# Patient Record
Sex: FEMALE | Race: WHITE | NOT HISPANIC OR LATINO | Employment: UNEMPLOYED | URBAN - NONMETROPOLITAN AREA
[De-identification: names, ages, dates, MRNs, and addresses within clinical notes are randomized per-mention and may not be internally consistent; named-entity substitution may affect disease eponyms.]

---

## 2017-09-20 ENCOUNTER — OFFICE VISIT (OUTPATIENT)
Dept: SURGERY | Facility: CLINIC | Age: 47
End: 2017-09-20

## 2017-09-20 VITALS
DIASTOLIC BLOOD PRESSURE: 82 MMHG | WEIGHT: 209.6 LBS | TEMPERATURE: 98 F | HEIGHT: 64 IN | HEART RATE: 62 BPM | BODY MASS INDEX: 35.78 KG/M2 | OXYGEN SATURATION: 98 % | SYSTOLIC BLOOD PRESSURE: 116 MMHG

## 2017-09-20 DIAGNOSIS — K21.9 GASTROESOPHAGEAL REFLUX DISEASE, ESOPHAGITIS PRESENCE NOT SPECIFIED: ICD-10-CM

## 2017-09-20 DIAGNOSIS — Z85.038 ENCOUNTER FOR COLONOSCOPY DUE TO HISTORY OF COLON CANCER: Primary | ICD-10-CM

## 2017-09-20 DIAGNOSIS — Z12.11 ENCOUNTER FOR COLONOSCOPY DUE TO HISTORY OF COLON CANCER: Primary | ICD-10-CM

## 2017-09-20 PROCEDURE — 99213 OFFICE O/P EST LOW 20 MIN: CPT | Performed by: SURGERY

## 2017-09-20 RX ORDER — SODIUM CHLORIDE 9 MG/ML
50 INJECTION, SOLUTION INTRAVENOUS CONTINUOUS
Status: CANCELLED | OUTPATIENT
Start: 2017-09-20

## 2017-09-20 RX ORDER — PRAVASTATIN SODIUM 40 MG
40 TABLET ORAL DAILY
COMMUNITY
Start: 2017-09-11

## 2017-09-20 RX ORDER — TIZANIDINE 2 MG/1
2 TABLET ORAL EVERY 6 HOURS PRN
COMMUNITY
Start: 2017-09-19

## 2017-09-20 RX ORDER — PANTOPRAZOLE SODIUM 40 MG/1
TABLET, DELAYED RELEASE ORAL
COMMUNITY
Start: 2017-09-19 | End: 2017-12-20 | Stop reason: SDUPTHER

## 2017-09-20 RX ORDER — BISACODYL 5 MG/1
TABLET, DELAYED RELEASE ORAL
Qty: 4 TABLET | Refills: 0 | Status: SHIPPED | OUTPATIENT
Start: 2017-09-20 | End: 2017-10-02 | Stop reason: HOSPADM

## 2017-09-20 RX ORDER — CETIRIZINE HYDROCHLORIDE 10 MG/1
10 TABLET ORAL DAILY
COMMUNITY
Start: 2017-09-19

## 2017-09-20 RX ORDER — PRENATAL WITH FERROUS FUM AND FOLIC ACID 3080; 920; 120; 400; 22; 1.84; 3; 20; 10; 1; 12; 200; 27; 25; 2 [IU]/1; [IU]/1; MG/1; [IU]/1; MG/1; MG/1; MG/1; MG/1; MG/1; MG/1; UG/1; MG/1; MG/1; MG/1; MG/1
1 TABLET ORAL DAILY
COMMUNITY
Start: 2017-09-19

## 2017-09-20 RX ORDER — LEVOTHYROXINE SODIUM 0.07 MG/1
75 TABLET ORAL DAILY
COMMUNITY
Start: 2017-09-19

## 2017-09-20 RX ORDER — SODIUM CHLORIDE 0.9 % (FLUSH) 0.9 %
1-10 SYRINGE (ML) INJECTION AS NEEDED
Status: CANCELLED | OUTPATIENT
Start: 2017-09-20

## 2017-09-20 RX ORDER — ACETAMINOPHEN 650 MG/1
650 TABLET, FILM COATED, EXTENDED RELEASE ORAL EVERY 8 HOURS PRN
COMMUNITY
Start: 2017-09-19

## 2017-09-20 RX ORDER — POLYETHYLENE GLYCOL 3350 17 G/17G
238 POWDER, FOR SOLUTION ORAL ONCE
Qty: 238 G | Refills: 0 | Status: SHIPPED | OUTPATIENT
Start: 2017-09-20 | End: 2017-09-20

## 2017-09-20 RX ORDER — GABAPENTIN 800 MG/1
TABLET ORAL
COMMUNITY
Start: 2017-09-19 | End: 2018-12-07 | Stop reason: HOSPADM

## 2017-09-20 RX ORDER — FLUOXETINE HYDROCHLORIDE 20 MG/1
20 CAPSULE ORAL DAILY
COMMUNITY
Start: 2017-09-19

## 2017-09-20 RX ORDER — LISINOPRIL 20 MG/1
40 TABLET ORAL DAILY
COMMUNITY
Start: 2017-09-13

## 2017-09-20 RX ORDER — CARVEDILOL 6.25 MG/1
6.25 TABLET ORAL 2 TIMES DAILY WITH MEALS
COMMUNITY
Start: 2017-09-13

## 2017-10-02 ENCOUNTER — ANESTHESIA EVENT (OUTPATIENT)
Dept: GASTROENTEROLOGY | Facility: HOSPITAL | Age: 47
End: 2017-10-02

## 2017-10-02 ENCOUNTER — HOSPITAL ENCOUNTER (OUTPATIENT)
Facility: HOSPITAL | Age: 47
Setting detail: HOSPITAL OUTPATIENT SURGERY
Discharge: HOME | End: 2017-10-02
Attending: SURGERY | Admitting: SURGERY

## 2017-10-02 ENCOUNTER — ANESTHESIA (OUTPATIENT)
Dept: GASTROENTEROLOGY | Facility: HOSPITAL | Age: 47
End: 2017-10-02

## 2017-10-02 VITALS
HEART RATE: 70 BPM | HEIGHT: 64 IN | BODY MASS INDEX: 35 KG/M2 | RESPIRATION RATE: 18 BRPM | DIASTOLIC BLOOD PRESSURE: 88 MMHG | WEIGHT: 205 LBS | TEMPERATURE: 97.6 F | SYSTOLIC BLOOD PRESSURE: 155 MMHG | OXYGEN SATURATION: 98 %

## 2017-10-02 DIAGNOSIS — Z12.11 ENCOUNTER FOR COLONOSCOPY DUE TO HISTORY OF COLON CANCER: ICD-10-CM

## 2017-10-02 DIAGNOSIS — Z85.038 ENCOUNTER FOR COLONOSCOPY DUE TO HISTORY OF COLON CANCER: ICD-10-CM

## 2017-10-02 DIAGNOSIS — K21.9 GASTROESOPHAGEAL REFLUX DISEASE, ESOPHAGITIS PRESENCE NOT SPECIFIED: ICD-10-CM

## 2017-10-02 LAB — GLUCOSE BLDC GLUCOMTR-MCNC: 100 MG/DL (ref 70–130)

## 2017-10-02 PROCEDURE — 43239 EGD BIOPSY SINGLE/MULTIPLE: CPT | Performed by: SURGERY

## 2017-10-02 PROCEDURE — 25010000002 MIDAZOLAM PER 1 MG: Performed by: NURSE ANESTHETIST, CERTIFIED REGISTERED

## 2017-10-02 PROCEDURE — 25010000002 PROPOFOL 200 MG/20ML EMULSION: Performed by: NURSE ANESTHETIST, CERTIFIED REGISTERED

## 2017-10-02 PROCEDURE — G0105 COLORECTAL SCRN; HI RISK IND: HCPCS | Performed by: SURGERY

## 2017-10-02 PROCEDURE — 82962 GLUCOSE BLOOD TEST: CPT

## 2017-10-02 RX ORDER — ALBUTEROL SULFATE 90 UG/1
2 AEROSOL, METERED RESPIRATORY (INHALATION) EVERY 4 HOURS PRN
COMMUNITY
End: 2018-12-07 | Stop reason: HOSPADM

## 2017-10-02 RX ORDER — SODIUM CHLORIDE 9 MG/ML
50 INJECTION, SOLUTION INTRAVENOUS CONTINUOUS
Status: DISCONTINUED | OUTPATIENT
Start: 2017-10-02 | End: 2017-10-02 | Stop reason: HOSPADM

## 2017-10-02 RX ORDER — SODIUM CHLORIDE 0.9 % (FLUSH) 0.9 %
1-10 SYRINGE (ML) INJECTION AS NEEDED
Status: DISCONTINUED | OUTPATIENT
Start: 2017-10-02 | End: 2017-10-02 | Stop reason: HOSPADM

## 2017-10-02 RX ORDER — MIDAZOLAM HYDROCHLORIDE 1 MG/ML
INJECTION INTRAMUSCULAR; INTRAVENOUS AS NEEDED
Status: DISCONTINUED | OUTPATIENT
Start: 2017-10-02 | End: 2017-10-02 | Stop reason: SURG

## 2017-10-02 RX ORDER — PROPOFOL 10 MG/ML
INJECTION, EMULSION INTRAVENOUS AS NEEDED
Status: DISCONTINUED | OUTPATIENT
Start: 2017-10-02 | End: 2017-10-02 | Stop reason: SURG

## 2017-10-02 RX ADMIN — SODIUM CHLORIDE 50 ML/HR: 9 INJECTION, SOLUTION INTRAVENOUS at 10:35

## 2017-10-02 RX ADMIN — MIDAZOLAM HYDROCHLORIDE 2 MG: 1 INJECTION, SOLUTION INTRAMUSCULAR; INTRAVENOUS at 11:15

## 2017-10-02 RX ADMIN — PROPOFOL 50 MG: 10 INJECTION, EMULSION INTRAVENOUS at 11:29

## 2017-10-02 RX ADMIN — LIDOCAINE HYDROCHLORIDE 60 MG: 20 INJECTION, SOLUTION INTRAVENOUS at 11:18

## 2017-10-02 RX ADMIN — PROPOFOL 100 MG: 10 INJECTION, EMULSION INTRAVENOUS at 11:18

## 2017-10-02 RX ADMIN — Medication 20 MG: at 11:18

## 2017-10-02 RX ADMIN — PROPOFOL 50 MG: 10 INJECTION, EMULSION INTRAVENOUS at 11:24

## 2017-10-02 NOTE — PLAN OF CARE
Problem: GI Endoscopy (Adult)  Goal: Signs and Symptoms of Listed Potential Problems Will be Absent or Manageable (GI Endoscopy)  Outcome: Outcome(s) achieved Date Met:  10/02/17    10/02/17 1204   GI Endoscopy   Problems Assessed (GI Endoscopy) all   Problems Present (GI Endoscopy) none

## 2017-10-02 NOTE — ANESTHESIA PREPROCEDURE EVALUATION
Anesthesia Evaluation     Patient summary reviewed and Nursing notes reviewed   no history of anesthetic complications:  NPO Solid Status: > 8 hours  NPO Liquid Status: > 8 hours     Airway   Mallampati: II  Neck ROM: full  no difficulty expected  Dental - normal exam     Pulmonary - normal exam    breath sounds clear to auscultation  (+) COPD mild, sleep apnea,   Cardiovascular - normal exam  Exercise tolerance: good (4-7 METS)    ECG reviewed  Patient on routine beta blocker and Beta blocker given within 24 hours of surgery  Rhythm: regular  Rate: normal    (+) hypertension well controlled, CHF,       Neuro/Psych  (+) headaches, psychiatric history Depression,    GI/Hepatic/Renal/Endo    (+) obesity,  GERD poorly controlled, diabetes mellitus type 2 well controlled, hypothyroidism,     Musculoskeletal     Abdominal   (+) obese,    Substance History - negative use     OB/GYN negative ob/gyn ROS         Other   (+) arthritis   history of cancer remission    ROS/Med Hx Other: Hx colon CA                                  Anesthesia Plan    ASA 3     MAC     Anesthetic plan and risks discussed with patient.

## 2017-10-02 NOTE — H&P (VIEW-ONLY)
Subjective   Rehana Hamilton is a 47 y.o. female.   Chief Complaint   Patient presents with   • Follow-up     Follow up Colon            History of Present Illness   Ms. Hamilton returns to the office to discuss surveillance colonoscopy.  She has a personal history of colon cancer diagnosed in 2014 which was subsequently treated with transverse colectomy.  Her las colonoscopy was in 7/2016 and was performed during an evaluation for GI bleeding.  She is due for routine high risk surveillance.       Patient Active Problem List   Diagnosis   • Allergic rhinitis   • GERD (gastroesophageal reflux disease)   • Hypertension   • Migraine   • Sleep apnea   • Rheumatoid arthritis   • Type 2 diabetes mellitus   • Congestive heart failure   • Chronic obstructive lung disease   • Colon cancer   • Hypothyroidism   • Encounter for colonoscopy due to history of colon cancer   • Gastroesophageal reflux disease       Past Medical History:   Diagnosis Date   • Allergic rhinitis    • Cancer of transverse colon     Personal history of colon cancer   • Colon cancer 10/11/2016   • Congestive heart failure    • Depression    • Esophagitis    • GERD (gastroesophageal reflux disease)    • History of chronic obstructive lung disease    • History of degenerative disc disease    • History of migraine    • History of sleep apnea    • Hypertension    • Personal history of congestive heart failure    • Rheumatoid arthritis    • Type 2 diabetes mellitus        Past Surgical History:   Procedure Laterality Date   • CHOLECYSTECTOMY     • COLON RESECTION  05/01/2014   • COLONOSCOPY W/ BIOPSIES     • COLONOSCOPY W/ POLYPECTOMY     • DILATION AND CURETTAGE, DIAGNOSTIC / THERAPEUTIC     • HYSTERECTOMY      Total Abdominal Hysterectomy with Removal of Both Ovaries   • TUBAL ABDOMINAL LIGATION     • UPPER GASTROINTESTINAL ENDOSCOPY      Diagnostic       Meds: Reviewed and updated as appropriate.        Allergies   Allergen Reactions   • Ciprofloxacin  "Hives         Family History   Problem Relation Age of Onset   • Prostate cancer Father    • Osteoporosis Mother    • Diabetes Sister          Social History     Social History   • Marital status:      Spouse name: N/A   • Number of children: N/A   • Years of education: N/A     Occupational History   • Not on file.     Social History Main Topics   • Smoking status: Never Smoker   • Smokeless tobacco: Never Used   • Alcohol use No   • Drug use: No   • Sexual activity: Defer     Other Topics Concern   • Not on file     Social History Narrative         Review of Systems   Constitutional: Negative for chills, fever and unexpected weight change.   HENT: Negative for hearing loss, trouble swallowing and voice change.    Eyes: Negative for visual disturbance.   Respiratory: Negative for apnea, cough, chest tightness, shortness of breath and wheezing.    Cardiovascular: Negative for chest pain, palpitations and leg swelling.   Gastrointestinal: Positive for diarrhea and nausea. Negative for abdominal distention, abdominal pain, anal bleeding, blood in stool, constipation, rectal pain and vomiting.   Endocrine: Negative for cold intolerance and heat intolerance.   Genitourinary: Negative for difficulty urinating, dysuria and flank pain.   Musculoskeletal: Negative for back pain and gait problem.   Skin: Negative for color change, rash and wound.   Neurological: Negative for dizziness, syncope, speech difficulty, weakness, light-headedness, numbness and headaches.   Hematological: Negative for adenopathy. Does not bruise/bleed easily.   Psychiatric/Behavioral: Negative for confusion. The patient is not nervous/anxious.        Objective    /82  Pulse 62  Temp 98 °F (36.7 °C) (Temporal Artery )   Ht 64\" (162.6 cm)  Wt 209 lb 9.6 oz (95.1 kg)  SpO2 98%  BMI 35.98 kg/m2    Physical Exam   Constitutional: She is oriented to person, place, and time. She appears well-developed and well-nourished.   HENT:   Head: " Normocephalic and atraumatic.   Eyes: No scleral icterus.   Cardiovascular: Regular rhythm.    Abdominal: Soft. She exhibits no distension. There is no tenderness.   Neurological: She is alert and oriented to person, place, and time.   Skin: Skin is warm and dry.   Psychiatric: She has a normal mood and affect. Her behavior is normal.       Assessment/Plan   Rehana was seen today for follow-up.    Diagnoses and all orders for this visit:    Encounter for colonoscopy due to history of colon cancer  -     Case Request; Standing  -     sodium chloride 0.9 % infusion; Infuse 50 mL/hr into a venous catheter Continuous.  -     sodium chloride 0.9 % flush 1-10 mL; Infuse 1-10 mL into a venous catheter As Needed for Line Care.  -     Case Request    Gastroesophageal reflux disease, esophagitis presence not specified  -     Case Request; Standing  -     sodium chloride 0.9 % infusion; Infuse 50 mL/hr into a venous catheter Continuous.  -     sodium chloride 0.9 % flush 1-10 mL; Infuse 1-10 mL into a venous catheter As Needed for Line Care.  -     Case Request    Other orders  -     Follow Anesthesia Guidelines / Standing Orders; Future  -     Provide NPO Instructions to Patient  -     Follow Anesthesia Guidelines / Standing Orders; Standing  -     Verify NPO Status; Standing  -     Notify Physician (Specify Parameters); Standing  -     Insert Peripheral IV; Standing  -     Saline Lock & Maintain IV Access; Standing  -     polyethylene glycol (MIRALAX) powder; Take 238 g by mouth 1 (One) Time for 1 dose. Mix 238 grams of miralax with 64 oz garotade and drink at 4 pm day before colonoscopy  -     bisacodyl (DULCOLAX) 5 MG EC tablet; Take 4 tablets once by mouth at 1 pm on day before colonoscopy.        We discussed colonoscopy for colon cancer surveillance purposes.  We discussed the indications for colonoscopy as well as the risks, benefits and alternatives to this procedure. Risks including but not limited to perforation,  bleeding,need for blood transfusion or emergent surgery ,and missed neoplasm were reviewed in detail with the patient. The necessary bowel preparation and pre-procedure clear liquid diet was explained in detail.  A written instructional handout was also provided.  Electronic prescriptions for miralax and dulcolax were sent to the patient's pharmacy.  The patient was given an opportunity to ask questions.  The patient verbalized understanding of these recommendations and the plan of care. The patient is willing to proceed with colonoscopy and has signed informed consent in the office today.  My office will arrange scheduling for the colonoscopy procedure and pre-admission testing.

## 2017-10-02 NOTE — ANESTHESIA POSTPROCEDURE EVALUATION
Patient: Rehana Hamilton    Procedure Summary     Date Anesthesia Start Anesthesia Stop Room / Location    10/02/17 1112 1152 [facility] ENDOSCOPY 3 / [facility]       Procedure Diagnosis Surgeon Provider    DIAGNOSTIC COLONOSCOPY (N/A ); ESOPHAGOGASTRODUODENOSCOPY WITH BIOPSY (N/A Esophagus) Encounter for colonoscopy due to history of colon cancer; Gastroesophageal reflux disease, esophagitis presence not specified  (Encounter for colonoscopy due to history of colon cancer [Z12.11, Z85.038]; Gastroesophageal reflux disease, esophagitis presence not specified [K21.9]) MD Mallory Wiley CRNA          Anesthesia Type: MAC  Last vitals  BP   155/88 (10/02/17 1224)    Temp   97.6 °F (36.4 °C) (10/02/17 1154)    Pulse   70 (10/02/17 1224)   Resp   18 (10/02/17 1224)    SpO2   98 % (10/02/17 1224)      Post Anesthesia Care and Evaluation    Patient location during evaluation: PHASE II  Patient participation: complete - patient participated  Level of consciousness: awake and alert  Pain score: 0  Pain management: satisfactory to patient  Airway patency: patent  Anesthetic complications: No anesthetic complications  PONV Status: none  Cardiovascular status: acceptable and stable  Respiratory status: acceptable  Hydration status: acceptable

## 2017-10-02 NOTE — PLAN OF CARE
Problem: GI Endoscopy (Adult)  Goal: Signs and Symptoms of Listed Potential Problems Will be Absent or Manageable (GI Endoscopy)  Outcome: Ongoing (interventions implemented as appropriate)    10/02/17 0959   GI Endoscopy   Problems Assessed (GI Endoscopy) all   Problems Present (GI Endoscopy) none

## 2017-10-02 NOTE — BRIEF OP NOTE
COLONOSCOPY, ESOPHAGOGASTRODUODENOSCOPY WITH BIOPSY  Progress Note    Rehana Hamilton  10/2/2017    Pre-op Diagnosis:   Encounter for colonoscopy due to history of colon cancer [Z12.11, Z85.038]  Gastroesophageal reflux disease, esophagitis presence not specified [K21.9]    Post-op Diagnosis:     Gastritis, distal esophagitis with noted salmon colored mucosa, small hiatal hernia.   Normal colon, internal hemorrhoids.     Procedure/CPT® Codes: 96748, 34055      Procedure(s):  DIAGNOSTIC COLONOSCOPY  ESOPHAGOGASTRODUODENOSCOPY WITH BIOPSY    Surgeon(s):  Elina Summers MD    Anesthesia: Monitor Anesthesia Care    Staff:   Circulator: Nupur Smith RN; Jenelle Sanders  Scrub Person: Kinza Gore    Estimated Blood Loss: 0 mL    Urine Voided: * No values recorded between 10/2/2017 11:06 AM and 10/2/2017 11:49 AM *    Specimens:                  ID Type Source Tests Collected by Time Destination   A : biopsy Tissue Gastric, Antrum TISSUE EXAM Nupur Smith RN 10/2/2017 1120    B : biopsy duodenum Tissue Small Intestine, Duodenum TISSUE EXAM Elina Summers MD 10/2/2017 1123    C : Esophagus biopsy Tissue Esophagus TISSUE EXAM Nupur Smith RN 10/2/2017 1124              Findings: see above    Complications: none      Elina Summers MD     Date: 10/2/2017  Time: 11:49 AM

## 2017-10-05 LAB
LAB AP CASE REPORT: NORMAL
Lab: NORMAL
PATH REPORT.FINAL DX SPEC: NORMAL

## 2017-11-01 ENCOUNTER — TELEPHONE (OUTPATIENT)
Dept: SURGERY | Facility: CLINIC | Age: 47
End: 2017-11-01

## 2017-11-01 NOTE — TELEPHONE ENCOUNTER
Called patient to reschedule her no show appointment, patient answered and then hung up. Will try again.

## 2017-11-08 ENCOUNTER — OFFICE VISIT (OUTPATIENT)
Dept: SURGERY | Facility: CLINIC | Age: 47
End: 2017-11-08

## 2017-11-08 VITALS
BODY MASS INDEX: 35.61 KG/M2 | HEIGHT: 64 IN | TEMPERATURE: 97.8 F | DIASTOLIC BLOOD PRESSURE: 80 MMHG | HEART RATE: 68 BPM | OXYGEN SATURATION: 98 % | SYSTOLIC BLOOD PRESSURE: 140 MMHG | WEIGHT: 208.6 LBS

## 2017-11-08 DIAGNOSIS — C18.4 MALIGNANT NEOPLASM OF TRANSVERSE COLON (HCC): ICD-10-CM

## 2017-11-08 DIAGNOSIS — K21.00 GASTROESOPHAGEAL REFLUX DISEASE WITH ESOPHAGITIS: Primary | ICD-10-CM

## 2017-11-08 DIAGNOSIS — Z12.11 ENCOUNTER FOR COLONOSCOPY DUE TO HISTORY OF COLON CANCER: ICD-10-CM

## 2017-11-08 DIAGNOSIS — Z85.038 ENCOUNTER FOR COLONOSCOPY DUE TO HISTORY OF COLON CANCER: ICD-10-CM

## 2017-11-08 PROCEDURE — 99212 OFFICE O/P EST SF 10 MIN: CPT | Performed by: SURGERY

## 2017-11-08 RX ORDER — RANITIDINE 150 MG/1
150 TABLET ORAL NIGHTLY
Qty: 30 TABLET | Refills: 3 | Status: SHIPPED | OUTPATIENT
Start: 2017-11-08 | End: 2018-02-06

## 2017-12-01 NOTE — PROGRESS NOTES
Patient: Rehana Hamilton    YOB: 1970    Date: 11/08/2017    Primary Care Provider: Dana Dubois MD    Reason for Consultation: Follow-up EGD    Chief Complaint:   Chief Complaint   Patient presents with   • Follow-up     Patient is here for a follow up on Colonoscopy and EGD       History of present illness:  Ms. Hamilton presents for follow up her recent colonoscopy and EGD. Pathology shows minimal chronic gastritis, benign duodenal mucosa with no specific pathologic diagnosis.  Her colonoscopy was normal.  Patient states that she has some abdominal soreness at times.  Patient states that she has the acid that comes and goes.  Patient states that she has some nausea at times.    The following portions of the patient's history were reviewed and updated as appropriate: allergies, current medications, past family history, past medical history, past social history, past surgical history and problem list.      Review of Systems   Constitutional: Negative for chills, fever and unexpected weight change.   HENT: Positive for trouble swallowing. Negative for hearing loss and voice change.    Eyes: Negative for visual disturbance.   Respiratory: Negative for apnea, cough, chest tightness, shortness of breath and wheezing.    Cardiovascular: Negative for chest pain, palpitations and leg swelling.   Gastrointestinal: Positive for abdominal pain and nausea. Negative for abdominal distention, anal bleeding, blood in stool, constipation, diarrhea, rectal pain and vomiting.   Endocrine: Negative for cold intolerance and heat intolerance.   Genitourinary: Negative for difficulty urinating, dysuria and flank pain.   Musculoskeletal: Negative for back pain and gait problem.   Skin: Negative for color change, rash and wound.   Neurological: Negative for dizziness, syncope, speech difficulty, weakness, light-headedness, numbness and headaches.   Hematological: Negative for adenopathy. Does not  bruise/bleed easily.   Psychiatric/Behavioral: Negative for confusion. The patient is not nervous/anxious.        Vital Signs:       Vitals:    11/08/17 1052   BP: 140/80   Pulse: 68   Temp: 97.8 °F (36.6 °C)   SpO2: 98%         Allergies:  Allergies   Allergen Reactions   • Ciprofloxacin Hives       Medications:    Current Outpatient Prescriptions:   •  albuterol (PROVENTIL HFA;VENTOLIN HFA) 108 (90 Base) MCG/ACT inhaler, Inhale 2 puffs Every 4 (Four) Hours As Needed for Wheezing., Disp: , Rfl:   •  carvedilol (COREG) 6.25 MG tablet, , Disp: , Rfl:   •  cetirizine (zyrTEC) 10 MG tablet, , Disp: , Rfl:   •  FLUoxetine (PROzac) 20 MG capsule, , Disp: , Rfl:   •  gabapentin (NEURONTIN) 800 MG tablet, , Disp: , Rfl:   •  levothyroxine (SYNTHROID, LEVOTHROID) 75 MCG tablet, , Disp: , Rfl:   •  lisinopril (PRINIVIL,ZESTRIL) 20 MG tablet, , Disp: , Rfl:   •  metFORMIN (GLUCOPHAGE) 500 MG tablet, , Disp: , Rfl:   •  pantoprazole (PROTONIX) 40 MG EC tablet, , Disp: , Rfl:   •  pravastatin (PRAVACHOL) 40 MG tablet, , Disp: , Rfl:   •  Prenatal Vit-Fe Fumarate-FA (PRENATAL 27-1) 27-1 MG tablet tablet, , Disp: , Rfl:   •  SM 8 HOUR PAIN RELIEF 650 MG 8 hr tablet, , Disp: , Rfl:   •  tiZANidine (ZANAFLEX) 2 MG tablet, , Disp: , Rfl:   •  raNITIdine (ZANTAC) 150 MG tablet, Take 1 tablet by mouth Every Night for 90 days., Disp: 30 tablet, Rfl: 3    Physical Exam:   General Appearance:    Alert, cooperative, in no acute distress   Abdomen:     no masses, no organomegaly, soft non-tender, non-distended, no guarding, wounds are well healed, no evidence of recurrent hernia   Chest:      Clear toausculation            Cor:  Regular rate and rhythm      Assessment / Plan:    1. Gastroesophageal reflux disease with esophagitis    2. Malignant neoplasm of transverse colon    3. Encounter for colonoscopy due to history of colon cancer        I did discuss theResults as above with the patient today in the office and they have done well  from their recent EGD with biopsy and surveillance colonoscopy.  I have added Zantac to the patient's regimen for improved control of her gastroesophageal reflux.  Regarding her history of colon cancer and her colon screening, she will need a repeat exam in one year.  I have told the patient I will follow up in one month or reevaluation of her gastroesophageal reflux disease..      Scribed for Elina Summers MD by Brittany Bryant.

## 2017-12-14 ENCOUNTER — TELEPHONE (OUTPATIENT)
Dept: SURGERY | Facility: CLINIC | Age: 47
End: 2017-12-14

## 2017-12-14 NOTE — TELEPHONE ENCOUNTER
Called and spoke with patient about no showing for her appointment with Dr Summers. Rescheduled her appointment for 12/20/17.

## 2017-12-20 ENCOUNTER — OFFICE VISIT (OUTPATIENT)
Dept: SURGERY | Facility: CLINIC | Age: 47
End: 2017-12-20

## 2017-12-20 VITALS
HEIGHT: 64 IN | SYSTOLIC BLOOD PRESSURE: 130 MMHG | TEMPERATURE: 97.7 F | OXYGEN SATURATION: 98 % | WEIGHT: 212 LBS | BODY MASS INDEX: 36.19 KG/M2 | HEART RATE: 59 BPM | DIASTOLIC BLOOD PRESSURE: 80 MMHG

## 2017-12-20 DIAGNOSIS — K21.00 GASTROESOPHAGEAL REFLUX DISEASE WITH ESOPHAGITIS: Primary | ICD-10-CM

## 2017-12-20 PROCEDURE — 99213 OFFICE O/P EST LOW 20 MIN: CPT | Performed by: SURGERY

## 2017-12-20 RX ORDER — PANTOPRAZOLE SODIUM 40 MG/1
40 TABLET, DELAYED RELEASE ORAL DAILY
Qty: 30 TABLET | Refills: 10 | Status: SHIPPED | OUTPATIENT
Start: 2017-12-20 | End: 2019-01-10 | Stop reason: SDUPTHER

## 2017-12-20 RX ORDER — SIMETHICONE 80 MG
80 TABLET,CHEWABLE ORAL EVERY 6 HOURS PRN
Qty: 100 TABLET | Refills: 0 | Status: SHIPPED | OUTPATIENT
Start: 2017-12-20 | End: 2018-01-19

## 2018-01-19 ENCOUNTER — HOSPITAL ENCOUNTER (OUTPATIENT)
Dept: NUTRITION | Facility: HOSPITAL | Age: 48
Discharge: HOME | End: 2018-01-19

## 2018-05-24 ENCOUNTER — APPOINTMENT (OUTPATIENT)
Dept: NUTRITION | Facility: HOSPITAL | Age: 48
End: 2018-05-24

## 2018-06-14 ENCOUNTER — APPOINTMENT (OUTPATIENT)
Dept: DIABETES SERVICES | Facility: HOSPITAL | Age: 48
End: 2018-06-14

## 2018-10-05 ENCOUNTER — TELEPHONE (OUTPATIENT)
Dept: SURGERY | Facility: CLINIC | Age: 48
End: 2018-10-05

## 2018-10-31 ENCOUNTER — TELEPHONE (OUTPATIENT)
Dept: SURGERY | Facility: CLINIC | Age: 48
End: 2018-10-31

## 2018-10-31 RX ORDER — POLYETHYLENE GLYCOL 3350 17 G/17G
238 POWDER, FOR SOLUTION ORAL ONCE
Qty: 14 PACKET | Refills: 0 | Status: SHIPPED | OUTPATIENT
Start: 2018-10-31 | End: 2018-10-31

## 2018-10-31 RX ORDER — BISACODYL 5 MG/1
5 TABLET, DELAYED RELEASE ORAL DAILY PRN
Qty: 4 TABLET | Refills: 0 | Status: SHIPPED | OUTPATIENT
Start: 2018-10-31 | End: 2018-12-07 | Stop reason: HOSPADM

## 2018-10-31 NOTE — TELEPHONE ENCOUNTER
Per Dr. Summers I called the patient and scheduled her open access. She is scheduled for a colonoscopy on 12/07/18.

## 2018-11-14 ENCOUNTER — PREP FOR SURGERY (OUTPATIENT)
Dept: OTHER | Facility: HOSPITAL | Age: 48
End: 2018-11-14

## 2018-11-14 DIAGNOSIS — Z85.038 PERSONAL HISTORY OF COLON CANCER: Primary | ICD-10-CM

## 2018-11-14 RX ORDER — SODIUM CHLORIDE 0.9 % (FLUSH) 0.9 %
3-10 SYRINGE (ML) INJECTION AS NEEDED
Status: CANCELLED | OUTPATIENT
Start: 2018-11-14

## 2018-11-14 RX ORDER — SODIUM CHLORIDE, SODIUM LACTATE, POTASSIUM CHLORIDE, CALCIUM CHLORIDE 600; 310; 30; 20 MG/100ML; MG/100ML; MG/100ML; MG/100ML
50 INJECTION, SOLUTION INTRAVENOUS CONTINUOUS
Status: CANCELLED | OUTPATIENT
Start: 2018-11-14

## 2018-11-14 RX ORDER — SODIUM CHLORIDE 0.9 % (FLUSH) 0.9 %
3 SYRINGE (ML) INJECTION EVERY 12 HOURS SCHEDULED
Status: CANCELLED | OUTPATIENT
Start: 2018-11-14

## 2018-12-07 ENCOUNTER — ANESTHESIA EVENT (OUTPATIENT)
Dept: GASTROENTEROLOGY | Facility: HOSPITAL | Age: 48
End: 2018-12-07

## 2018-12-07 ENCOUNTER — HOSPITAL ENCOUNTER (OUTPATIENT)
Facility: HOSPITAL | Age: 48
Setting detail: HOSPITAL OUTPATIENT SURGERY
Discharge: HOME | End: 2018-12-07
Attending: SURGERY | Admitting: SURGERY

## 2018-12-07 ENCOUNTER — ANESTHESIA (OUTPATIENT)
Dept: GASTROENTEROLOGY | Facility: HOSPITAL | Age: 48
End: 2018-12-07

## 2018-12-07 VITALS
SYSTOLIC BLOOD PRESSURE: 152 MMHG | RESPIRATION RATE: 20 BRPM | OXYGEN SATURATION: 99 % | TEMPERATURE: 98.1 F | BODY MASS INDEX: 39.09 KG/M2 | HEART RATE: 62 BPM | DIASTOLIC BLOOD PRESSURE: 77 MMHG | WEIGHT: 229 LBS | HEIGHT: 64 IN

## 2018-12-07 DIAGNOSIS — Z85.038 PERSONAL HISTORY OF COLON CANCER: ICD-10-CM

## 2018-12-07 LAB — GLUCOSE BLDC GLUCOMTR-MCNC: 102 MG/DL (ref 70–130)

## 2018-12-07 PROCEDURE — S0260 H&P FOR SURGERY: HCPCS | Performed by: SURGERY

## 2018-12-07 PROCEDURE — 25010000002 PROPOFOL 200 MG/20ML EMULSION: Performed by: NURSE ANESTHETIST, CERTIFIED REGISTERED

## 2018-12-07 PROCEDURE — 82962 GLUCOSE BLOOD TEST: CPT

## 2018-12-07 PROCEDURE — 25010000002 MIDAZOLAM PER 1 MG: Performed by: NURSE ANESTHETIST, CERTIFIED REGISTERED

## 2018-12-07 PROCEDURE — G0105 COLORECTAL SCRN; HI RISK IND: HCPCS | Performed by: SURGERY

## 2018-12-07 RX ORDER — SODIUM CHLORIDE, SODIUM LACTATE, POTASSIUM CHLORIDE, CALCIUM CHLORIDE 600; 310; 30; 20 MG/100ML; MG/100ML; MG/100ML; MG/100ML
50 INJECTION, SOLUTION INTRAVENOUS CONTINUOUS
Status: DISCONTINUED | OUTPATIENT
Start: 2018-12-07 | End: 2018-12-07 | Stop reason: HOSPADM

## 2018-12-07 RX ORDER — LABETALOL HYDROCHLORIDE 5 MG/ML
INJECTION, SOLUTION INTRAVENOUS AS NEEDED
Status: DISCONTINUED | OUTPATIENT
Start: 2018-12-07 | End: 2018-12-07 | Stop reason: SURG

## 2018-12-07 RX ORDER — MAGNESIUM HYDROXIDE 1200 MG/15ML
LIQUID ORAL AS NEEDED
Status: DISCONTINUED | OUTPATIENT
Start: 2018-12-07 | End: 2018-12-07 | Stop reason: HOSPADM

## 2018-12-07 RX ORDER — MEPERIDINE HYDROCHLORIDE 50 MG/ML
INJECTION INTRAMUSCULAR; INTRAVENOUS; SUBCUTANEOUS AS NEEDED
Status: DISCONTINUED | OUTPATIENT
Start: 2018-12-07 | End: 2018-12-07 | Stop reason: SURG

## 2018-12-07 RX ORDER — ASPIRIN 81 MG/1
81 TABLET, CHEWABLE ORAL DAILY
COMMUNITY
End: 2020-09-11 | Stop reason: HOSPADM

## 2018-12-07 RX ORDER — KETAMINE HYDROCHLORIDE 50 MG/ML
INJECTION, SOLUTION, CONCENTRATE INTRAMUSCULAR; INTRAVENOUS AS NEEDED
Status: DISCONTINUED | OUTPATIENT
Start: 2018-12-07 | End: 2018-12-07 | Stop reason: SURG

## 2018-12-07 RX ORDER — PROPOFOL 10 MG/ML
INJECTION, EMULSION INTRAVENOUS AS NEEDED
Status: DISCONTINUED | OUTPATIENT
Start: 2018-12-07 | End: 2018-12-07 | Stop reason: SURG

## 2018-12-07 RX ORDER — MIDAZOLAM HYDROCHLORIDE 1 MG/ML
INJECTION INTRAMUSCULAR; INTRAVENOUS AS NEEDED
Status: DISCONTINUED | OUTPATIENT
Start: 2018-12-07 | End: 2018-12-07 | Stop reason: SURG

## 2018-12-07 RX ADMIN — PROPOFOL 20 MG: 10 INJECTION, EMULSION INTRAVENOUS at 08:20

## 2018-12-07 RX ADMIN — PROPOFOL 20 MG: 10 INJECTION, EMULSION INTRAVENOUS at 08:16

## 2018-12-07 RX ADMIN — LABETALOL HYDROCHLORIDE 5 MG: 5 INJECTION, SOLUTION INTRAVENOUS at 08:08

## 2018-12-07 RX ADMIN — MIDAZOLAM HYDROCHLORIDE 2 MG: 1 INJECTION, SOLUTION INTRAMUSCULAR; INTRAVENOUS at 08:05

## 2018-12-07 RX ADMIN — MEPERIDINE HYDROCHLORIDE 50 MG: 50 INJECTION, SOLUTION INTRAMUSCULAR; INTRAVENOUS; SUBCUTANEOUS at 08:08

## 2018-12-07 RX ADMIN — PROPOFOL 20 MG: 10 INJECTION, EMULSION INTRAVENOUS at 08:14

## 2018-12-07 RX ADMIN — SODIUM CHLORIDE, POTASSIUM CHLORIDE, SODIUM LACTATE AND CALCIUM CHLORIDE 50 ML/HR: 600; 310; 30; 20 INJECTION, SOLUTION INTRAVENOUS at 07:07

## 2018-12-07 RX ADMIN — LABETALOL HYDROCHLORIDE 5 MG: 5 INJECTION, SOLUTION INTRAVENOUS at 08:05

## 2018-12-07 RX ADMIN — KETAMINE HYDROCHLORIDE 20 MG: 50 INJECTION, SOLUTION INTRAMUSCULAR; INTRAVENOUS at 08:09

## 2018-12-07 RX ADMIN — PROPOFOL 20 MG: 10 INJECTION, EMULSION INTRAVENOUS at 08:22

## 2018-12-07 RX ADMIN — PROPOFOL 20 MG: 10 INJECTION, EMULSION INTRAVENOUS at 08:18

## 2018-12-07 RX ADMIN — PROPOFOL 20 MG: 10 INJECTION, EMULSION INTRAVENOUS at 08:11

## 2018-12-07 RX ADMIN — PROPOFOL 20 MG: 10 INJECTION, EMULSION INTRAVENOUS at 08:24

## 2018-12-07 RX ADMIN — PROPOFOL 20 MG: 10 INJECTION, EMULSION INTRAVENOUS at 08:26

## 2018-12-07 NOTE — DISCHARGE INSTRUCTIONS
No pushing, pulling, tugging, heavy lifting, or strenuous activity.  No major decision making, driving, or drinking alcoholic beverages for 24 hours. (due to the medications you have received)  Always use good hand hygiene/washing techniques.  NO driving while taking pain medications.    To assist you in voiding:  Drink plenty of fluids  Listen to running water while attempting to void.    If you are unable to urinate and you have an uncomfortable urge to void or it has been   6 hours since you were discharged, return to the Emergency Room

## 2018-12-07 NOTE — ANESTHESIA PREPROCEDURE EVALUATION
Anesthesia Evaluation     Patient summary reviewed and Nursing notes reviewed   no history of anesthetic complications:  NPO Solid Status: > 8 hours  NPO Liquid Status: > 8 hours           Airway   Mallampati: II  Neck ROM: full  no difficulty expected  Dental - normal exam     Pulmonary - normal exam    breath sounds clear to auscultation  (+) COPD mild, shortness of breath, sleep apnea,   (-) not a smoker  Cardiovascular - normal exam  Exercise tolerance: good (4-7 METS)    ECG reviewed  Patient on routine beta blocker and Beta blocker given within 24 hours of surgery  Rhythm: regular  Rate: normal    (+) hypertension well controlled, CHF, SMITH,   CAD:  inc risk morb obese, julia, dm.      Neuro/Psych  (+) headaches, psychiatric history Depression,     GI/Hepatic/Renal/Endo    (+) obesity, morbid obesity, GERD poorly controlled,  diabetes mellitus type 2 well controlled, hypothyroidism,     Musculoskeletal     Abdominal   (+) obese,    Substance History - negative use     OB/GYN negative ob/gyn ROS         Other   (+) arthritis   history of cancer remission    ROS/Med Hx Other: Hx colon CA                  Anesthesia Plan    ASA 3     MAC   (Risks and benefits discussed including risk of aspiration, recall and dental damage. All patient questions answered. Will continue with POC.)  Anesthetic plan, all risks, benefits, and alternatives have been provided, discussed and informed consent has been obtained with: patient.    Plan discussed with CRNA.

## 2018-12-07 NOTE — ANESTHESIA POSTPROCEDURE EVALUATION
Patient: Rehana Hamilton    Procedure Summary     Date:  12/07/18 Room / Location:  [facility] ENDOSCOPY 3 / [facility] ENDOSCOPY    Anesthesia Start:  0803 Anesthesia Stop:      Procedure:  COLONOSCOPY (N/A ) Diagnosis:       Personal history of colon cancer      (Personal history of colon cancer [Z85.038])    Surgeon:  Elina Summers MD Provider:  Milton Carson CRNA    Anesthesia Type:  MAC ASA Status:  3          Anesthesia Type: MAC  Last vitals  BP   (!) 164/107 (12/07/18 0717)   Temp   97 °F (36.1 °C) (12/07/18 0642)   Pulse   75 (12/07/18 0642)   Resp   18 (12/07/18 0642)     SpO2   97 % (12/07/18 0642)     Post Anesthesia Care and Evaluation    Patient location during evaluation: PHASE II  Patient participation: complete - patient participated  Level of consciousness: awake  Pain score: 0  Pain management: adequate  Airway patency: patent  Anesthetic complications: No anesthetic complications  PONV Status: none  Cardiovascular status: acceptable  Respiratory status: acceptable and nasal cannula  Hydration status: acceptable    Comments: vsss resp spont, reflexes intact, responsive, report given to pacu nurse

## 2018-12-07 NOTE — H&P
"General Surgery H&P    Name:Rehana Hamilton  Age: 48 y.o.  Gender: female  : 1970  MRN: [id number]  Visit Number: [id number]  Admit Date: 2018  Date of Service: 18    Patient Care Team:  Kimberly Hughes MD as PCP - General (Internal Medicine)  Elina Summers MD as Surgeon (General Surgery)        Chief complaint : colon cancer surveillance      History of Present Illness    Rehana Hamilton is a 48 y.o. female patient who presents for colon cancer screening.  There is no family history of colon neoplasia. No family hx of gastric, ovarian, or uterine cancer. Patient denies changes in bowel habits, diarrhea, constipation, abdominal pain, decreased caliber of stool, melena, brbpr and weight loss. Patient reports personal history of colon cancer diagnosed in  s/p transverse colectomy.   There is no personal or family history of bleeding disorder or untoward reaction to anesthesia.  Patient has had a colonoscopy 1 year(s) ago.            Past Medical History:   Diagnosis Date   • Allergic rhinitis    • Cancer of transverse colon (CMS/HCC)     Personal history of colon cancer   • Colon cancer (CMS/HCC) 10/11/2016   • Congestive heart failure (CMS/HCC)    • COPD (chronic obstructive pulmonary disease) (CMS/HCC)    • Depression    • Disease of thyroid gland    • Esophagitis    • GERD (gastroesophageal reflux disease)    • History of degenerative disc disease    • History of migraine    • History of nuclear stress test     \"IT WAS NORMAL\"   • History of sleep apnea     NO CPAP   • Hypertension    • Personal history of congestive heart failure    • PONV (postoperative nausea and vomiting)    • Rheumatoid arthritis (CMS/HCC)    • Tattoo     X5   • Type 2 diabetes mellitus (CMS/HCC)        Past Surgical History:   Procedure Laterality Date   • CHOLECYSTECTOMY     • COLON RESECTION  2014    SECONDARY TO COLON CANCER   • COLONOSCOPY W/ BIOPSIES     • COLONOSCOPY W/ POLYPECTOMY   "   • DILATION AND CURETTAGE, DIAGNOSTIC / THERAPEUTIC     • HYSTERECTOMY      Total Abdominal Hysterectomy with Removal of Both Ovaries   • TUBAL ABDOMINAL LIGATION     • UPPER GASTROINTESTINAL ENDOSCOPY      Diagnostic       Family History   Problem Relation Age of Onset   • Prostate cancer Father    • Osteoporosis Mother    • Diabetes Sister        Social History     Socioeconomic History   • Marital status:      Spouse name: Not on file   • Number of children: Not on file   • Years of education: Not on file   • Highest education level: Not on file   Tobacco Use   • Smoking status: Never Smoker   • Smokeless tobacco: Never Used   Substance and Sexual Activity   • Alcohol use: No   • Drug use: No   • Sexual activity: Defer         Current Facility-Administered Medications:   •  lactated ringers infusion, 50 mL/hr, Intravenous, Continuous, Elina Summers MD, Last Rate: 50 mL/hr at 12/07/18 0707, 50 mL/hr at 12/07/18 0707    Medications Prior to Admission   Medication Sig Dispense Refill Last Dose   • aspirin 81 MG chewable tablet Chew 81 mg Daily.   12/7/2018   • bisacodyl (bisacodyl) 5 MG EC tablet Take 1 tablet by mouth Daily As Needed for Constipation. 4 tablet 0 12/6/2018 at 1300   • carvedilol (COREG) 6.25 MG tablet Take 6.25 mg by mouth 2 (Two) Times a Day With Meals.   12/7/2018 at 0642   • cetirizine (zyrTEC) 10 MG tablet Take 10 mg by mouth Daily.   Past Week at Unknown time   • FLUoxetine (PROzac) 20 MG capsule Take 20 mg by mouth Daily.   12/5/2018   • levothyroxine (SYNTHROID, LEVOTHROID) 75 MCG tablet Take 75 mcg by mouth Daily.   12/6/2018 at 0700   • lisinopril (PRINIVIL,ZESTRIL) 20 MG tablet Take 20 mg by mouth Daily.   12/7/2018 at 0642   • metFORMIN (GLUCOPHAGE) 500 MG tablet Take 500 mg by mouth 2 (Two) Times a Day With Meals.   12/6/2018 at 0700   • pantoprazole (PROTONIX) 40 MG EC tablet Take 1 tablet by mouth Daily. 30 tablet 10 12/6/2018 at 0700   • pravastatin (PRAVACHOL) 40 MG  tablet Take 40 mg by mouth Daily.   Past Week at Unknown time   • Prenatal Vit-Fe Fumarate-FA (PRENATAL 27-1) 27-1 MG tablet tablet Take 1 tablet by mouth Daily.   Past Week at Unknown time   • SM 8 HOUR PAIN RELIEF 650 MG 8 hr tablet Take 650 mg by mouth Every 8 (Eight) Hours As Needed.   Past Week at Unknown time   • tiZANidine (ZANAFLEX) 2 MG tablet Take 2 mg by mouth Every 6 (Six) Hours As Needed.   Past Week at Unknown time   • albuterol (PROVENTIL HFA;VENTOLIN HFA) 108 (90 Base) MCG/ACT inhaler Inhale 2 puffs Every 4 (Four) Hours As Needed for Wheezing.   Taking   • gabapentin (NEURONTIN) 800 MG tablet    Taking       Allergies   Allergen Reactions   • Ciprofloxacin Hives         Review of Systems   Constitutional: Negative.    HENT: Negative.    Eyes: Negative.    Respiratory: Negative.    Cardiovascular: Negative.    Gastrointestinal: Negative.    Endocrine: Negative.    Genitourinary: Negative.    Musculoskeletal: Negative.    Skin: Negative.    Allergic/Immunologic: Negative.    Neurological: Negative.    Hematological: Negative.    Psychiatric/Behavioral: Negative.        Objective      Vital Signs  Temp:  [97 °F (36.1 °C)] 97 °F (36.1 °C)  Heart Rate:  [75] 75  Resp:  [18] 18  BP: (164-181)/(107-112) 164/107    No intake/output data recorded.  No intake/output data recorded.      Physical Exam:      General Appearance:    Alert, cooperative, in no acute distress   Head:    Normocephalic, without obvious abnormality, atraumatic   Eyes:            Lids and lashes normal, conjunctivae and sclerae normal, no   icterus, no pallor, corneas clear, PERRLA   Ears:    Ears appear intact with no abnormalities noted   Throat:   No oral lesions, no thrush, oral mucosa moist   Neck:   No adenopathy, supple, trachea midline, no thyromegaly, no     carotid bruit, no JVD   Back:     No kyphosis present, no scoliosis present, no skin lesions,       erythema or scars, no tenderness to percussion or                    palpation,   range of motion normal   Lungs:     Clear to auscultation,respirations regular, even and                   unlabored    Heart:    Regular rhythm and normal rate, normal S1 and S2, no            murmur, no gallop, no rub, no click   Breast Exam:    Deferred   Abdomen:     Normal bowel sounds, no masses, no organomegaly, soft        non-tender, non-distended, no guarding, no rebound                 tenderness   Genitalia:    Deferred   Extremities:   Moves all extremities well, no edema, no cyanosis, no              redness   Pulses:   Pulses palpable and equal bilaterally   Skin:   No bleeding, bruising or rash   Lymph nodes:   No palpable adenopathy   Neurologic:   Cranial nerves 2 - 12 grossly intact, sensation intact, DTR        present and equal bilaterally       Results Review:  I have reviewed the entirety of the patient's clinical lab results.  I have also personally reviewed the patient's imaging      Lab Results (last 72 hours)     Procedure Component Value Units Date/Time    POC Glucose Once [[id number]]  (Normal) Collected:  12/07/18 0629    Specimen:  Blood Updated:  12/07/18 0636     Glucose 102 mg/dL      Comment: Serial Number: [id number]:  [id number]                             Assessment/Plan       Personal history of colon cancer    We discussed colonoscopy for colon cancer screening purposes.  We discussed the indications for screening colonoscopy as well as the risks, benefits and alternatives to this procedure. Risks including but not limited to perforation, bleeding,need for blood transfusion or emergent surgery ,and missed neoplasm were reviewed in detail with the patient.  The patient was given an opportunity to ask questions.  The patient verbalized understanding of these recommendations and the plan of care. The patient is willing to proceed with colonoscopy and has signed informed consent.          Elina Summers MD  12/07/18  7:34 AM

## 2019-01-11 RX ORDER — PANTOPRAZOLE SODIUM 40 MG/1
TABLET, DELAYED RELEASE ORAL
Qty: 30 TABLET | Refills: 0 | Status: SHIPPED | OUTPATIENT
Start: 2019-01-11 | End: 2019-03-05 | Stop reason: SDUPTHER

## 2019-03-05 RX ORDER — PANTOPRAZOLE SODIUM 40 MG/1
TABLET, DELAYED RELEASE ORAL
Qty: 30 TABLET | Refills: 0 | Status: SHIPPED | OUTPATIENT
Start: 2019-03-05 | End: 2019-04-30 | Stop reason: SDUPTHER

## 2019-04-30 RX ORDER — PANTOPRAZOLE SODIUM 40 MG/1
TABLET, DELAYED RELEASE ORAL
Qty: 30 TABLET | Refills: 0 | Status: SHIPPED | OUTPATIENT
Start: 2019-04-30 | End: 2019-06-11 | Stop reason: SDUPTHER

## 2019-06-11 RX ORDER — PANTOPRAZOLE SODIUM 40 MG/1
TABLET, DELAYED RELEASE ORAL
Qty: 30 TABLET | Refills: 0 | Status: SHIPPED | OUTPATIENT
Start: 2019-06-11 | End: 2019-07-10 | Stop reason: SDUPTHER

## 2019-07-11 RX ORDER — PANTOPRAZOLE SODIUM 40 MG/1
TABLET, DELAYED RELEASE ORAL
Qty: 30 TABLET | Refills: 0 | Status: SHIPPED | OUTPATIENT
Start: 2019-07-11 | End: 2019-08-26 | Stop reason: SDUPTHER

## 2019-08-26 RX ORDER — PANTOPRAZOLE SODIUM 40 MG/1
TABLET, DELAYED RELEASE ORAL
Qty: 30 TABLET | Refills: 0 | Status: SHIPPED | OUTPATIENT
Start: 2019-08-26 | End: 2020-09-11 | Stop reason: HOSPADM

## 2020-05-18 ENCOUNTER — TELEPHONE (OUTPATIENT)
Dept: SURGERY | Facility: CLINIC | Age: 50
End: 2020-05-18

## 2020-05-19 ENCOUNTER — TELEPHONE (OUTPATIENT)
Dept: SURGERY | Facility: CLINIC | Age: 50
End: 2020-05-19

## 2020-07-23 RX ORDER — BISACODYL 5 MG/1
TABLET, DELAYED RELEASE ORAL
Qty: 4 TABLET | Refills: 0 | Status: SHIPPED | OUTPATIENT
Start: 2020-07-23 | End: 2020-09-11 | Stop reason: HOSPADM

## 2020-07-23 RX ORDER — POLYETHYLENE GLYCOL 3350 17 G/17G
POWDER, FOR SOLUTION ORAL
Qty: 238 G | Refills: 0 | Status: SHIPPED | OUTPATIENT
Start: 2020-07-23 | End: 2020-09-11 | Stop reason: HOSPADM

## 2020-07-28 DIAGNOSIS — Z01.818 PREOP TESTING: Primary | ICD-10-CM

## 2020-07-30 ENCOUNTER — PREP FOR SURGERY (OUTPATIENT)
Dept: OTHER | Facility: HOSPITAL | Age: 50
End: 2020-07-30

## 2020-07-30 DIAGNOSIS — Z85.038 ENCOUNTER FOR COLONOSCOPY DUE TO HISTORY OF COLON CANCER: Primary | ICD-10-CM

## 2020-07-30 DIAGNOSIS — Z12.11 ENCOUNTER FOR COLONOSCOPY DUE TO HISTORY OF COLON CANCER: Primary | ICD-10-CM

## 2020-07-30 DIAGNOSIS — Z85.038 PERSONAL HISTORY OF COLON CANCER: ICD-10-CM

## 2020-07-30 RX ORDER — SODIUM CHLORIDE 0.9 % (FLUSH) 0.9 %
10 SYRINGE (ML) INJECTION AS NEEDED
Status: CANCELLED | OUTPATIENT
Start: 2020-07-30

## 2020-07-30 RX ORDER — SODIUM CHLORIDE 0.9 % (FLUSH) 0.9 %
3 SYRINGE (ML) INJECTION EVERY 12 HOURS SCHEDULED
Status: CANCELLED | OUTPATIENT
Start: 2020-07-30

## 2020-07-30 RX ORDER — SODIUM CHLORIDE, SODIUM LACTATE, POTASSIUM CHLORIDE, CALCIUM CHLORIDE 600; 310; 30; 20 MG/100ML; MG/100ML; MG/100ML; MG/100ML
50 INJECTION, SOLUTION INTRAVENOUS CONTINUOUS
Status: CANCELLED | OUTPATIENT
Start: 2020-07-30

## 2020-07-30 RX ORDER — OMEPRAZOLE 40 MG/1
40 CAPSULE, DELAYED RELEASE ORAL DAILY
COMMUNITY
End: 2020-09-11 | Stop reason: HOSPADM

## 2020-07-31 ENCOUNTER — TELEPHONE (OUTPATIENT)
Dept: SURGERY | Facility: CLINIC | Age: 50
End: 2020-07-31

## 2020-08-21 ENCOUNTER — TELEPHONE (OUTPATIENT)
Dept: SURGERY | Facility: CLINIC | Age: 50
End: 2020-08-21

## 2020-09-08 ENCOUNTER — LAB (OUTPATIENT)
Dept: LAB | Facility: HOSPITAL | Age: 50
End: 2020-09-08

## 2020-09-08 DIAGNOSIS — Z01.818 PREOP TESTING: ICD-10-CM

## 2020-09-08 PROCEDURE — U0004 COV-19 TEST NON-CDC HGH THRU: HCPCS

## 2020-09-08 PROCEDURE — C9803 HOPD COVID-19 SPEC COLLECT: HCPCS

## 2020-09-09 LAB — SARS-COV-2 RNA NOSE QL NAA+PROBE: NOT DETECTED

## 2020-09-11 ENCOUNTER — ANESTHESIA EVENT (OUTPATIENT)
Dept: GASTROENTEROLOGY | Facility: HOSPITAL | Age: 50
End: 2020-09-11

## 2020-09-11 ENCOUNTER — ANESTHESIA (OUTPATIENT)
Dept: GASTROENTEROLOGY | Facility: HOSPITAL | Age: 50
End: 2020-09-11

## 2020-09-11 ENCOUNTER — HOSPITAL ENCOUNTER (OUTPATIENT)
Facility: HOSPITAL | Age: 50
Setting detail: HOSPITAL OUTPATIENT SURGERY
Discharge: HOME | End: 2020-09-11
Attending: SURGERY | Admitting: SURGERY

## 2020-09-11 VITALS
OXYGEN SATURATION: 96 % | SYSTOLIC BLOOD PRESSURE: 153 MMHG | RESPIRATION RATE: 18 BRPM | WEIGHT: 206 LBS | DIASTOLIC BLOOD PRESSURE: 79 MMHG | HEART RATE: 62 BPM | HEIGHT: 64 IN | TEMPERATURE: 98.8 F | BODY MASS INDEX: 35.17 KG/M2

## 2020-09-11 DIAGNOSIS — Z85.038 PERSONAL HISTORY OF COLON CANCER: ICD-10-CM

## 2020-09-11 DIAGNOSIS — Z85.038 ENCOUNTER FOR COLONOSCOPY DUE TO HISTORY OF COLON CANCER: ICD-10-CM

## 2020-09-11 DIAGNOSIS — Z12.11 ENCOUNTER FOR COLONOSCOPY DUE TO HISTORY OF COLON CANCER: ICD-10-CM

## 2020-09-11 LAB — GLUCOSE BLDC GLUCOMTR-MCNC: 102 MG/DL (ref 70–130)

## 2020-09-11 PROCEDURE — 82962 GLUCOSE BLOOD TEST: CPT

## 2020-09-11 PROCEDURE — 25010000002 FENTANYL CITRATE (PF) 100 MCG/2ML SOLUTION: Performed by: NURSE ANESTHETIST, CERTIFIED REGISTERED

## 2020-09-11 PROCEDURE — 25010000002 HALOPERIDOL LACTATE PER 5 MG: Performed by: NURSE ANESTHETIST, CERTIFIED REGISTERED

## 2020-09-11 PROCEDURE — S0260 H&P FOR SURGERY: HCPCS | Performed by: SURGERY

## 2020-09-11 PROCEDURE — 25010000002 MIDAZOLAM PER 1MG: Performed by: NURSE ANESTHETIST, CERTIFIED REGISTERED

## 2020-09-11 PROCEDURE — 45385 COLONOSCOPY W/LESION REMOVAL: CPT | Performed by: SURGERY

## 2020-09-11 PROCEDURE — 25010000002 PROPOFOL 10 MG/ML EMULSION: Performed by: NURSE ANESTHETIST, CERTIFIED REGISTERED

## 2020-09-11 RX ORDER — FENTANYL CITRATE 50 UG/ML
INJECTION, SOLUTION INTRAMUSCULAR; INTRAVENOUS AS NEEDED
Status: DISCONTINUED | OUTPATIENT
Start: 2020-09-11 | End: 2020-09-11 | Stop reason: SURG

## 2020-09-11 RX ORDER — PANTOPRAZOLE SODIUM 40 MG/1
40 TABLET, DELAYED RELEASE ORAL DAILY
Qty: 30 TABLET | Refills: 0 | Status: SHIPPED | OUTPATIENT
Start: 2020-09-11 | End: 2022-05-19 | Stop reason: SDUPTHER

## 2020-09-11 RX ORDER — SODIUM CHLORIDE 0.9 % (FLUSH) 0.9 %
3 SYRINGE (ML) INJECTION EVERY 12 HOURS SCHEDULED
Status: DISCONTINUED | OUTPATIENT
Start: 2020-09-11 | End: 2020-09-11 | Stop reason: HOSPADM

## 2020-09-11 RX ORDER — MIDAZOLAM HYDROCHLORIDE 2 MG/2ML
INJECTION, SOLUTION INTRAMUSCULAR; INTRAVENOUS AS NEEDED
Status: DISCONTINUED | OUTPATIENT
Start: 2020-09-11 | End: 2020-09-11 | Stop reason: SURG

## 2020-09-11 RX ORDER — HALOPERIDOL 5 MG/ML
INJECTION INTRAMUSCULAR AS NEEDED
Status: DISCONTINUED | OUTPATIENT
Start: 2020-09-11 | End: 2020-09-11 | Stop reason: SURG

## 2020-09-11 RX ORDER — PANTOPRAZOLE SODIUM 40 MG/1
40 TABLET, DELAYED RELEASE ORAL DAILY
Qty: 30 TABLET | Refills: 6 | Status: SHIPPED | OUTPATIENT
Start: 2020-10-11 | End: 2022-05-19

## 2020-09-11 RX ORDER — KETAMINE HCL IN NACL, ISO-OSM 100MG/10ML
SYRINGE (ML) INJECTION AS NEEDED
Status: DISCONTINUED | OUTPATIENT
Start: 2020-09-11 | End: 2020-09-11 | Stop reason: SURG

## 2020-09-11 RX ORDER — METOPROLOL TARTRATE 5 MG/5ML
2.5 INJECTION INTRAVENOUS EVERY 6 HOURS
Status: DISCONTINUED | OUTPATIENT
Start: 2020-09-11 | End: 2020-09-11 | Stop reason: HOSPADM

## 2020-09-11 RX ORDER — METOPROLOL TARTRATE 5 MG/5ML
INJECTION INTRAVENOUS
Status: COMPLETED
Start: 2020-09-11 | End: 2020-09-11

## 2020-09-11 RX ORDER — PROPOFOL 10 MG/ML
VIAL (ML) INTRAVENOUS AS NEEDED
Status: DISCONTINUED | OUTPATIENT
Start: 2020-09-11 | End: 2020-09-11 | Stop reason: SURG

## 2020-09-11 RX ORDER — SODIUM CHLORIDE 0.9 % (FLUSH) 0.9 %
10 SYRINGE (ML) INJECTION AS NEEDED
Status: DISCONTINUED | OUTPATIENT
Start: 2020-09-11 | End: 2020-09-11 | Stop reason: HOSPADM

## 2020-09-11 RX ORDER — SODIUM CHLORIDE, SODIUM LACTATE, POTASSIUM CHLORIDE, CALCIUM CHLORIDE 600; 310; 30; 20 MG/100ML; MG/100ML; MG/100ML; MG/100ML
50 INJECTION, SOLUTION INTRAVENOUS CONTINUOUS
Status: DISCONTINUED | OUTPATIENT
Start: 2020-09-11 | End: 2020-09-11 | Stop reason: HOSPADM

## 2020-09-11 RX ADMIN — HALOPERIDOL LACTATE 0.5 MG: 5 INJECTION, SOLUTION INTRAMUSCULAR at 09:38

## 2020-09-11 RX ADMIN — PROPOFOL 30 MG: 10 INJECTION, EMULSION INTRAVENOUS at 10:06

## 2020-09-11 RX ADMIN — PROPOFOL 30 MG: 10 INJECTION, EMULSION INTRAVENOUS at 09:49

## 2020-09-11 RX ADMIN — SODIUM CHLORIDE, POTASSIUM CHLORIDE, SODIUM LACTATE AND CALCIUM CHLORIDE 50 ML/HR: 600; 310; 30; 20 INJECTION, SOLUTION INTRAVENOUS at 08:31

## 2020-09-11 RX ADMIN — PROPOFOL 30 MG: 10 INJECTION, EMULSION INTRAVENOUS at 09:42

## 2020-09-11 RX ADMIN — Medication 25 MG: at 09:35

## 2020-09-11 RX ADMIN — MIDAZOLAM HYDROCHLORIDE 2 MG: 1 INJECTION, SOLUTION INTRAMUSCULAR; INTRAVENOUS at 09:32

## 2020-09-11 RX ADMIN — METOPROLOL TARTRATE 2.5 MG: 1 INJECTION, SOLUTION INTRAVENOUS at 08:32

## 2020-09-11 RX ADMIN — METOPROLOL TARTRATE 2.5 MG: 5 INJECTION INTRAVENOUS at 08:32

## 2020-09-11 RX ADMIN — PROPOFOL 30 MG: 10 INJECTION, EMULSION INTRAVENOUS at 10:12

## 2020-09-11 RX ADMIN — PROPOFOL 30 MG: 10 INJECTION, EMULSION INTRAVENOUS at 09:43

## 2020-09-11 RX ADMIN — PROPOFOL 30 MG: 10 INJECTION, EMULSION INTRAVENOUS at 10:00

## 2020-09-11 RX ADMIN — PROPOFOL 30 MG: 10 INJECTION, EMULSION INTRAVENOUS at 09:55

## 2020-09-11 RX ADMIN — FENTANYL CITRATE 50 MCG: 50 INJECTION, SOLUTION INTRAMUSCULAR; INTRAVENOUS at 09:37

## 2020-09-11 RX ADMIN — PROPOFOL 30 MG: 10 INJECTION, EMULSION INTRAVENOUS at 09:52

## 2020-09-11 RX ADMIN — SODIUM CHLORIDE, POTASSIUM CHLORIDE, SODIUM LACTATE AND CALCIUM CHLORIDE: 600; 310; 30; 20 INJECTION, SOLUTION INTRAVENOUS at 10:12

## 2020-09-11 RX ADMIN — PROPOFOL 30 MG: 10 INJECTION, EMULSION INTRAVENOUS at 09:46

## 2020-09-11 RX ADMIN — PROPOFOL 30 MG: 10 INJECTION, EMULSION INTRAVENOUS at 10:03

## 2020-09-11 RX ADMIN — Medication 25 MG: at 09:42

## 2020-09-11 RX ADMIN — FENTANYL CITRATE 50 MCG: 50 INJECTION, SOLUTION INTRAMUSCULAR; INTRAVENOUS at 09:33

## 2020-09-11 RX ADMIN — PROPOFOL 30 MG: 10 INJECTION, EMULSION INTRAVENOUS at 09:58

## 2020-09-11 RX ADMIN — PROPOFOL 30 MG: 10 INJECTION, EMULSION INTRAVENOUS at 10:09

## 2020-09-11 RX ADMIN — PROPOFOL 30 MG: 10 INJECTION, EMULSION INTRAVENOUS at 09:37

## 2020-09-11 NOTE — ANESTHESIA POSTPROCEDURE EVALUATION
Patient: Rehana Hamilton    Procedure Summary     Date:  09/11/20 Room / Location:  [facility] ENDOSCOPY 3 / [facility] ENDOSCOPY    Anesthesia Start:  0930 Anesthesia Stop:      Procedure:  COLONOSCOPY W/ COLD SNARE POLYPECTOMY (N/A ) Diagnosis:       Encounter for colonoscopy due to history of colon cancer      Personal history of colon cancer      (Encounter for colonoscopy due to history of colon cancer [Z12.11, Z85.038])      (Personal history of colon cancer [Z85.038])    Surgeon:  Elina Summers MD Provider:  Milton Carson CRNA    Anesthesia Type:  MAC ASA Status:  3          Anesthesia Type: MAC    Vitals  No vitals data found for the desired time range.          Post Anesthesia Care and Evaluation    Patient location during evaluation: PHASE II  Patient participation: complete - patient participated  Level of consciousness: awake  Pain score: 0  Pain management: adequate  Airway patency: patent  Anesthetic complications: No anesthetic complications  PONV Status: none  Cardiovascular status: acceptable  Respiratory status: acceptable and nasal cannula  Hydration status: acceptable    Comments: vsss resp spont, reflexes intact, responsive, report given to pacu nurse

## 2020-09-11 NOTE — ANESTHESIA PREPROCEDURE EVALUATION
Anesthesia Evaluation     Patient summary reviewed and Nursing notes reviewed   no history of anesthetic complications:  NPO Solid Status: > 8 hours  NPO Liquid Status: > 8 hours           Airway   Mallampati: II  Neck ROM: full  no difficulty expected  Dental - normal exam     Pulmonary - normal exam    breath sounds clear to auscultation  (+) COPD mild, shortness of breath, sleep apnea,   (-) not a smoker  Cardiovascular - normal exam  Exercise tolerance: good (4-7 METS)    ECG reviewed  Patient on routine beta blocker and Beta blocker given within 24 hours of surgery  Rhythm: regular  Rate: normal    (+) hypertension well controlled, CHF , SMITH, hyperlipidemia,   CAD:  inc risk morb obese, julia, dm inc risk.      Neuro/Psych  (+) headaches, psychiatric history Depression,     GI/Hepatic/Renal/Endo    (+) obesity, morbid obesity, GERD poorly controlled,  diabetes mellitus type 2 well controlled, thyroid problem hypothyroidism    Musculoskeletal     Abdominal   (+) obese,    Substance History - negative use     OB/GYN negative ob/gyn ROS         Other   arthritis,    history of cancer remission    ROS/Med Hx Other: Hx colon CA                    Anesthesia Plan    ASA 3     MAC   (Risks and benefits discussed including risk of aspiration, recall and dental damage. All patient questions answered. Will continue with POC.)    Anesthetic plan, all risks, benefits, and alternatives have been provided, discussed and informed consent has been obtained with: patient.    Plan discussed with CRNA.

## 2020-09-15 LAB
LAB AP CASE REPORT: NORMAL
PATH REPORT.FINAL DX SPEC: NORMAL

## 2020-10-06 NOTE — PROGRESS NOTES
Please inform the patient that the tissue I removed during her recent colonoscopy that appeared to be a polyp, was in fact completely benign colon tissue.  It would certainly follow within the recommendations for colon cancer surveillance that we could push her out to a 3-year follow-up at this point.  However, if her oncologist wishes for her to continue with yearly exams, she may be put on a 1 year recall.  I will leave it up to the patient and her oncologist how she would like to proceed.

## 2022-05-19 RX ORDER — PANTOPRAZOLE SODIUM 40 MG/1
TABLET, DELAYED RELEASE ORAL
Qty: 30 TABLET | Refills: 6 | Status: SHIPPED | OUTPATIENT
Start: 2022-05-19

## 2022-05-23 NOTE — TELEPHONE ENCOUNTER
How Severe Is Your Acne?: mild Please schedule 1 year recall for colon.    Is This A New Presentation, Or A Follow-Up?: Follow Up Acne

## 2023-10-06 PROBLEM — I10 HYPERTENSION: Status: ACTIVE | Noted: 2023-05-12

## 2023-10-06 PROBLEM — R60.0 PEDAL EDEMA: Status: ACTIVE | Noted: 2023-05-12

## 2023-10-06 PROBLEM — I26.99 PULMONARY EMBOLISM WITH INFARCTION: Chronic | Status: ACTIVE | Noted: 2023-07-05

## 2023-10-06 PROBLEM — K92.1 BLOOD IN FECES: Status: ACTIVE | Noted: 2023-10-06

## 2023-10-06 PROBLEM — R06.02 SOB (SHORTNESS OF BREATH): Status: ACTIVE | Noted: 2023-05-12

## 2023-10-06 PROBLEM — C18.4 CANCER OF TRANSVERSE COLON: Status: ACTIVE | Noted: 2023-10-06

## 2023-10-06 PROBLEM — J81.0 ACUTE PULMONARY EDEMA: Chronic | Status: ACTIVE | Noted: 2023-05-12

## 2023-10-06 PROBLEM — R11.0 CHRONIC NAUSEA: Status: ACTIVE | Noted: 2023-10-06

## 2023-10-06 PROBLEM — R10.9 ABDOMINAL PAIN: Status: ACTIVE | Noted: 2023-10-06

## 2023-10-06 PROBLEM — K21.00 ESOPHAGITIS, REFLUX: Status: ACTIVE | Noted: 2023-10-06

## 2023-10-06 PROBLEM — Z91.199 MEDICALLY NONCOMPLIANT: Status: ACTIVE | Noted: 2023-05-12

## 2023-10-10 ENCOUNTER — TELEPHONE (OUTPATIENT)
Dept: SURGERY | Facility: CLINIC | Age: 53
End: 2023-10-10

## 2023-10-11 NOTE — TELEPHONE ENCOUNTER
Called patient to reschedule no show appointment.  Patient on 3 year colonoscopy recall.  Last colonoscopy 9/1/2020.  No answer. Mailed recall letter.

## 2023-11-09 ENCOUNTER — TELEPHONE (OUTPATIENT)
Dept: CARDIOLOGY | Facility: CLINIC | Age: 53
End: 2023-11-09

## 2025-03-29 ENCOUNTER — APPOINTMENT (OUTPATIENT)
Dept: CT IMAGING | Facility: HOSPITAL | Age: 55
End: 2025-03-29
Payer: COMMERCIAL

## 2025-03-29 ENCOUNTER — HOSPITAL ENCOUNTER (OUTPATIENT)
Facility: HOSPITAL | Age: 55
Setting detail: OBSERVATION
Discharge: NURSING, REHAB OR LONG-TERM CARE | End: 2025-04-04
Attending: EMERGENCY MEDICINE | Admitting: FAMILY MEDICINE
Payer: COMMERCIAL

## 2025-03-29 ENCOUNTER — APPOINTMENT (OUTPATIENT)
Dept: GENERAL RADIOLOGY | Facility: HOSPITAL | Age: 55
End: 2025-03-29
Payer: COMMERCIAL

## 2025-03-29 ENCOUNTER — APPOINTMENT (OUTPATIENT)
Dept: MRI IMAGING | Facility: HOSPITAL | Age: 55
End: 2025-03-29
Payer: COMMERCIAL

## 2025-03-29 DIAGNOSIS — G43.809 HEADACHE, VARIANT MIGRAINE: ICD-10-CM

## 2025-03-29 DIAGNOSIS — R47.1 DYSARTHRIA: ICD-10-CM

## 2025-03-29 DIAGNOSIS — R53.83 LETHARGY: ICD-10-CM

## 2025-03-29 DIAGNOSIS — R53.1 ACUTE LEFT-SIDED WEAKNESS: Primary | ICD-10-CM

## 2025-03-29 PROBLEM — G45.9 TIA (TRANSIENT ISCHEMIC ATTACK): Status: ACTIVE | Noted: 2025-03-29

## 2025-03-29 LAB
ALT SERPL W P-5'-P-CCNC: 9 U/L (ref 1–33)
AMPHET+METHAMPHET UR QL: NEGATIVE
AMPHETAMINES UR QL: NEGATIVE
APTT PPP: 30.9 SECONDS (ref 22–39)
ARTERIAL PATENCY WRIST A: ABNORMAL
AST SERPL-CCNC: 15 U/L (ref 1–32)
ATMOSPHERIC PRESS: ABNORMAL MM[HG]
BARBITURATES UR QL SCN: NEGATIVE
BASE EXCESS BLDA CALC-SCNC: -1.2 MMOL/L (ref 0–2)
BASOPHILS # BLD AUTO: 0.05 10*3/MM3 (ref 0–0.2)
BASOPHILS NFR BLD AUTO: 0.6 % (ref 0–1.5)
BDY SITE: ABNORMAL
BENZODIAZ UR QL SCN: NEGATIVE
BILIRUB UR QL STRIP: NEGATIVE
BODY TEMPERATURE: 37
BUN BLDA-MCNC: 13 MG/DL (ref 8–26)
BUPRENORPHINE SERPL-MCNC: NEGATIVE NG/ML
CA-I BLDA-SCNC: 1.26 MMOL/L (ref 1.2–1.32)
CANNABINOIDS SERPL QL: NEGATIVE
CHLORIDE BLDA-SCNC: 104 MMOL/L (ref 98–109)
CLARITY UR: CLEAR
CO2 BLDA-SCNC: 24 MMOL/L (ref 24–29)
CO2 BLDA-SCNC: 24.5 MMOL/L (ref 22–33)
COCAINE UR QL: NEGATIVE
COHGB MFR BLD: 0.7 % (ref 0–2)
COLOR UR: YELLOW
CREAT BLDA-MCNC: 1 MG/DL (ref 0.6–1.3)
D-LACTATE SERPL-SCNC: 1.5 MMOL/L (ref 0.5–2)
DEPRECATED RDW RBC AUTO: 44.5 FL (ref 37–54)
EGFRCR SERPLBLD CKD-EPI 2021: 67.1 ML/MIN/1.73
EOSINOPHIL # BLD AUTO: 0.14 10*3/MM3 (ref 0–0.4)
EOSINOPHIL NFR BLD AUTO: 1.8 % (ref 0.3–6.2)
ERYTHROCYTE [DISTWIDTH] IN BLOOD BY AUTOMATED COUNT: 12.3 % (ref 12.3–15.4)
ETHANOL BLD-MCNC: <10 MG/DL (ref 0–10)
FENTANYL UR-MCNC: NEGATIVE NG/ML
GLUCOSE BLDC GLUCOMTR-MCNC: 103 MG/DL (ref 70–130)
GLUCOSE BLDC GLUCOMTR-MCNC: 97 MG/DL (ref 70–130)
GLUCOSE UR STRIP-MCNC: ABNORMAL MG/DL
HCO3 BLDA-SCNC: 23.3 MMOL/L (ref 20–26)
HCT VFR BLD AUTO: 45.9 % (ref 34–46.6)
HCT VFR BLD CALC: 46.4 % (ref 38–51)
HCT VFR BLDA CALC: 44 % (ref 38–51)
HGB BLD-MCNC: 15 G/DL (ref 12–15.9)
HGB BLDA-MCNC: 15 G/DL (ref 12–17)
HGB BLDA-MCNC: 15.2 G/DL (ref 14–18)
HGB UR QL STRIP.AUTO: NEGATIVE
HOLD SPECIMEN: NORMAL
IMM GRANULOCYTES # BLD AUTO: 0.01 10*3/MM3 (ref 0–0.05)
IMM GRANULOCYTES NFR BLD AUTO: 0.1 % (ref 0–0.5)
INHALED O2 CONCENTRATION: 21 %
INR PPP: 1.01 (ref 0.89–1.12)
KETONES UR QL STRIP: NEGATIVE
LEUKOCYTE ESTERASE UR QL STRIP.AUTO: NEGATIVE
LYMPHOCYTES # BLD AUTO: 2.42 10*3/MM3 (ref 0.7–3.1)
LYMPHOCYTES NFR BLD AUTO: 31.2 % (ref 19.6–45.3)
MCH RBC QN AUTO: 31.8 PG (ref 26.6–33)
MCHC RBC AUTO-ENTMCNC: 32.7 G/DL (ref 31.5–35.7)
MCV RBC AUTO: 97.2 FL (ref 79–97)
METHADONE UR QL SCN: NEGATIVE
METHGB BLD QL: 0.3 % (ref 0–1.5)
MODALITY: ABNORMAL
MONOCYTES # BLD AUTO: 0.66 10*3/MM3 (ref 0.1–0.9)
MONOCYTES NFR BLD AUTO: 8.5 % (ref 5–12)
NEUTROPHILS NFR BLD AUTO: 4.47 10*3/MM3 (ref 1.7–7)
NEUTROPHILS NFR BLD AUTO: 57.8 % (ref 42.7–76)
NITRITE UR QL STRIP: NEGATIVE
NRBC BLD AUTO-RTO: 0 /100 WBC (ref 0–0.2)
OPIATES UR QL: NEGATIVE
OXYCODONE UR QL SCN: NEGATIVE
OXYHGB MFR BLDV: 97.3 % (ref 94–99)
PCO2 BLDA: 37.7 MM HG (ref 35–45)
PCO2 TEMP ADJ BLD: 37.7 MM HG (ref 35–45)
PCP UR QL SCN: NEGATIVE
PEEP RESPIRATORY: 0 CM[H2O]
PH BLDA: 7.4 PH UNITS (ref 7.35–7.45)
PH UR STRIP.AUTO: 6 [PH] (ref 5–8)
PH, TEMP CORRECTED: 7.4 PH UNITS
PLATELET # BLD AUTO: 252 10*3/MM3 (ref 140–450)
PMV BLD AUTO: 9.9 FL (ref 6–12)
PO2 BLDA: 101 MM HG (ref 83–108)
PO2 TEMP ADJ BLD: 101 MM HG (ref 83–108)
POTASSIUM BLDA-SCNC: 4 MMOL/L (ref 3.5–4.9)
PROT UR QL STRIP: NEGATIVE
PROTHROMBIN TIME: 13.4 SECONDS (ref 12.2–14.5)
QT INTERVAL: 432 MS
QTC INTERVAL: 442 MS
RBC # BLD AUTO: 4.72 10*6/MM3 (ref 3.77–5.28)
SET MECH RESP RATE: 0
SODIUM BLD-SCNC: 137 MMOL/L (ref 138–146)
SP GR UR STRIP: 1.03 (ref 1–1.03)
TOTAL RATE: 0 BREATHS/MINUTE
TRICYCLICS UR QL SCN: NEGATIVE
UROBILINOGEN UR QL STRIP: ABNORMAL
VT ON VENT VENT: 0 ML
WBC NRBC COR # BLD AUTO: 7.75 10*3/MM3 (ref 3.4–10.8)
WHOLE BLOOD HOLD COAG: NORMAL
WHOLE BLOOD HOLD SPECIMEN: NORMAL

## 2025-03-29 PROCEDURE — 25510000001 IOPAMIDOL PER 1 ML: Performed by: EMERGENCY MEDICINE

## 2025-03-29 PROCEDURE — 96375 TX/PRO/DX INJ NEW DRUG ADDON: CPT

## 2025-03-29 PROCEDURE — 25010000002 AMPICILLIN-SULBACTAM PER 1.5 G: Performed by: FAMILY MEDICINE

## 2025-03-29 PROCEDURE — 85610 PROTHROMBIN TIME: CPT | Performed by: EMERGENCY MEDICINE

## 2025-03-29 PROCEDURE — G0378 HOSPITAL OBSERVATION PER HR: HCPCS

## 2025-03-29 PROCEDURE — 99291 CRITICAL CARE FIRST HOUR: CPT

## 2025-03-29 PROCEDURE — 83050 HGB METHEMOGLOBIN QUAN: CPT

## 2025-03-29 PROCEDURE — 81003 URINALYSIS AUTO W/O SCOPE: CPT | Performed by: EMERGENCY MEDICINE

## 2025-03-29 PROCEDURE — 36600 WITHDRAWAL OF ARTERIAL BLOOD: CPT

## 2025-03-29 PROCEDURE — 70496 CT ANGIOGRAPHY HEAD: CPT

## 2025-03-29 PROCEDURE — 84450 TRANSFERASE (AST) (SGOT): CPT | Performed by: EMERGENCY MEDICINE

## 2025-03-29 PROCEDURE — 85014 HEMATOCRIT: CPT | Performed by: EMERGENCY MEDICINE

## 2025-03-29 PROCEDURE — 0042T HC CT CEREBRAL PERFUSION W/WO CONTRAST: CPT

## 2025-03-29 PROCEDURE — 36415 COLL VENOUS BLD VENIPUNCTURE: CPT

## 2025-03-29 PROCEDURE — 90715 TDAP VACCINE 7 YRS/> IM: CPT | Performed by: FAMILY MEDICINE

## 2025-03-29 PROCEDURE — 85730 THROMBOPLASTIN TIME PARTIAL: CPT | Performed by: EMERGENCY MEDICINE

## 2025-03-29 PROCEDURE — 25010000002 TETANUS-DIPHTH-ACELL PERTUSSIS 5-2.5-18.5 LF-MCG/0.5 SUSPENSION PREFILLED SYRINGE: Performed by: FAMILY MEDICINE

## 2025-03-29 PROCEDURE — 96365 THER/PROPH/DIAG IV INF INIT: CPT

## 2025-03-29 PROCEDURE — 99285 EMERGENCY DEPT VISIT HI MDM: CPT

## 2025-03-29 PROCEDURE — 80307 DRUG TEST PRSMV CHEM ANLYZR: CPT | Performed by: EMERGENCY MEDICINE

## 2025-03-29 PROCEDURE — 82805 BLOOD GASES W/O2 SATURATION: CPT

## 2025-03-29 PROCEDURE — 99204 OFFICE O/P NEW MOD 45 MIN: CPT | Performed by: NURSE PRACTITIONER

## 2025-03-29 PROCEDURE — 83605 ASSAY OF LACTIC ACID: CPT | Performed by: EMERGENCY MEDICINE

## 2025-03-29 PROCEDURE — 85025 COMPLETE CBC W/AUTO DIFF WBC: CPT | Performed by: EMERGENCY MEDICINE

## 2025-03-29 PROCEDURE — 93005 ELECTROCARDIOGRAM TRACING: CPT | Performed by: EMERGENCY MEDICINE

## 2025-03-29 PROCEDURE — 84460 ALANINE AMINO (ALT) (SGPT): CPT | Performed by: EMERGENCY MEDICINE

## 2025-03-29 PROCEDURE — 70551 MRI BRAIN STEM W/O DYE: CPT

## 2025-03-29 PROCEDURE — 99223 1ST HOSP IP/OBS HIGH 75: CPT | Performed by: FAMILY MEDICINE

## 2025-03-29 PROCEDURE — 82375 ASSAY CARBOXYHB QUANT: CPT

## 2025-03-29 PROCEDURE — 82077 ASSAY SPEC XCP UR&BREATH IA: CPT | Performed by: EMERGENCY MEDICINE

## 2025-03-29 PROCEDURE — P9612 CATHETERIZE FOR URINE SPEC: HCPCS

## 2025-03-29 PROCEDURE — 80047 BASIC METABLC PNL IONIZED CA: CPT | Performed by: EMERGENCY MEDICINE

## 2025-03-29 PROCEDURE — 71045 X-RAY EXAM CHEST 1 VIEW: CPT

## 2025-03-29 PROCEDURE — 82948 REAGENT STRIP/BLOOD GLUCOSE: CPT

## 2025-03-29 PROCEDURE — 70450 CT HEAD/BRAIN W/O DYE: CPT

## 2025-03-29 PROCEDURE — 90471 IMMUNIZATION ADMIN: CPT | Performed by: FAMILY MEDICINE

## 2025-03-29 PROCEDURE — 70498 CT ANGIOGRAPHY NECK: CPT

## 2025-03-29 RX ORDER — DOCUSATE SODIUM 100 MG/1
100 CAPSULE, LIQUID FILLED ORAL 2 TIMES DAILY PRN
COMMUNITY

## 2025-03-29 RX ORDER — IOPAMIDOL 755 MG/ML
150 INJECTION, SOLUTION INTRAVASCULAR
Status: COMPLETED | OUTPATIENT
Start: 2025-03-29 | End: 2025-03-29

## 2025-03-29 RX ORDER — ATORVASTATIN CALCIUM 80 MG/1
80 TABLET, FILM COATED ORAL NIGHTLY
COMMUNITY

## 2025-03-29 RX ORDER — POLYETHYLENE GLYCOL 3350 17 G/17G
17 POWDER, FOR SOLUTION ORAL DAILY PRN
Status: DISCONTINUED | OUTPATIENT
Start: 2025-03-29 | End: 2025-04-04 | Stop reason: HOSPADM

## 2025-03-29 RX ORDER — BISACODYL 5 MG/1
5 TABLET, DELAYED RELEASE ORAL DAILY PRN
Status: DISCONTINUED | OUTPATIENT
Start: 2025-03-29 | End: 2025-04-04 | Stop reason: HOSPADM

## 2025-03-29 RX ORDER — ACETAMINOPHEN 650 MG/1
650 SUPPOSITORY RECTAL ONCE
Status: COMPLETED | OUTPATIENT
Start: 2025-03-29 | End: 2025-03-29

## 2025-03-29 RX ORDER — BISACODYL 10 MG
10 SUPPOSITORY, RECTAL RECTAL DAILY PRN
Status: DISCONTINUED | OUTPATIENT
Start: 2025-03-29 | End: 2025-04-04 | Stop reason: HOSPADM

## 2025-03-29 RX ORDER — ASPIRIN 300 MG/1
300 SUPPOSITORY RECTAL ONCE
Status: COMPLETED | OUTPATIENT
Start: 2025-03-29 | End: 2025-03-29

## 2025-03-29 RX ORDER — SUCRALFATE 1 G/1
1 TABLET ORAL
Status: DISCONTINUED | OUTPATIENT
Start: 2025-03-29 | End: 2025-04-04 | Stop reason: HOSPADM

## 2025-03-29 RX ORDER — ACETAMINOPHEN 500 MG
500 TABLET ORAL EVERY 6 HOURS PRN
Status: DISCONTINUED | OUTPATIENT
Start: 2025-03-29 | End: 2025-04-04 | Stop reason: HOSPADM

## 2025-03-29 RX ORDER — IBUPROFEN 600 MG/1
1 TABLET ORAL
Status: DISCONTINUED | OUTPATIENT
Start: 2025-03-29 | End: 2025-04-04 | Stop reason: HOSPADM

## 2025-03-29 RX ORDER — SODIUM CHLORIDE 0.9 % (FLUSH) 0.9 %
10 SYRINGE (ML) INJECTION AS NEEDED
Status: DISCONTINUED | OUTPATIENT
Start: 2025-03-29 | End: 2025-03-29

## 2025-03-29 RX ORDER — ASPIRIN 325 MG
325 TABLET ORAL ONCE
Status: COMPLETED | OUTPATIENT
Start: 2025-03-29 | End: 2025-03-29

## 2025-03-29 RX ORDER — CALCIUM CARBONATE 500 MG/1
1 TABLET, CHEWABLE ORAL 3 TIMES DAILY PRN
Status: DISCONTINUED | OUTPATIENT
Start: 2025-03-29 | End: 2025-04-04 | Stop reason: HOSPADM

## 2025-03-29 RX ORDER — CETIRIZINE HYDROCHLORIDE 10 MG/1
10 TABLET ORAL DAILY
Status: DISCONTINUED | OUTPATIENT
Start: 2025-03-30 | End: 2025-04-04 | Stop reason: HOSPADM

## 2025-03-29 RX ORDER — DEXTROSE MONOHYDRATE 25 G/50ML
25 INJECTION, SOLUTION INTRAVENOUS
Status: DISCONTINUED | OUTPATIENT
Start: 2025-03-29 | End: 2025-03-30 | Stop reason: SDUPTHER

## 2025-03-29 RX ORDER — ATORVASTATIN CALCIUM 40 MG/1
80 TABLET, FILM COATED ORAL NIGHTLY
Status: DISCONTINUED | OUTPATIENT
Start: 2025-03-29 | End: 2025-04-04 | Stop reason: HOSPADM

## 2025-03-29 RX ORDER — NICOTINE POLACRILEX 4 MG
15 LOZENGE BUCCAL
Status: DISCONTINUED | OUTPATIENT
Start: 2025-03-29 | End: 2025-03-30 | Stop reason: SDUPTHER

## 2025-03-29 RX ORDER — ACETAMINOPHEN 650 MG/1
325 SUPPOSITORY RECTAL EVERY 6 HOURS PRN
Status: DISCONTINUED | OUTPATIENT
Start: 2025-03-29 | End: 2025-04-04 | Stop reason: HOSPADM

## 2025-03-29 RX ORDER — SPIRONOLACTONE 25 MG/1
25 TABLET ORAL DAILY
COMMUNITY

## 2025-03-29 RX ORDER — SODIUM CHLORIDE 0.9 % (FLUSH) 0.9 %
10 SYRINGE (ML) INJECTION AS NEEDED
Status: DISCONTINUED | OUTPATIENT
Start: 2025-03-29 | End: 2025-04-04 | Stop reason: HOSPADM

## 2025-03-29 RX ORDER — NITROGLYCERIN 0.4 MG/1
0.4 TABLET SUBLINGUAL
Status: DISCONTINUED | OUTPATIENT
Start: 2025-03-29 | End: 2025-04-04 | Stop reason: HOSPADM

## 2025-03-29 RX ORDER — SACUBITRIL AND VALSARTAN 24; 26 MG/1; MG/1
1 TABLET, FILM COATED ORAL 2 TIMES DAILY
COMMUNITY

## 2025-03-29 RX ORDER — FLUTICASONE PROPIONATE 50 MCG
2 SPRAY, SUSPENSION (ML) NASAL DAILY PRN
COMMUNITY

## 2025-03-29 RX ORDER — LEVOTHYROXINE SODIUM 75 UG/1
75 TABLET ORAL
Status: DISCONTINUED | OUTPATIENT
Start: 2025-03-30 | End: 2025-04-04 | Stop reason: HOSPADM

## 2025-03-29 RX ORDER — AMOXICILLIN 250 MG
2 CAPSULE ORAL 2 TIMES DAILY PRN
Status: DISCONTINUED | OUTPATIENT
Start: 2025-03-29 | End: 2025-04-04 | Stop reason: HOSPADM

## 2025-03-29 RX ORDER — SODIUM CHLORIDE 9 MG/ML
40 INJECTION, SOLUTION INTRAVENOUS AS NEEDED
Status: DISCONTINUED | OUTPATIENT
Start: 2025-03-29 | End: 2025-04-04 | Stop reason: HOSPADM

## 2025-03-29 RX ORDER — PANTOPRAZOLE SODIUM 40 MG/10ML
40 INJECTION, POWDER, LYOPHILIZED, FOR SOLUTION INTRAVENOUS ONCE
Status: COMPLETED | OUTPATIENT
Start: 2025-03-29 | End: 2025-03-29

## 2025-03-29 RX ORDER — ACETAMINOPHEN 160 MG/5ML
500 SOLUTION ORAL EVERY 6 HOURS PRN
Status: DISCONTINUED | OUTPATIENT
Start: 2025-03-29 | End: 2025-04-04 | Stop reason: HOSPADM

## 2025-03-29 RX ORDER — SODIUM CHLORIDE 0.9 % (FLUSH) 0.9 %
10 SYRINGE (ML) INJECTION EVERY 12 HOURS SCHEDULED
Status: DISCONTINUED | OUTPATIENT
Start: 2025-03-29 | End: 2025-03-29

## 2025-03-29 RX ADMIN — ASPIRIN 300 MG: 300 SUPPOSITORY RECTAL at 19:51

## 2025-03-29 RX ADMIN — TETANUS TOXOID, REDUCED DIPHTHERIA TOXOID AND ACELLULAR PERTUSSIS VACCINE, ADSORBED 0.5 ML: 5; 2.5; 8; 8; 2.5 SUSPENSION INTRAMUSCULAR at 20:38

## 2025-03-29 RX ADMIN — PANTOPRAZOLE SODIUM 40 MG: 40 INJECTION, POWDER, FOR SOLUTION INTRAVENOUS at 20:26

## 2025-03-29 RX ADMIN — AMPICILLIN SODIUM AND SULBACTAM SODIUM 3 G: 2; 1 INJECTION, POWDER, FOR SOLUTION INTRAMUSCULAR; INTRAVENOUS at 22:16

## 2025-03-29 RX ADMIN — Medication 10 ML: at 22:18

## 2025-03-29 RX ADMIN — ACETAMINOPHEN 650 MG: 650 SUPPOSITORY RECTAL at 19:50

## 2025-03-29 RX ADMIN — IOPAMIDOL 115 ML: 755 INJECTION, SOLUTION INTRAVENOUS at 16:39

## 2025-03-29 NOTE — CONSULTS
"Stroke Consult Note    Patient Name: Rehana Hamilton   MRN: [id number]  Age: 54 y.o.  Sex: female  : 1970    Primary Care Physician: Kimberly Hughes MD  Referring Physician:  Dr. Coe    TIME STROKE TEAM CALLED: 1545 EST     TIME PATIENT SEEN: 1623 EST    Handedness: Right  Race:      Chief Complaint/Reason for Consultation: Slurred speech, right facial droop, worsening left sided weakness    HPI: Rehana Hamilton is a 54-year-old, , right-handed female with known diagnosis of prior CVA (bilateral, ), history of PE (on Eliquis), HTN, T2DM, CHF, COPD, RA, SORAYA, history of colorectal cancer s/p colectomy (), and hypothyroidism who presents today with headache, dysarthria, and worsening left-sided weakness.  Patient's spouse evidently noticed the patient becoming weaker throughout the day with slurred speech that began at approximately 14:30 and EMS was called.  Per documentation review it appears the patient was seen at [facility] in 2024 with AMS and dysarthria (was already on Eliquis for PEs at that time) and then transferred to [facility] where she had an MRI that showed \"… Multiple foci of acute infarct above the tentorium bilaterally.  There is involvement in the right posterior watershed territory and left lateral lobe.  Multiple other small foci are noted.\"  Patient is a poor historian at this time and family is not available, unclear on what her residual neurodeficits were following her stroke.    On arrival to [facility] patient is lethargic, she does open her eyes briefly and responds to some questions with encouragement.  She is able to tell me her name and age but not the month or location.  Her speech is dysarthric, she has very poor dentition.  She does have some loss of fluency as well.  Her face appears symmetrical to me, there was report of drooling out of the right side of her mouth however she was slumped over on the gurney all the way to the " "right.  She is globally weak; + LUE drift, she has no antigravity strength of her left leg.  Reports decreased sensation to light touch on her left side.  She is able to confirm that she did take her Eliquis this morning.    Last Known Normal Date/Time: 14:30 EST     Review of Systems   Eyes:  Positive for visual disturbance.   Respiratory:  Negative for shortness of breath.    Cardiovascular:  Negative for chest pain.   Gastrointestinal:  Positive for nausea.   Neurological:  Positive for speech difficulty, weakness, numbness and headaches.      Past Medical History:   Diagnosis Date    Allergic rhinitis     Cancer of transverse colon     Personal history of colon cancer    Colon cancer 10/11/2016    Congestive heart failure     COPD (chronic obstructive pulmonary disease)     Depression     Disease of thyroid gland     Esophagitis     GERD (gastroesophageal reflux disease)     History of degenerative disc disease     History of migraine     History of nuclear stress test 2017    \"IT WAS NORMAL\"    History of sleep apnea     NO CPAP    Hypertension     Personal history of congestive heart failure     PONV (postoperative nausea and vomiting)     Rheumatoid arthritis     Tattoo     X5    Type 2 diabetes mellitus      Past Surgical History:   Procedure Laterality Date    CHOLECYSTECTOMY      COLON RESECTION  05/01/2014    SECONDARY TO COLON CANCER    COLONOSCOPY N/A 10/2/2017    Procedure: DIAGNOSTIC COLONOSCOPY;  Surgeon: Elina Summers MD;  Location: [facility] ENDOSCOPY;  Service:     COLONOSCOPY N/A 12/7/2018    Procedure: COLONOSCOPY;  Surgeon: Elina Summers MD;  Location: [facility] ENDOSCOPY;  Service: Gastroenterology    COLONOSCOPY N/A 9/11/2020    Procedure: COLONOSCOPY W/ COLD SNARE POLYPECTOMY;  Surgeon: Elina Summers MD;  Location: [facility] ENDOSCOPY;  Service: Gastroenterology;  Laterality: N/A;    COLONOSCOPY W/ BIOPSIES      COLONOSCOPY W/ POLYPECTOMY      DILATION AND CURETTAGE, DIAGNOSTIC " / THERAPEUTIC      ENDOSCOPY N/A 10/2/2017    Procedure: ESOPHAGOGASTRODUODENOSCOPY WITH BIOPSY;  Surgeon: Elina Summers MD;  Location: [facility] ENDOSCOPY;  Service:     HYSTERECTOMY      Total Abdominal Hysterectomy with Removal of Both Ovaries    TUBAL ABDOMINAL LIGATION      UPPER GASTROINTESTINAL ENDOSCOPY      Diagnostic     Family History   Problem Relation Age of Onset    Prostate cancer Father     Osteoporosis Mother     Diabetes Sister      Social History     Socioeconomic History    Marital status:    Tobacco Use    Smoking status: Never    Smokeless tobacco: Never   Substance and Sexual Activity    Alcohol use: No    Drug use: No    Sexual activity: Defer     Allergies   Allergen Reactions    Ciprofloxacin Hives     Prior to Admission medications    Medication Sig Start Date End Date Taking? Authorizing Provider   carvedilol (COREG) 6.25 MG tablet Take 6.25 mg by mouth 2 (Two) Times a Day With Meals. 9/13/17   Damon Brooks MD   cetirizine (zyrTEC) 10 MG tablet Take 10 mg by mouth Daily. 9/19/17   Damon Brooks MD   FLUoxetine (PROzac) 20 MG capsule Take 20 mg by mouth Daily. 9/19/17   Damon Brooks MD   levothyroxine (SYNTHROID, LEVOTHROID) 75 MCG tablet Take 75 mcg by mouth Daily. 9/19/17   Damon Brooks MD   lisinopril (PRINIVIL,ZESTRIL) 20 MG tablet Take 40 mg by mouth Daily. 9/13/17   Damon Brooks MD   metFORMIN (GLUCOPHAGE) 500 MG tablet Take 500 mg by mouth 2 (Two) Times a Day With Meals. 9/13/17   Damon Brooks MD   pantoprazole (PROTONIX) 40 MG EC tablet TAKE ONE (1) TABLET BY MOUTH DAILY. 5/19/22   Elina Summers MD   pravastatin (PRAVACHOL) 40 MG tablet Take 40 mg by mouth Daily. 9/11/17   Damon Brooks MD   Prenatal Vit-Fe Fumarate-FA (PRENATAL 27-1) 27-1 MG tablet tablet Take 1 tablet by mouth Daily. 9/19/17   Damon Brooks MD   SM 8 HOUR PAIN RELIEF 650 MG 8 hr tablet Take 650 mg by mouth Every 8 (Eight)  Hours As Needed. 9/19/17   ProviderDamon MD   tiZANidine (ZANAFLEX) 2 MG tablet Take 2 mg by mouth Every 6 (Six) Hours As Needed. 9/19/17   ProviderDamon MD            Neurological Exam  Mental Status  Drowsy. Oriented only to person. Mild dysarthria present. Expressive aphasia present.    Cranial Nerves  CN II: Visual fields full to confrontation.  CN III, IV, VI: Extraocular movements intact bilaterally. Normal lids and orbits bilaterally. Pupils equal round and reactive to light bilaterally.  CN V:  Right: Facial sensation is normal.  Left: Diminished sensation of the entire left side of the face.  CN VII: Full and symmetric facial movement.    Motor  Normal muscle bulk throughout. No fasciculations present. Normal muscle tone. No abnormal involuntary movements. Strength is 5/5 in all four extremities except as noted.  LUE 3/5, RUE 4/5  LLE 2/5, RLE 4/5.    Sensory  Light touch abnormality:   Diminished sensation to light touch of her entire left side.    Coordination    Unable to test.    Gait    Not tested.      Physical Exam  HENT:      Head: Normocephalic and atraumatic.      Mouth/Throat:      Mouth: Mucous membranes are dry.      Dentition: Abnormal dentition. Dental caries present.   Eyes:      General: Lids are normal.      Extraocular Movements: Extraocular movements intact.      Pupils: Pupils are equal, round, and reactive to light.   Pulmonary:      Effort: Pulmonary effort is normal. No respiratory distress.   Musculoskeletal:         General: No swelling.   Skin:     General: Skin is warm and dry.   Neurological:      Mental Status: She is lethargic.      Cranial Nerves: Cranial nerve deficit and dysarthria present.      Sensory: Sensory deficit present.      Motor: Weakness present.   Psychiatric:         Behavior: Behavior normal.         Acute Stroke Data    Thrombolytic Inclusion / Exclusion Criteria    Time: 16:38 EDT  Person Administering Scale: JOSE Hernandez      YES  NO INCLUSION CRITERIA CLASS I   [x] []   Suspected diagnosis of acute ischemic stroke with measureable neurological deficit.  Low NIHSS with disabling stroke symptoms.   [x] []   Onset of stroke symptoms < 3 hours before beginning treatment >/ 18 years old  Stroke symptom onset = time patient was last seen well or without symptoms (LKW)   [] [x]   Onset of symptoms between 3-4.5 hours: >/= 80 years old (safe Class IIa) with history of   both diabetes and prior CVA  (reasonable Class IIb) AND NIHSS </= 25  *If not eligible for IV Thrombolytic consider neuro intervention for LKW within 24 hours     YES NO EXCLUSION CRITERIA (CONTRAINDICATIONS) CLASS III EVIDENCE HARM   [] []   Blood pressure >185/110 medically refractory to IV medications   [] []   Active bleeding at a non-compressible site   [] []   Active intracranial hemorrhage (ICH)   [] []   Symptoms suggestive of subarachnoid hemorrhage (SAH)   [] []   GI bleed within 21 days   [] []   Ischemic stroke within 3 months   [] []   Severe head trauma within 3 months    [] []   Intracranial or intraspinal surgery within 3 months   [] []   Current GI malignancy   [] []   Intracranial neoplasm   [] []   Infective endocarditis   [] []   Aortic arch dissection   [] []   Active coagulopathy with  INR >1.7, platelets <100,000, PTT > 40 sec, PT > 15 sec   *For warfarin, administration can begin before blood tests resulted. Discontinue for above values.    [] []   Treatment dose* of LMWH (Lovenox) in last 24 hours  *prophylactic dosages are not a contraindication   [] []   Concurrent use of antiplatelet agents' glycoprotein inhibitors IIb/IIIa (Integrilin, etc.)   [x] []   Thrombin or factor Xa inhibitors (Eliquis, Xarelto, Arixtra) taken in last 48 hours     YES NO CLASS II: AIS WITH THE FOLLOWING CONDITIONS -   TREATMENT RISKS SHOULD BE WEIGHED AGAINST POSSIBLE BENEFITS.    [] []   Major trauma in last 14 days, recent major surgery in last 14 days, intracranial arterial  dissection, giant unruptured and unsecured intracranial aneurysm, pericarditis     [] []   The risks and benefits have been discussed with the patient or family related to the administration of IV thrombolytic therapy for stroke symptoms.   [] []   I have discussed and reviewed the patient's case and imaging with the attending prior to IV thrombolytic therapy.   TIME  Time IV thrombolytic administered       Hospital Meds:  Scheduled-    Infusions-     PRNs-   sodium chloride    Functional Status Prior to Current Stroke/[place] Score: MRS unknown    NIH Stroke Scale  Time: 16:38 EDT  Person Administering Scale: JOSE Hernandez    1a  Level of consciousness: 1=not alert but arousable by minor stimulation to obey, answer or respond   1b. LOC questions:  1=Answers one question correctly   1c. LOC commands: 0=Performs both tasks correctly   2.  Best Gaze: 0=normal   3.  Visual: 0=No visual loss   4. Facial Palsy: 0=Normal symmetric movement   5a.  Motor left arm: 1=Drift, limb holds 90 (or 45) degrees but drifts down before full 10 seconds: does not hit bed   5b.  Motor right arm: 0=No drift, limb holds 90 (or 45) degrees for full 10 seconds   6a. motor left leg: 3=No effort against gravity, limb falls   6b  Motor right le=Drift, limb holds 90 (or 45) degrees but drifts down before full 10 seconds: does not hit bed   7. Limb Ataxia: 0=Absent   8.  Sensory: 1=Mild to moderate sensory loss; patient feels pinprick is less sharp or is dull on the affected side; there is a loss of superficial pain with pinprick but patient is aware She is being touched   9. Best Language:  1=Mild to moderate aphasia; some obvious loss of fluency or facility of comprehension without significant limitation on ideas expressed or form of expression.   10. Dysarthria: 1=Mild to moderate, patient slurs at least some words and at worst, can be understood with some difficulty   11. Extinction and Inattention: 0=No abnormality    Total:   10        Results Reviewed:  I have personally reviewed current lab, radiology, and data and agree with results.  WBC   Date Value Ref Range Status   03/29/2025 7.75 3.40 - 10.80 10*3/mm3 Final     RBC   Date Value Ref Range Status   03/29/2025 4.72 3.77 - 5.28 10*6/mm3 Final     Hemoglobin   Date Value Ref Range Status   03/29/2025 15.0 12.0 - 17.0 g/dL Final     Comment:     Serial Number: [id number]:  [id number]   03/29/2025 15.0 12.0 - 15.9 g/dL Final     Hematocrit   Date Value Ref Range Status   03/29/2025 44 38 - 51 % Final   03/29/2025 45.9 34.0 - 46.6 % Final     MCV   Date Value Ref Range Status   03/29/2025 97.2 (H) 79.0 - 97.0 fL Final     MCH   Date Value Ref Range Status   03/29/2025 31.8 26.6 - 33.0 pg Final     MCHC   Date Value Ref Range Status   03/29/2025 32.7 31.5 - 35.7 g/dL Final     RDW   Date Value Ref Range Status   03/29/2025 12.3 12.3 - 15.4 % Final     RDW-SD   Date Value Ref Range Status   03/29/2025 44.5 37.0 - 54.0 fl Final     MPV   Date Value Ref Range Status   03/29/2025 9.9 6.0 - 12.0 fL Final     Platelets   Date Value Ref Range Status   03/29/2025 252 140 - 450 10*3/mm3 Final     Neutrophil %   Date Value Ref Range Status   03/29/2025 57.8 42.7 - 76.0 % Final     Lymphocyte %   Date Value Ref Range Status   03/29/2025 31.2 19.6 - 45.3 % Final     Monocyte %   Date Value Ref Range Status   03/29/2025 8.5 5.0 - 12.0 % Final     Eosinophil %   Date Value Ref Range Status   03/29/2025 1.8 0.3 - 6.2 % Final     Basophil %   Date Value Ref Range Status   03/29/2025 0.6 0.0 - 1.5 % Final     Immature Grans %   Date Value Ref Range Status   03/29/2025 0.1 0.0 - 0.5 % Final     Neutrophils, Absolute   Date Value Ref Range Status   03/29/2025 4.47 1.70 - 7.00 10*3/mm3 Final     Lymphocytes, Absolute   Date Value Ref Range Status   03/29/2025 2.42 0.70 - 3.10 10*3/mm3 Final     Monocytes, Absolute   Date Value Ref Range Status   03/29/2025 0.66 0.10 - 0.90 10*3/mm3 Final     Eosinophils,  Absolute   Date Value Ref Range Status   03/29/2025 0.14 0.00 - 0.40 10*3/mm3 Final     Basophils, Absolute   Date Value Ref Range Status   03/29/2025 0.05 0.00 - 0.20 10*3/mm3 Final     Immature Grans, Absolute   Date Value Ref Range Status   03/29/2025 0.01 0.00 - 0.05 10*3/mm3 Final     nRBC   Date Value Ref Range Status   03/29/2025 0.0 0.0 - 0.2 /100 WBC Final     Lab Results   Component Value Date    GLUCOSE 104 (H) 07/24/2016    BUN 12 07/24/2016    CREATININE 1.00 03/29/2025     07/24/2016    K 3.7 07/24/2016     07/24/2016    CALCIUM 9.5 07/24/2016    PROTEINTOT 6.6 04/07/2016    ALBUMIN 3.8 07/23/2016    ALT 9 03/29/2025    AST 15 03/29/2025    ALKPHOS 44 04/07/2016    BILITOT 0.5 04/07/2016    AGRATIO 1.6 08/08/2015    BCR 24.4 (H) 08/08/2015    ANIONGAP 17 07/24/2016    EGFR 67.1 03/29/2025       CT Angiogram Head w AI Analysis of LVO  Result Date: 3/29/2025  1.No hemodynamically significant stenosis, large vessel cut off or aneurysms in the intracranial circulation 2.No hemodynamically significant stenosis, dissection or aneurysms in the extracranial circulation. Electronically Signed: Michael Vázquez MD  3/29/2025 4:58 PM EDT  Workstation ID: [id number]    CT Angiogram Neck  Result Date: 3/29/2025  1.No hemodynamically significant stenosis, large vessel cut off or aneurysms in the intracranial circulation 2.No hemodynamically significant stenosis, dissection or aneurysms in the extracranial circulation. Electronically Signed: Michael Vázquez MD  3/29/2025 4:58 PM EDT  Workstation ID: [id number]    CT CEREBRAL PERFUSION WITH & WITHOUT CONTRAST  Result Date: 3/29/2025   1. No evidence of core infarct nor ischemic brain at risk. Electronically Signed: Michael Vázquez MD  3/29/2025 4:48 PM EDT  Workstation ID: [id number]    CT Head Without Contrast Stroke Protocol  Result Date: 3/29/2025  Impression: No acute intracranial abnormality. Electronically Signed: Joshua Tirado MD  3/29/2025 4:38 PM EDT   Workstation ID: [id number]        Assessment/Plan:  54-year-old, , right-handed female with known diagnosis of prior CVA (bilateral, 2024), history of PE (on Eliquis), HTN, T2DM, CHF, COPD, RA, SORAYA, history of colorectal cancer s/p colectomy (2022), and hypothyroidism who presents today with headache, dysarthria, and worsening left-sided weakness.  Patient's spouse reported progressive weakness throughout the day with slurred speech that began at approximately 1430.  NIH on arrival 10.  CT head negative for acute abnormality.  CTA head and neck negative for flow-limiting stenosis no LVO or aneurysm.  CT perfusion is negative.  Not a candidate for thrombolytics due to Eliquis use    Antiplatelet PTA: aspirin  Anticoagulant PTA: Eliquis        Left sided weakness        Dysarthria       Suspected acute right hemispheric stroke       History of PE - on eliquis       Chronic bilateral strokes  -TIA/CVA order set without thrombolytic therapy has been initiated  -NPO until bedside nursing dysphagia screen completed  -MRI brain wo  -UDS negative  -TTE with bubble  -A1c and lipid panel in AM  -Meds: ASA in the ED, eliquis 5 mg daily to start tomorrow after MRI completed and reviewed  -Activity as tolerated, fall risk precautions  -PT/OT/SLP evaluation    2.  Essential hypertension,   -Allow autoregulation of blood pressure for adequate cerebral blood flow  -Nicardipine as needed for SBP >180    3.  Hyperlipidemia  -Lipid panel in AM  -Atorvastatin 80mg nightly    4.  Diabetes Mellitus type 2,   -A1c in AM  -Maintain euglycemia  -Management per primary team      Plan of care was discussed with Dr. Coe.  Stroke neurology will continue to follow.  Please call with any questions or concerns.  Thank you for this consult.         Farzaneh Riley, APRN  March 29, 2025  16:38 EDT

## 2025-03-29 NOTE — ED NOTES
"Rehana Hamilton    Nursing Report ED to Floor:  Mental status: A&O X3  Ambulatory status: bed bound   Oxygen Therapy:  RA  Cardiac Rhythm: NSR  Admitted from: ED  Safety Concerns:  none   Precautions: none  Social Issues: none  ED Room #:  08    ED Nurse Phone Extension - [phone] or may call [phone].      HPI:   Chief Complaint   Patient presents with    Stroke       Past Medical History:  Past Medical History:   Diagnosis Date    Allergic rhinitis     Cancer of transverse colon     Personal history of colon cancer    Colon cancer 10/11/2016    Congestive heart failure     COPD (chronic obstructive pulmonary disease)     Depression     Disease of thyroid gland     Esophagitis     GERD (gastroesophageal reflux disease)     History of degenerative disc disease     History of migraine     History of nuclear stress test 2017    \"IT WAS NORMAL\"    History of sleep apnea     NO CPAP    Hypertension     Personal history of congestive heart failure     PONV (postoperative nausea and vomiting)     Rheumatoid arthritis     Tattoo     X5    Type 2 diabetes mellitus         Past Surgical History:  Past Surgical History:   Procedure Laterality Date    CHOLECYSTECTOMY      COLON RESECTION  05/01/2014    SECONDARY TO COLON CANCER    COLONOSCOPY N/A 10/2/2017    Procedure: DIAGNOSTIC COLONOSCOPY;  Surgeon: Elina Summers MD;  Location: [facility] ENDOSCOPY;  Service:     COLONOSCOPY N/A 12/7/2018    Procedure: COLONOSCOPY;  Surgeon: Elina Summers MD;  Location: [facility] ENDOSCOPY;  Service: Gastroenterology    COLONOSCOPY N/A 9/11/2020    Procedure: COLONOSCOPY W/ COLD SNARE POLYPECTOMY;  Surgeon: Elina Summers MD;  Location: [facility] ENDOSCOPY;  Service: Gastroenterology;  Laterality: N/A;    COLONOSCOPY W/ BIOPSIES      COLONOSCOPY W/ POLYPECTOMY      DILATION AND CURETTAGE, DIAGNOSTIC / THERAPEUTIC      ENDOSCOPY N/A 10/2/2017    Procedure: ESOPHAGOGASTRODUODENOSCOPY WITH BIOPSY;  Surgeon: Elina Stoner " "MD Kristopher;  Location: [facility] ENDOSCOPY;  Service:     HYSTERECTOMY      Total Abdominal Hysterectomy with Removal of Both Ovaries    TUBAL ABDOMINAL LIGATION      UPPER GASTROINTESTINAL ENDOSCOPY      Diagnostic        Admitting Doctor:   Vince Funes MD    Consulting Provider(s):  Consults       Date and Time Order Name Status Description    3/29/2025  4:23 PM Inpatient Neurology Consult Stroke               Admitting Diagnosis:   The primary encounter diagnosis was Acute left-sided weakness. A diagnosis of Lethargy was also pertinent to this visit.    Most Recent Vitals:   Vitals:    03/29/25 1653   BP: 147/72   BP Location: Left arm   Patient Position: Lying   Pulse: 60   Resp: 18   Temp: 97.7 °F (36.5 °C)   TempSrc: Oral   SpO2: 100%   Weight: 93.4 kg (206 lb)   Height: 162.6 cm (64\")       Active LDAs/IV Access:   Lines, Drains & Airways       Active LDAs       Name Placement date Placement time Site Days    Peripheral IV 03/29/25 1625 Left;Posterior Hand 03/29/25  1625  Hand  less than 1    Peripheral IV 03/29/25 1625 Posterior;Right Hand 03/29/25  1625  Hand  less than 1    Peripheral IV 03/29/25 1634 Right Antecubital 03/29/25  1634  Antecubital  less than 1                    Labs (abnormal labs have a star):   Labs Reviewed   CBC WITH AUTO DIFFERENTIAL - Abnormal; Notable for the following components:       Result Value    MCV 97.2 (*)     All other components within normal limits   URINALYSIS W/ CULTURE IF INDICATED - Abnormal; Notable for the following components:    Specific Gravity, UA 1.035 (*)     Glucose, UA >=1000 mg/dL (3+) (*)     All other components within normal limits    Narrative:     In absence of clinical symptoms, the presence of pyuria, bacteria, and/or nitrites on the urinalysis result does not correlate with infection.  Urine microscopic not indicated.   BLOOD GAS, ARTERIAL W/CO-OXIMETRY - Abnormal; Notable for the following components:    Base Excess, Arterial -1.2 (*)     All " other components within normal limits   POCT CHEM 8 - Abnormal; Notable for the following components:    Sodium 137 (*)     All other components within normal limits   PROTIME-INR - Normal   APTT - Normal    Narrative:     PTT = The equivalent PTT values for the therapeutic range of heparin levels at 0.3 to 0.5 U/ml are 60 to 70 seconds.   AST - Normal   ALT - Normal   LACTIC ACID, PLASMA - Normal   URINE DRUG SCREEN - Normal    Narrative:     Cutoff For Drugs Screened:    Amphetamines               500 ng/ml  Barbiturates               200 ng/ml  Benzodiazepines            150 ng/ml  Cocaine                    150 ng/ml  Methadone                  200 ng/ml  Opiates                    100 ng/ml  Phencyclidine               25 ng/ml  THC                         50 ng/ml  Methamphetamine            500 ng/ml  Tricyclic Antidepressants  300 ng/ml  Oxycodone                  100 ng/ml  Buprenorphine               10 ng/ml    The normal value for all drugs tested is negative. This report includes unconfirmed screening results, with the cutoff values listed, to be used for medical treatment purposes only.  Unconfirmed results must not be used for non-medical purposes such as employment or legal testing.  Clinical consideration should be applied to any drug of abuse test, particularly when unconfirmed results are used.     ETHANOL - Normal    Narrative:     Elevated lactic acid concentration and lactate dehydrogenase(LD) activity may falsely elevate enzymatically determined ethanol levels. Not for legal purposes.    FENTANYL, URINE - Normal    Narrative:     Negative Threshold:      Fentanyl 5 ng/mL     The normal value for the drug tested is negative. This report includes final unconfirmed screening results to be used for medical treatment purposes only. Unconfirmed results must not be used for non-medical purposes such as employment or legal testing. Clinical consideration should be applied to any drug of abuse test,  particularly when unconfirmed results are used.          RAINBOW DRAW    Narrative:     The following orders were created for panel order [place] Draw.  Procedure                               Abnormality         Status                     ---------                               -----------         ------                     Green Top (Gel)[[id number]]                                  Final result               Lavender Top[[id number]]                                     Final result               Gold Top - SST[[id number]]                                   Final result               Parada Top[[id number]]                                         Final result               Light Blue Top[[id number]]                                   Final result                 Please view results for these tests on the individual orders.   BLOOD GAS, ARTERIAL   CBC AND DIFFERENTIAL    Narrative:     The following orders were created for panel order CBC & Differential.  Procedure                               Abnormality         Status                     ---------                               -----------         ------                     CBC Auto Differential[[id number]]        Abnormal            Final result                 Please view results for these tests on the individual orders.   GREEN TOP   LAVENDER TOP   GOLD TOP - SST   GRAY TOP   LIGHT BLUE TOP       Meds Given in ED:   Medications   sodium chloride 0.9 % flush 10 mL (has no administration in time range)   aspirin tablet 325 mg (has no administration in time range)     Or   aspirin suppository 300 mg (has no administration in time range)   acetaminophen (TYLENOL) suppository 650 mg (has no administration in time range)   iopamidol (ISOVUE-370) 76 % injection 150 mL (115 mL Intravenous Given 3/29/25 6219)           Last NIH score:                                                          Dysphagia screening results:  Patient Factors Component (Dysphagia:Stroke or  Rule-out)  Best Eye Response: 4-->(E4) spontaneous (03/29/25 1658)  Best Motor Response: 6-->(M6) obeys commands (03/29/25 1658)  Best Verbal Response: 4-->(V4) confused (03/29/25 1658)  [place] Coma Scale Score: 14 (03/29/25 1658)     [place] Coma Scale:  No data recorded     CIWA:        Restraint Type:            Isolation Status:  No active isolations

## 2025-03-29 NOTE — ED PROVIDER NOTES
"Subjective   History of Present Illness  Mrs. Hamilton is brought by EMS with altered mental status.   advised that she has had gradually worsening weakness and decreasing level of awareness.  He reports that she has left-sided weakness from prior stroke which has been worsening.  No other history available.  Is on Eliquis.  She tells me she does not feel well, on subsequent exam she tells me she cannot feel the left side of her body.      Review of Systems    Past Medical History:   Diagnosis Date    Allergic rhinitis     Cancer of transverse colon     Personal history of colon cancer    Colon cancer 10/11/2016    Congestive heart failure     COPD (chronic obstructive pulmonary disease)     Depression     Disease of thyroid gland     Esophagitis     GERD (gastroesophageal reflux disease)     History of degenerative disc disease     History of migraine     History of nuclear stress test 2017    \"IT WAS NORMAL\"    History of sleep apnea     NO CPAP    Hypertension     Personal history of congestive heart failure     PONV (postoperative nausea and vomiting)     Rheumatoid arthritis     Tattoo     X5    Type 2 diabetes mellitus        Allergies   Allergen Reactions    Ciprofloxacin Hives       Past Surgical History:   Procedure Laterality Date    CHOLECYSTECTOMY      COLON RESECTION  05/01/2014    SECONDARY TO COLON CANCER    COLONOSCOPY N/A 10/2/2017    Procedure: DIAGNOSTIC COLONOSCOPY;  Surgeon: Elina Summers MD;  Location: [facility] ENDOSCOPY;  Service:     COLONOSCOPY N/A 12/7/2018    Procedure: COLONOSCOPY;  Surgeon: Elina Summers MD;  Location: [facility] ENDOSCOPY;  Service: Gastroenterology    COLONOSCOPY N/A 9/11/2020    Procedure: COLONOSCOPY W/ COLD SNARE POLYPECTOMY;  Surgeon: Elina Summers MD;  Location: [facility] ENDOSCOPY;  Service: Gastroenterology;  Laterality: N/A;    COLONOSCOPY W/ BIOPSIES      COLONOSCOPY W/ POLYPECTOMY      DILATION AND CURETTAGE, DIAGNOSTIC / THERAPEUTIC      " ENDOSCOPY N/A 10/2/2017    Procedure: ESOPHAGOGASTRODUODENOSCOPY WITH BIOPSY;  Surgeon: Elina Summers MD;  Location: [facility] ENDOSCOPY;  Service:     HYSTERECTOMY      Total Abdominal Hysterectomy with Removal of Both Ovaries    TUBAL ABDOMINAL LIGATION      UPPER GASTROINTESTINAL ENDOSCOPY      Diagnostic       Family History   Problem Relation Age of Onset    Prostate cancer Father     Osteoporosis Mother     Diabetes Sister        Social History     Socioeconomic History    Marital status:    Tobacco Use    Smoking status: Never    Smokeless tobacco: Never   Vaping Use    Vaping status: Never Used   Substance and Sexual Activity    Alcohol use: No    Drug use: No    Sexual activity: Defer           Objective   Physical Exam  Vitals and nursing note reviewed.   Constitutional:       General: She is not in acute distress.     Appearance: Normal appearance.   HENT:      Head: Normocephalic and atraumatic.      Comments: Very poor dentition, all of her teeth have decayed down to the gumline     Nose: Nose normal. No congestion or rhinorrhea.   Eyes:      General: No scleral icterus.     Conjunctiva/sclera: Conjunctivae normal.   Neck:      Comments: No JVD   Cardiovascular:      Rate and Rhythm: Normal rate and regular rhythm.      Heart sounds: No murmur heard.     No friction rub.   Pulmonary:      Effort: Pulmonary effort is normal.      Breath sounds: Normal breath sounds. No wheezing or rales.   Abdominal:      General: Bowel sounds are normal.      Palpations: Abdomen is soft.      Tenderness: There is no abdominal tenderness. There is no guarding or rebound.   Musculoskeletal:         General: No tenderness.      Cervical back: Normal range of motion and neck supple.      Right lower leg: No edema.      Left lower leg: No edema.   Skin:     General: Skin is warm and dry.      Coloration: Skin is not pale.      Findings: No erythema.   Neurological:      Motor: Weakness present.       Coordination: Coordination normal.      Comments: She is able to move all 4 extremities although does exhibit some mild weakness of the left upper and lower extremities.  Overall she is lethargic and does not answer questions very consistently   Psychiatric:         Mood and Affect: Mood normal.         Behavior: Behavior normal.         Thought Content: Thought content normal.         Critical Care    Performed by: Michael Coe MD  Authorized by: Lidia Dias MD    Critical care provider statement:     Critical care time (minutes):  35    Critical care was necessary to treat or prevent imminent or life-threatening deterioration of the following conditions:  CNS failure or compromise    Critical care was time spent personally by me on the following activities:  Obtaining history from patient or surrogate, examination of patient, ordering and review of laboratory studies, ordering and review of radiographic studies, re-evaluation of patient's condition, review of old charts and discussions with consultants             ED Course  ED Course as of 03/30/25 0020   Sat Mar 29, 2025   1630 Saw her emergently in the CT scan as the result of a code stroke page.  Have examined her.  She is lethargic.  Slightly weaker on the left upper extremity.  Vital signs acceptable.  I reviewed CAT scan at bedside as it was being done and do not see large bleed although awaiting final read from radiology.  Given history of prior stroke we will proceed with stroke workup.  Have conferred with stroke navigator, not candidate for consideration of thrombolysis as is on Eliquis and last known well not clear. [DT]   1647 Conferred with stroke navigator.  She advises that Mrs. Hamilton does have cerebrovascular disease but nothing that looks like large vessel occlusion or anything acute.  Will proceed with stroke workup and eventually MRI. [DT]   1726 She is sleeping, vital signs unremarkable.  Awaiting urine. [DT]   1818 ABG, UDS, urine  negative. [DT]   1823 On exam she remains sleepy but answers questions when I talk to her.  Tells me she has a bad headache.  Will give rectal Tylenol.  Tells me she cannot feel the left side of her body. [DT]      ED Course User Index  [DT] Michael Coe MD                                Total (NIH Stroke Scale): 12              GENEVA reviewed by Lidia Dias MD, Michael Coe MD, Vince Funes MD       Medical Decision Making  Saw her on arrival as reflected above, obtained history from EMS.  Ordered and interpreted multiple labs and CT scans.    Problems Addressed:  Acute left-sided weakness: complicated acute illness or injury that poses a threat to life or bodily functions  Lethargy: complicated acute illness or injury that poses a threat to life or bodily functions    Amount and/or Complexity of Data Reviewed  Labs: ordered.  Radiology: ordered.  ECG/medicine tests: ordered.    Risk  OTC drugs.  Prescription drug management.  Decision regarding hospitalization.    Critical Care  Total time providing critical care: 35 minutes        Final diagnoses:   Acute left-sided weakness   Lethargy       ED Disposition  ED Disposition       ED Disposition   Decision to Admit    Condition   --    Comment   Level of Care: Telemetry [5]   Diagnosis: TIA (transient ischemic attack) [940306]   Is patient appropriate for Inpatient Observation Unit?: No [0]                 No follow-up provider specified.       Medication List      No changes were made to your prescriptions during this visit.            Michael Coe MD  03/30/25 0020

## 2025-03-29 NOTE — H&P
"    [facility] Medicine Services  HISTORY AND PHYSICAL    Patient Name: Rehana Hamilton  : 1970  MRN: [id number]  Primary Care Physician: Kimberly Hughes MD  Date of admission: 3/29/2025      Subjective   Subjective     Chief Complaint:  L sided weakness    HPI:   54-year-old, , right-handed female with known diagnosis of prior CVA (bilateral, ), history of PE (on Eliquis), HTN, T2DM, CHF, COPD, RA, SORAYA, history of colorectal cancer s/p colectomy (), and hypothyroidism who presents today with headache, dysarthria, and worsening left-sided weakness.  Patient's spouse evidently noticed the patient becoming weaker throughout the day with slurred speech that began at approximately 1430 and EMS was called.  Per documentation review it appears the patient was seen at [facility] in 2024 with AMS and dysarthria (was already on Eliquis for PEs at that time) and then transferred to [facility] where she had an MRI that showed \"… Multiple foci of acute infarct above the tentorium bilaterally.  There is involvement in the right posterior watershed territory and left lateral lobe.  Multiple other small foci are noted.\"    Patient seen with family at the bedside. Patient is able to give medical history. She reports has been on Eliquis for a remote hx of PE over 1 year ago and was told to remain on the medication. She is concerned about a dog bite from her own dog a couple of days ago to her lower leg and buttock. Reports she believes they are up to date on rabies vaccine. Denies N/V/F/C. Reports chronic LUE and LLE weakness due to previous stroke however today she has thought to become worse. +Epigastric pain and relates to it not having her \"stomach\" medicine. She is asking for food.            Personal History     Past Medical History:   Diagnosis Date    Allergic rhinitis     Cancer of transverse colon     Personal history of colon cancer    Colon " "cancer 10/11/2016    Congestive heart failure     COPD (chronic obstructive pulmonary disease)     Depression     Disease of thyroid gland     Esophagitis     GERD (gastroesophageal reflux disease)     History of degenerative disc disease     History of migraine     History of nuclear stress test 2017    \"IT WAS NORMAL\"    History of sleep apnea     NO CPAP    Hypertension     Personal history of congestive heart failure     PONV (postoperative nausea and vomiting)     Rheumatoid arthritis     Tattoo     X5    Type 2 diabetes mellitus          Oncology Problem List:  Cancer of transverse colon (10/06/2023; Status: Active)  Colon cancer (10/11/2016; Status: Active)    Oncology/Hematology History    No history exists.     Past Surgical History:   Procedure Laterality Date    CHOLECYSTECTOMY      COLON RESECTION  05/01/2014    SECONDARY TO COLON CANCER    COLONOSCOPY N/A 10/2/2017    Procedure: DIAGNOSTIC COLONOSCOPY;  Surgeon: Elina Summers MD;  Location: [facility] ENDOSCOPY;  Service:     COLONOSCOPY N/A 12/7/2018    Procedure: COLONOSCOPY;  Surgeon: Elina Summers MD;  Location: [facility] ENDOSCOPY;  Service: Gastroenterology    COLONOSCOPY N/A 9/11/2020    Procedure: COLONOSCOPY W/ COLD SNARE POLYPECTOMY;  Surgeon: Elina Summers MD;  Location: [facility] ENDOSCOPY;  Service: Gastroenterology;  Laterality: N/A;    COLONOSCOPY W/ BIOPSIES      COLONOSCOPY W/ POLYPECTOMY      DILATION AND CURETTAGE, DIAGNOSTIC / THERAPEUTIC      ENDOSCOPY N/A 10/2/2017    Procedure: ESOPHAGOGASTRODUODENOSCOPY WITH BIOPSY;  Surgeon: Elina Summers MD;  Location: [facility] ENDOSCOPY;  Service:     HYSTERECTOMY      Total Abdominal Hysterectomy with Removal of Both Ovaries    TUBAL ABDOMINAL LIGATION      UPPER GASTROINTESTINAL ENDOSCOPY      Diagnostic       Family History: family history includes Diabetes in her sister; Osteoporosis in her mother; Prostate cancer in her father.     Social History:  reports that " she has never smoked. She has never used smokeless tobacco. She reports that she does not drink alcohol and does not use drugs.  Social History     Social History Narrative    Not on file       Medications:  Available home medication information reviewed.  FLUoxetine, Prenatal 27-1, acetaminophen, apixaban, atorvastatin, carvedilol, cetirizine, docusate sodium, empagliflozin, fluticasone, levothyroxine, lisinopril, metFORMIN, pantoprazole, pravastatin, sacubitril-valsartan, spironolactone, and tiZANidine    Allergies   Allergen Reactions    Ciprofloxacin Hives       Objective   Objective     Vital Signs:   Temp:  [97.7 °F (36.5 °C)] 97.7 °F (36.5 °C)  Heart Rate:  [46-60] 50  Resp:  [18] 18  BP: (142-166)/(62-83) 166/83  Total (NIH Stroke Scale): 12    Physical Exam   Constitutional:  female no acute distress, awake, generalized weakness however answering questions appropriately mild dysarthria   HENT: NCAT, mucous membranes moist  Respiratory: Clear to auscultation bilaterally, respiratory effort normal   Cardiovascular: S1 and S2, bradycardia, no murmurs, rubs, or gallops  Gastrointestinal: Positive bowel sounds, soft, tender in epigastric region nondistended  Musculoskeletal: Moves 4 extremities, LUE 3/5, LLE 2/5, RUE and RLE 4/5  Psychiatric: Flat affect, cooperative  Neurologic: Oriented x 3, speech clear  Skin: Skin physical exam performed with bedside RN, Jaden Andrews. Cellulitis seen around what appears to be a healing wound on R calf. Multiple scratch marks seen on buttock and R knee.     Result Review:  I have personally reviewed the results from the time of this admission to 3/29/2025 21:52 EDT and agree with these findings:  [x]  Laboratory list / accordion  []  Microbiology  [x]  Radiology  []  EKG/Telemetry   []  Cardiology/Vascular   []  Pathology  []  Old records  []  Other:        LAB RESULTS:      Lab 03/29/25  1639 03/29/25  1634   WBC  --  7.75   HEMOGLOBIN  --  15.0   HEMOGLOBIN, POC  15.0  --    HEMATOCRIT  --  45.9   HEMATOCRIT POC 44  --    PLATELETS  --  252   NEUTROS ABS  --  4.47   IMMATURE GRANS (ABS)  --  0.01   LYMPHS ABS  --  2.42   MONOS ABS  --  0.66   EOS ABS  --  0.14   MCV  --  97.2*   LACTATE  --  1.5   PROTIME  --  13.4   INR  --  1.01   APTT  --  30.9         Lab 03/29/25  1639   CREATININE 1.00   EGFR 67.1         Lab 03/29/25  1634   ALT (SGPT) 9   AST (SGOT) 15                     Lab 03/29/25  1707   PH, ARTERIAL 7.399   PCO2, ARTERIAL 37.7   PO2 .0   FIO2 21   HCO3 ART 23.3   BASE EXCESS ART -1.2*   CARBOXYHEMOGLOBIN 0.7     UA          3/29/2025    17:47   Urinalysis   Specific [place], UA 1.035    Ketones, UA Negative    Blood, UA Negative    Leukocytes, UA Negative    Nitrite, UA Negative        Microbiology Results (last 10 days)       ** No results found for the last 240 hours. **            XR Chest 1 View  Result Date: 3/29/2025  XR CHEST 1 VW Date of Exam: 3/29/2025 4:53 PM EDT Indication: Acute Stroke Protocol (onset < 12 hrs) Comparison: CT chest from September 2, 2015 Findings: The lungs are clear. The heart and mediastinal contours appear normal. There is no pleural effusion. The pulmonary vasculature appears normal. The osseous structures appear intact.     Impression: Impression: No acute cardiopulmonary process. Electronically Signed: Ha Edgar MD  3/29/2025 5:19 PM EDT  Workstation ID: [id number]    CT Angiogram Head w AI Analysis of LVO  Result Date: 3/29/2025  CT ANGIOGRAM HEAD W AI ANALYSIS OF LVO, CT ANGIOGRAM NECK Date of Exam: 3/29/2025 4:28 PM EDT Indication: Neuro Deficit, acute, Stroke suspected Neuro deficit, acute stroke suspected. Comparison: None available. Technique: CTA of the head and neck was performed after the uneventful intravenous administration of 115 cc Isovue-370. Reconstructed coronal and sagittal images were also obtained. In addition, a 3-D volume rendered image was created for interpretation.  Automated exposure  control and iterative reconstruction methods were used. Findings: CTA NECK: *Aortic arch: No aneurysm. No significant stenosis or occlusion of the major arch vessels. *Left carotid system: No aneurysm, significant stenosis or occlusion. *Right carotid system: No aneurysm, significant stenosis or occlusion. *Vertebrobasilar system: The vertebral arteries arise from their respective subclavian arteries. No aneurysm, significant stenosis or occlusion. CTA HEAD: *Anterior circulation: No aneurysm, significant stenosis or occlusion. *Posterior circulation: No aneurysm, significant stenosis or occlusion. *Anatomic variants: None of significance. *Venous sinuses: Patent.     Impression: 1.No hemodynamically significant stenosis, large vessel cut off or aneurysms in the intracranial circulation 2.No hemodynamically significant stenosis, dissection or aneurysms in the extracranial circulation. Electronically Signed: Michael Vázquez MD  3/29/2025 4:58 PM EDT  Workstation ID: [id number]    CT Angiogram Neck  Result Date: 3/29/2025  CT ANGIOGRAM HEAD W AI ANALYSIS OF LVO, CT ANGIOGRAM NECK Date of Exam: 3/29/2025 4:28 PM EDT Indication: Neuro Deficit, acute, Stroke suspected Neuro deficit, acute stroke suspected. Comparison: None available. Technique: CTA of the head and neck was performed after the uneventful intravenous administration of 115 cc Isovue-370. Reconstructed coronal and sagittal images were also obtained. In addition, a 3-D volume rendered image was created for interpretation.  Automated exposure control and iterative reconstruction methods were used. Findings: CTA NECK: *Aortic arch: No aneurysm. No significant stenosis or occlusion of the major arch vessels. *Left carotid system: No aneurysm, significant stenosis or occlusion. *Right carotid system: No aneurysm, significant stenosis or occlusion. *Vertebrobasilar system: The vertebral arteries arise from their respective subclavian arteries. No aneurysm, significant  stenosis or occlusion. CTA HEAD: *Anterior circulation: No aneurysm, significant stenosis or occlusion. *Posterior circulation: No aneurysm, significant stenosis or occlusion. *Anatomic variants: None of significance. *Venous sinuses: Patent.     Impression: 1.No hemodynamically significant stenosis, large vessel cut off or aneurysms in the intracranial circulation 2.No hemodynamically significant stenosis, dissection or aneurysms in the extracranial circulation. Electronically Signed: Michael Vázquez MD  3/29/2025 4:58 PM EDT  Workstation ID: [id number]    CT CEREBRAL PERFUSION WITH & WITHOUT CONTRAST  Result Date: 3/29/2025  CT CEREBRAL PERFUSION W WO CONTRAST Date of Exam: 3/29/2025 4:28 PM EDT Indication: Neuro Deficit, acute, Stroke suspected Neuro deficit, acute stroke suspected.  Comparison: None available. Technique: Axial CT images of the brain were obtained prior to and after the administration of 115 cc Isovue-370. Core blood volume, core blood flow, mean transit time, and Tmax images were obtained utilizing the Rapid software protocol. A limited CT angiogram of the head was also performed to measure the blood vessel density. The radiation dose reduction device was turned on for each scan per the ALARA (As Low as Reasonably Achievable) protocol. Findings:   Cerebral blood flow, blood volume, and mean transit time maps are symmetric without large perfusion defect.  CBF<30% volume: 0mL Tmax>6sec volume: 0 mL Mismatch volume: 0mL Mismatch ratio: None        Impression:  1. No evidence of core infarct nor ischemic brain at risk. Electronically Signed: Michael Vázquez MD  3/29/2025 4:48 PM EDT  Workstation ID: [id number]    CT Head Without Contrast Stroke Protocol  Result Date: 3/29/2025  CT HEAD WO CONTRAST STROKE PROTOCOL Date of Exam: 3/29/2025 4:25 PM EDT Indication: Neuro deficit, acute, stroke suspected Neuro Deficit, acute, Stroke suspected. Comparison: None available. Technique: Axial CT images were obtained  of the head without contrast administration.  Reconstructed coronal images were also obtained. Automated exposure control and iterative construction methods were used. Scan Time: 4:24 p.m. Results discussed with stroke team at 4:31 p.m. Findings: Gray-white differentiation is maintained and there is no evidence of intracranial hemorrhage, mass or mass effect. The ventricles are normal in size and configuration. The orbits are normal. The paranasal sinuses appear clear. The calvarium is intact.     Impression: Impression: No acute intracranial abnormality. Electronically Signed: Joshua Tirado MD  3/29/2025 4:38 PM EDT  Workstation ID: [id number]          Assessment & Plan   Assessment & Plan     54-year-old, , right-handed female with known diagnosis of prior CVA (bilateral, 2024), history of PE (on Eliquis), HTN, T2DM, CHF, COPD, RA, SORAYA, history of colorectal cancer s/p colectomy (2022), and hypothyroidism who presents today with headache, dysarthria, and worsening left-sided weakness.  Patient's spouse evidently noticed the patient becoming weaker throughout the day with slurred speech that began at approximately 1430.     Left sided weakness  Dysarthria  Suspected acute right hemispheric stroke  History of PE - on eliquis  Chronic bilateral strokes  -TIA/CVA order set without thrombolytic therapy has been initiated  -NPO until bedside nursing dysphagia screen completed  -MRI brain wo  -UDS negative  -TTE with bubble  -A1c and lipid panel in AM  -Meds: ASA in the ED, eliquis 5 mg daily to start tomorrow after MRI completed and reviewed  -Activity as tolerated, fall risk precautions     Domestic dog bite, cellulitis of posterior R lower leg  -patient reports bit by her own dog a few days ago. She believes her dogs are up to date on rabies vaccine. With bedside RN present, Jaden Anrdews, discussed risk/benefits of rabies vaccine/immunoglobulin. Patient expresses understanding of the seriousness possible life  threatening disease of rabies however declines intervention.   -Will order Unasyn for now until passes swallow study then can transition to Augmentin   -ordered tetanus shot     GERD  -continue with PPI, added Carafate    Essential hypertension  -Allow autoregulation of blood pressure for adequate cerebral blood flow  -Nicardipine as needed for SBP >220  -hold home Entresto 24-26 twice daily, spironolactone 25 daily, carvedilol 25 mg twice daily     Hyperlipidemia  -Lipid panel in AM  -Atorvastatin 80mg nightly    Hypothyroidism  -continue home medications      Diabetes Mellitus type 2  -A1c in AM  -Maintain euglycemia  -ISS, hold Jardiance     Mood disorder  -continue with Fluoxetine 60 mg QD      VTE Prophylaxis:  Pharmacologic & mechanical VTE prophylaxis orders are present.          CODE STATUS:    Code Status and Medical Interventions: CPR (Attempt to Resuscitate); Full Support   Ordered at: 03/29/25 2009     Code Status (Patient has no pulse and is not breathing):    CPR (Attempt to Resuscitate)     Medical Interventions (Patient has pulse or is breathing):    Full Support     Level Of Support Discussed With:    Patient       Expected Discharge   Expected discharge date/ time has not been documented.     Lidia Dias MD  03/29/25

## 2025-03-30 ENCOUNTER — APPOINTMENT (OUTPATIENT)
Dept: CARDIOLOGY | Facility: HOSPITAL | Age: 55
End: 2025-03-30
Payer: COMMERCIAL

## 2025-03-30 LAB
ANION GAP SERPL CALCULATED.3IONS-SCNC: 12 MMOL/L (ref 5–15)
AORTIC DIMENSIONLESS INDEX: 0.47 (DI)
ASCENDING AORTA: 3.1 CM
AV MEAN PRESS GRAD SYS DOP V1V2: 8.3 MMHG
AV VMAX SYS DOP: 198 CM/SEC
BH CV ECHO MEAS - AI P1/2T: 507.1 MSEC
BH CV ECHO MEAS - AO MAX PG: 15.9 MMHG
BH CV ECHO MEAS - AO ROOT DIAM: 2.6 CM
BH CV ECHO MEAS - AO V2 VTI: 46.9 CM
BH CV ECHO MEAS - AVA(I,D): 1.47 CM2
BH CV ECHO MEAS - EDV(CUBED): 125 ML
BH CV ECHO MEAS - EDV(MOD-SP2): 49.2 ML
BH CV ECHO MEAS - EDV(MOD-SP4): 79.9 ML
BH CV ECHO MEAS - EF(MOD-SP2): 68.1 %
BH CV ECHO MEAS - EF(MOD-SP4): 60.3 %
BH CV ECHO MEAS - ESV(CUBED): 27 ML
BH CV ECHO MEAS - ESV(MOD-SP2): 15.7 ML
BH CV ECHO MEAS - ESV(MOD-SP4): 31.7 ML
BH CV ECHO MEAS - FS: 40 %
BH CV ECHO MEAS - IVS/LVPW: 0.91 CM
BH CV ECHO MEAS - IVSD: 1 CM
BH CV ECHO MEAS - LA DIMENSION: 3.9 CM
BH CV ECHO MEAS - LAT PEAK E' VEL: 6.4 CM/SEC
BH CV ECHO MEAS - LV DIASTOLIC VOL/BSA (35-75): 40.3 CM2
BH CV ECHO MEAS - LV MASS(C)D: 194.4 GRAMS
BH CV ECHO MEAS - LV MAX PG: 3.4 MMHG
BH CV ECHO MEAS - LV MEAN PG: 2 MMHG
BH CV ECHO MEAS - LV SYSTOLIC VOL/BSA (12-30): 16 CM2
BH CV ECHO MEAS - LV V1 MAX: 92.8 CM/SEC
BH CV ECHO MEAS - LV V1 VTI: 21.9 CM
BH CV ECHO MEAS - LVIDD: 5 CM
BH CV ECHO MEAS - LVIDS: 3 CM
BH CV ECHO MEAS - LVOT AREA: 3.1 CM2
BH CV ECHO MEAS - LVOT DIAM: 2 CM
BH CV ECHO MEAS - LVPWD: 1.1 CM
BH CV ECHO MEAS - MED PEAK E' VEL: 6.9 CM/SEC
BH CV ECHO MEAS - MV A MAX VEL: 83.8 CM/SEC
BH CV ECHO MEAS - MV DEC SLOPE: 525 CM/SEC2
BH CV ECHO MEAS - MV DEC TIME: 0.27 SEC
BH CV ECHO MEAS - MV E MAX VEL: 91.7 CM/SEC
BH CV ECHO MEAS - MV E/A: 1.09
BH CV ECHO MEAS - MV MAX PG: 7.4 MMHG
BH CV ECHO MEAS - MV MEAN PG: 2 MMHG
BH CV ECHO MEAS - MV P1/2T: 72.5 MSEC
BH CV ECHO MEAS - MV V2 VTI: 43 CM
BH CV ECHO MEAS - MVA(P1/2T): 3 CM2
BH CV ECHO MEAS - MVA(VTI): 1.6 CM2
BH CV ECHO MEAS - PA ACC TIME: 0.15 SEC
BH CV ECHO MEAS - SV(LVOT): 68.8 ML
BH CV ECHO MEAS - SV(MOD-SP2): 33.5 ML
BH CV ECHO MEAS - SV(MOD-SP4): 48.2 ML
BH CV ECHO MEAS - SVI(LVOT): 34.7 ML/M2
BH CV ECHO MEAS - SVI(MOD-SP2): 16.9 ML/M2
BH CV ECHO MEAS - SVI(MOD-SP4): 24.3 ML/M2
BH CV ECHO MEAS - TAPSE (>1.6): 2.6 CM
BH CV ECHO MEASUREMENTS AVERAGE E/E' RATIO: 13.79
BH CV ECHO SHUNT ASSESSMENT PERFORMED (HIDDEN SCRIPTING): 1
BH CV VAS BP RIGHT ARM: NORMAL MMHG
BH CV XLRA - RV BASE: 2.5 CM
BH CV XLRA - RV LENGTH: 5.2 CM
BH CV XLRA - RV MID: 2.1 CM
BH CV XLRA - TDI S': 16.1 CM/SEC
BUN SERPL-MCNC: 11 MG/DL (ref 6–20)
BUN/CREAT SERPL: 12.8 (ref 7–25)
CALCIUM SPEC-SCNC: 9.1 MG/DL (ref 8.6–10.5)
CHLORIDE SERPL-SCNC: 103 MMOL/L (ref 98–107)
CHOLEST SERPL-MCNC: 126 MG/DL (ref 0–200)
CO2 SERPL-SCNC: 22 MMOL/L (ref 22–29)
CREAT SERPL-MCNC: 0.86 MG/DL (ref 0.57–1)
DEPRECATED RDW RBC AUTO: 43.9 FL (ref 37–54)
EGFRCR SERPLBLD CKD-EPI 2021: 80.4 ML/MIN/1.73
ERYTHROCYTE [DISTWIDTH] IN BLOOD BY AUTOMATED COUNT: 12.4 % (ref 12.3–15.4)
GLUCOSE BLDC GLUCOMTR-MCNC: 104 MG/DL (ref 70–130)
GLUCOSE BLDC GLUCOMTR-MCNC: 105 MG/DL (ref 70–130)
GLUCOSE BLDC GLUCOMTR-MCNC: 106 MG/DL (ref 70–130)
GLUCOSE BLDC GLUCOMTR-MCNC: 128 MG/DL (ref 70–130)
GLUCOSE BLDC GLUCOMTR-MCNC: 185 MG/DL (ref 70–130)
GLUCOSE BLDC GLUCOMTR-MCNC: 85 MG/DL (ref 70–130)
GLUCOSE SERPL-MCNC: 129 MG/DL (ref 65–99)
HBA1C MFR BLD: 6 % (ref 4.8–5.6)
HCT VFR BLD AUTO: 45 % (ref 34–46.6)
HDLC SERPL-MCNC: 41 MG/DL (ref 40–60)
HGB BLD-MCNC: 14.7 G/DL (ref 12–15.9)
LDLC SERPL CALC-MCNC: 72 MG/DL (ref 0–100)
LDLC/HDLC SERPL: 1.79 {RATIO}
LEFT ATRIUM VOLUME INDEX: 22.7 ML/M2
MCH RBC QN AUTO: 31.5 PG (ref 26.6–33)
MCHC RBC AUTO-ENTMCNC: 32.7 G/DL (ref 31.5–35.7)
MCV RBC AUTO: 96.6 FL (ref 79–97)
PLATELET # BLD AUTO: 238 10*3/MM3 (ref 140–450)
PMV BLD AUTO: 9.9 FL (ref 6–12)
POTASSIUM SERPL-SCNC: 4.1 MMOL/L (ref 3.5–5.2)
RBC # BLD AUTO: 4.66 10*6/MM3 (ref 3.77–5.28)
SODIUM SERPL-SCNC: 137 MMOL/L (ref 136–145)
TRIGL SERPL-MCNC: 59 MG/DL (ref 0–150)
VLDLC SERPL-MCNC: 13 MG/DL (ref 5–40)
WBC NRBC COR # BLD AUTO: 10.56 10*3/MM3 (ref 3.4–10.8)

## 2025-03-30 PROCEDURE — 96367 TX/PROPH/DG ADDL SEQ IV INF: CPT

## 2025-03-30 PROCEDURE — 99233 SBSQ HOSP IP/OBS HIGH 50: CPT | Performed by: FAMILY MEDICINE

## 2025-03-30 PROCEDURE — 85027 COMPLETE CBC AUTOMATED: CPT | Performed by: FAMILY MEDICINE

## 2025-03-30 PROCEDURE — 97165 OT EVAL LOW COMPLEX 30 MIN: CPT | Performed by: OCCUPATIONAL THERAPIST

## 2025-03-30 PROCEDURE — 82948 REAGENT STRIP/BLOOD GLUCOSE: CPT

## 2025-03-30 PROCEDURE — 96376 TX/PRO/DX INJ SAME DRUG ADON: CPT

## 2025-03-30 PROCEDURE — G0378 HOSPITAL OBSERVATION PER HR: HCPCS

## 2025-03-30 PROCEDURE — 80048 BASIC METABOLIC PNL TOTAL CA: CPT | Performed by: FAMILY MEDICINE

## 2025-03-30 PROCEDURE — 96366 THER/PROPH/DIAG IV INF ADDON: CPT

## 2025-03-30 PROCEDURE — 25010000002 DIPHENHYDRAMINE PER 50 MG: Performed by: STUDENT IN AN ORGANIZED HEALTH CARE EDUCATION/TRAINING PROGRAM

## 2025-03-30 PROCEDURE — 83036 HEMOGLOBIN GLYCOSYLATED A1C: CPT | Performed by: NURSE PRACTITIONER

## 2025-03-30 PROCEDURE — 25010000002 PROCHLORPERAZINE 10 MG/2ML SOLUTION: Performed by: STUDENT IN AN ORGANIZED HEALTH CARE EDUCATION/TRAINING PROGRAM

## 2025-03-30 PROCEDURE — 25010000002 AMPICILLIN-SULBACTAM PER 1.5 G: Performed by: FAMILY MEDICINE

## 2025-03-30 PROCEDURE — 25810000003 SODIUM CHLORIDE 0.9 % SOLUTION: Performed by: STUDENT IN AN ORGANIZED HEALTH CARE EDUCATION/TRAINING PROGRAM

## 2025-03-30 PROCEDURE — 25810000003 SODIUM CHLORIDE 0.9 % SOLUTION: Performed by: FAMILY MEDICINE

## 2025-03-30 PROCEDURE — 96375 TX/PRO/DX INJ NEW DRUG ADDON: CPT

## 2025-03-30 PROCEDURE — 97530 THERAPEUTIC ACTIVITIES: CPT | Performed by: OCCUPATIONAL THERAPIST

## 2025-03-30 PROCEDURE — 63710000001 INSULIN REGULAR HUMAN PER 5 UNITS: Performed by: FAMILY MEDICINE

## 2025-03-30 PROCEDURE — 93306 TTE W/DOPPLER COMPLETE: CPT | Performed by: INTERNAL MEDICINE

## 2025-03-30 PROCEDURE — 97530 THERAPEUTIC ACTIVITIES: CPT

## 2025-03-30 PROCEDURE — 99214 OFFICE O/P EST MOD 30 MIN: CPT | Performed by: STUDENT IN AN ORGANIZED HEALTH CARE EDUCATION/TRAINING PROGRAM

## 2025-03-30 PROCEDURE — 80061 LIPID PANEL: CPT | Performed by: NURSE PRACTITIONER

## 2025-03-30 PROCEDURE — 63710000001 PREDNISONE PER 1 MG: Performed by: STUDENT IN AN ORGANIZED HEALTH CARE EDUCATION/TRAINING PROGRAM

## 2025-03-30 PROCEDURE — 93306 TTE W/DOPPLER COMPLETE: CPT

## 2025-03-30 PROCEDURE — 25010000002 MAGNESIUM SULFATE 2 GM/50ML SOLUTION: Performed by: STUDENT IN AN ORGANIZED HEALTH CARE EDUCATION/TRAINING PROGRAM

## 2025-03-30 PROCEDURE — 97162 PT EVAL MOD COMPLEX 30 MIN: CPT

## 2025-03-30 RX ORDER — SODIUM CHLORIDE 9 MG/ML
50 INJECTION, SOLUTION INTRAVENOUS CONTINUOUS
Status: ACTIVE | OUTPATIENT
Start: 2025-03-30 | End: 2025-03-30

## 2025-03-30 RX ORDER — MAGNESIUM SULFATE HEPTAHYDRATE 40 MG/ML
2 INJECTION, SOLUTION INTRAVENOUS DAILY
Status: COMPLETED | OUTPATIENT
Start: 2025-03-30 | End: 2025-03-31

## 2025-03-30 RX ORDER — PREDNISONE 20 MG/1
20 TABLET ORAL DAILY
Status: COMPLETED | OUTPATIENT
Start: 2025-03-30 | End: 2025-03-31

## 2025-03-30 RX ORDER — DEXTROSE MONOHYDRATE AND SODIUM CHLORIDE 5; .9 G/100ML; G/100ML
75 INJECTION, SOLUTION INTRAVENOUS CONTINUOUS
Status: DISCONTINUED | OUTPATIENT
Start: 2025-03-30 | End: 2025-03-30

## 2025-03-30 RX ORDER — DEXTROSE MONOHYDRATE 25 G/50ML
25 INJECTION, SOLUTION INTRAVENOUS
Status: DISCONTINUED | OUTPATIENT
Start: 2025-03-30 | End: 2025-04-04 | Stop reason: HOSPADM

## 2025-03-30 RX ORDER — NICOTINE POLACRILEX 4 MG
15 LOZENGE BUCCAL
Status: DISCONTINUED | OUTPATIENT
Start: 2025-03-30 | End: 2025-04-04 | Stop reason: HOSPADM

## 2025-03-30 RX ORDER — SODIUM CHLORIDE 9 MG/ML
100 INJECTION, SOLUTION INTRAVENOUS CONTINUOUS
Status: ACTIVE | OUTPATIENT
Start: 2025-03-30 | End: 2025-03-30

## 2025-03-30 RX ORDER — DIPHENHYDRAMINE HYDROCHLORIDE 50 MG/ML
12.5 INJECTION, SOLUTION INTRAMUSCULAR; INTRAVENOUS EVERY 6 HOURS
Status: DISCONTINUED | OUTPATIENT
Start: 2025-03-30 | End: 2025-04-01

## 2025-03-30 RX ORDER — PROCHLORPERAZINE EDISYLATE 5 MG/ML
10 INJECTION INTRAMUSCULAR; INTRAVENOUS EVERY 6 HOURS
Status: DISCONTINUED | OUTPATIENT
Start: 2025-03-30 | End: 2025-04-01

## 2025-03-30 RX ORDER — PANTOPRAZOLE SODIUM 40 MG/1
40 TABLET, DELAYED RELEASE ORAL
Status: COMPLETED | OUTPATIENT
Start: 2025-03-31 | End: 2025-04-01

## 2025-03-30 RX ADMIN — PROCHLORPERAZINE EDISYLATE 10 MG: 5 INJECTION INTRAMUSCULAR; INTRAVENOUS at 15:56

## 2025-03-30 RX ADMIN — SUCRALFATE 1 G: 1 TABLET ORAL at 09:35

## 2025-03-30 RX ADMIN — PREDNISONE 20 MG: 20 TABLET ORAL at 15:56

## 2025-03-30 RX ADMIN — SUCRALFATE 1 G: 1 TABLET ORAL at 21:03

## 2025-03-30 RX ADMIN — Medication 10 ML: at 09:35

## 2025-03-30 RX ADMIN — AMOXICILLIN AND CLAVULANATE POTASSIUM 1 TABLET: 875; 125 TABLET, FILM COATED ORAL at 13:12

## 2025-03-30 RX ADMIN — AMOXICILLIN AND CLAVULANATE POTASSIUM 1 TABLET: 875; 125 TABLET, FILM COATED ORAL at 21:03

## 2025-03-30 RX ADMIN — Medication 10 ML: at 21:02

## 2025-03-30 RX ADMIN — DIPHENHYDRAMINE HYDROCHLORIDE 12.5 MG: 50 INJECTION INTRAMUSCULAR; INTRAVENOUS at 21:02

## 2025-03-30 RX ADMIN — VALPROATE SODIUM 500 MG: 100 INJECTION, SOLUTION INTRAVENOUS at 15:55

## 2025-03-30 RX ADMIN — DIPHENHYDRAMINE HYDROCHLORIDE 12.5 MG: 50 INJECTION INTRAMUSCULAR; INTRAVENOUS at 15:56

## 2025-03-30 RX ADMIN — CETIRIZINE HYDROCHLORIDE 10 MG: 10 TABLET, FILM COATED ORAL at 09:35

## 2025-03-30 RX ADMIN — APIXABAN 5 MG: 5 TABLET, FILM COATED ORAL at 23:06

## 2025-03-30 RX ADMIN — PROCHLORPERAZINE EDISYLATE 10 MG: 5 INJECTION INTRAMUSCULAR; INTRAVENOUS at 21:02

## 2025-03-30 RX ADMIN — MAGNESIUM SULFATE HEPTAHYDRATE 2 G: 40 INJECTION, SOLUTION INTRAVENOUS at 15:55

## 2025-03-30 RX ADMIN — AMPICILLIN SODIUM AND SULBACTAM SODIUM 3 G: 2; 1 INJECTION, POWDER, FOR SOLUTION INTRAMUSCULAR; INTRAVENOUS at 03:13

## 2025-03-30 RX ADMIN — SODIUM CHLORIDE 100 ML/HR: 9 INJECTION, SOLUTION INTRAVENOUS at 15:57

## 2025-03-30 RX ADMIN — INSULIN HUMAN 2 UNITS: 100 INJECTION, SOLUTION PARENTERAL at 23:06

## 2025-03-30 RX ADMIN — SUCRALFATE 1 G: 1 TABLET ORAL at 13:12

## 2025-03-30 RX ADMIN — SODIUM CHLORIDE 50 ML/HR: 9 INJECTION, SOLUTION INTRAVENOUS at 03:37

## 2025-03-30 RX ADMIN — AMPICILLIN SODIUM AND SULBACTAM SODIUM 3 G: 2; 1 INJECTION, POWDER, FOR SOLUTION INTRAMUSCULAR; INTRAVENOUS at 09:18

## 2025-03-30 RX ADMIN — APIXABAN 5 MG: 5 TABLET, FILM COATED ORAL at 13:12

## 2025-03-30 RX ADMIN — SUCRALFATE 1 G: 1 TABLET ORAL at 17:10

## 2025-03-30 RX ADMIN — ATORVASTATIN CALCIUM 80 MG: 40 TABLET, FILM COATED ORAL at 21:03

## 2025-03-30 RX ADMIN — FLUOXETINE HYDROCHLORIDE 60 MG: 20 CAPSULE ORAL at 09:35

## 2025-03-30 RX ADMIN — Medication 10 ML: at 15:57

## 2025-03-30 NOTE — THERAPY EVALUATION
"Patient Name: Rehana Hamilton  : 1970    MRN: [id number]                              Today's Date: 3/30/2025       Admit Date: 3/29/2025    Visit Dx:     ICD-10-CM ICD-9-CM   1. Acute left-sided weakness  R53.1 728.87   2. Lethargy  R53.83 780.79     Patient Active Problem List   Diagnosis    Allergic rhinitis    GERD (gastroesophageal reflux disease)    Hypertension    Migraine    Sleep apnea    Rheumatoid arthritis    Type 2 diabetes mellitus    Congestive heart failure    Chronic obstructive lung disease    Colon cancer    Hypothyroidism    Encounter for colonoscopy due to history of colon cancer    Gastroesophageal reflux disease    Personal history of colon cancer    Abdominal pain    Acute pulmonary edema    Blood in feces    Chronic nausea    Esophagitis, reflux    Medically noncompliant    Pedal edema    SOB (shortness of breath)    Pulmonary embolism with infarction    Cancer of transverse colon    Hypertension    TIA (transient ischemic attack)     Past Medical History:   Diagnosis Date    Allergic rhinitis     Cancer of transverse colon     Personal history of colon cancer    Colon cancer 10/11/2016    Congestive heart failure     COPD (chronic obstructive pulmonary disease)     Depression     Disease of thyroid gland     Esophagitis     GERD (gastroesophageal reflux disease)     History of degenerative disc disease     History of migraine     History of nuclear stress test     \"IT WAS NORMAL\"    History of sleep apnea     NO CPAP    Hypertension     Personal history of congestive heart failure     PONV (postoperative nausea and vomiting)     Rheumatoid arthritis     Tattoo     X5    Type 2 diabetes mellitus      Past Surgical History:   Procedure Laterality Date    CHOLECYSTECTOMY      COLON RESECTION  2014    SECONDARY TO COLON CANCER    COLONOSCOPY N/A 10/2/2017    Procedure: DIAGNOSTIC COLONOSCOPY;  Surgeon: Elina Summers MD;  Location: [facility] ENDOSCOPY;  Service:  "    COLONOSCOPY N/A 12/7/2018    Procedure: COLONOSCOPY;  Surgeon: Elina Summers MD;  Location: [facility] ENDOSCOPY;  Service: Gastroenterology    COLONOSCOPY N/A 9/11/2020    Procedure: COLONOSCOPY W/ COLD SNARE POLYPECTOMY;  Surgeon: Elina Summers MD;  Location: [facility] ENDOSCOPY;  Service: Gastroenterology;  Laterality: N/A;    COLONOSCOPY W/ BIOPSIES      COLONOSCOPY W/ POLYPECTOMY      DILATION AND CURETTAGE, DIAGNOSTIC / THERAPEUTIC      ENDOSCOPY N/A 10/2/2017    Procedure: ESOPHAGOGASTRODUODENOSCOPY WITH BIOPSY;  Surgeon: Elina Summers MD;  Location: [facility] ENDOSCOPY;  Service:     HYSTERECTOMY      Total Abdominal Hysterectomy with Removal of Both Ovaries    TUBAL ABDOMINAL LIGATION      UPPER GASTROINTESTINAL ENDOSCOPY      Diagnostic      General Information       Row Name 03/30/25 1129          OT Time and Intention    Document Type evaluation  -AR     Mode of Treatment occupational therapy;co-treatment  -AR       Row Name 03/30/25 1129          General Information    Patient Profile Reviewed yes  -AR     Prior Level of Function independent:;all household mobility;gait;transfer;ADL's  uses rollator, sleeps in recliner  -AR     Existing Precautions/Restrictions fall  L sided hemiparesis  -AR     Barriers to Rehab visual deficit;medically complex;previous functional deficit  -AR       Row Name 03/30/25 1129          Living Environment    Current Living Arrangements home  -AR     People in Home spouse  -AR       Row Name 03/30/25 1129          Home Main Entrance    Number of Stairs, Main Entrance two  -AR     Stair Railings, Main Entrance railing on right side (ascending)  -AR       Row Name 03/30/25 1129          Stairs Within Home, Primary    Number of Stairs, Within Home, Primary twelve  -AR     Stair Railings, Within Home, Primary railing on right side (ascending)  -AR       Row Name 03/30/25 1129          Cognition    Orientation Status (Cognition) oriented x 3;other (see comments)   -AR       Row Name 03/30/25 1129          Safety Issues/Impairments Affecting Functional Mobility    Safety Issues Affecting Function (Mobility) awareness of need for assistance;insight into deficits/self-awareness;judgment;problem-solving;safety precaution awareness;safety precautions follow-through/compliance;sequencing abilities  -AR     Impairments Affecting Function (Mobility) balance;coordination;endurance/activity tolerance;motor planning;motor control;muscle tone abnormal;postural/trunk control;sensation/sensory awareness;strength;visual/perceptual;grasp;range of motion (ROM)  -AR               User Key  (r) = Recorded By, (t) = Taken By, (c) = Cosigned By      Initials Name Provider Type    Charissa Garcia OT Occupational Therapist                     Mobility/ADL's       Row Name 03/30/25 1131          Bed Mobility    Bed Mobility scooting/bridging;supine-sit  -AR     Scooting/Bridging [place] (Bed Mobility) moderate assist (50% patient effort);2 person assist;verbal cues;nonverbal cues (demo/gesture)  -AR     Supine-Sit [place] (Bed Mobility) moderate assist (50% patient effort);2 person assist;verbal cues;nonverbal cues (demo/gesture)  -AR     Assistive Device (Bed Mobility) bed rails;head of bed elevated;repositioning sheet  -AR     Comment, (Bed Mobility) Increased assist to advance LLE  -AR       Row Name 03/30/25 1131          Transfers    Transfers sit-stand transfer;stand-sit transfer;bed-chair transfer  -AR     Comment, (Transfers) Cues for hand placement and chair approach, mild L knee buckling noted  -AR       Row Name 03/30/25 1131          Bed-Chair Transfer    Bed-Chair [place] (Transfers) moderate assist (50% patient effort);2 person assist;verbal cues  -AR     Assistive Device (Bed-Chair Transfers) other (see comments)  BUE HHA  -AR       Row Name 03/30/25 1131          Sit-Stand Transfer    Sit-Stand [place] (Transfers) moderate assist (50% patient effort);2  person assist;verbal cues  -AR     Assistive Device (Sit-Stand Transfers) other (see comments)  -AR       Row Name 03/30/25 1131          Stand-Sit Transfer    Stand-Sit [place] (Transfers) moderate assist (50% patient effort);2 person assist;verbal cues  -AR       Row Name 03/30/25 1131          Activities of Daily Living    BADL Assessment/Intervention upper body dressing;lower body dressing;feeding  -AR       Row Name 03/30/25 1131          Lower Body Dressing Assessment/Training    [place] Level (Lower Body Dressing) don;socks;dependent (less than 25% patient effort)  -AR     Position (Lower Body Dressing) supine  -AR       Row Name 03/30/25 1131          Self-Feeding Assessment/Training    [place] Level (Feeding) liquids to mouth;supervision  -AR     Position (Feeding) supine  -AR               User Key  (r) = Recorded By, (t) = Taken By, (c) = Cosigned By      Initials Name Provider Type    Charissa Garcia, OT Occupational Therapist                   Obj/Interventions       Row Name 03/30/25 1132          Sensory Assessment (Somatosensory)    Sensory Assessment (Somatosensory) left UE  -AR     Sensory Subjective Reports numbness  -AR       Row Name 03/30/25 1132          Vision Assessment/Intervention    Visual Motor Impairment visual tracking, left  -AR     Visual Processing Deficit chirag-inattention/neglect, left  -AR       Row Name 03/30/25 1132          Range of Motion Comprehensive    General Range of Motion upper extremity range of motion deficits identified  -AR     Comment, General Range of Motion BUE AAROM WFL  -AR       Row Name 03/30/25 1132          Strength Comprehensive (MMT)    Comment, General Manual Muscle Testing (MMT) Assessment R shoulder 3-/5, remainder 3/5; L shoulder 2+/5, elbow 3-/5, FA/wrist/hand 3-/5  -AR       Row Name 03/30/25 1132          Motor Skills    Motor Skills coordination  -AR     Coordination left;moderate impairment;fine motor deficit;other (see  comments)  opposition deficit 1 inch  -AR       Row Name 03/30/25 1132          Balance    Balance Assessment sitting static balance;sitting dynamic balance;standing static balance;standing dynamic balance  -AR     Static Sitting Balance contact guard  -AR     Dynamic Sitting Balance contact guard  -AR     Position, Sitting Balance unsupported;sitting edge of bed  -AR     Static Standing Balance moderate assist;1-person assist  -AR     Dynamic Standing Balance moderate assist;2-person assist  -AR     Position/Device Used, Standing Balance supported  -AR               User Key  (r) = Recorded By, (t) = Taken By, (c) = Cosigned By      Initials Name Provider Type    Charissa Garcia, [place] Occupational Therapist                   Goals/Plan       Row Name 03/30/25 1139          Transfer Goal 1 (OT)    Activity/Assistive Device (Transfer Goal 1, OT) sit-to-stand/stand-to-sit;commode, bedside without drop arms;walker, rolling  -AR     [place] Level/Cues Needed (Transfer Goal 1, OT) minimum assist (75% or more patient effort);verbal cues required  -AR     Time Frame (Transfer Goal 1, OT) long term goal (LTG);10 days  -AR     Progress/Outcome (Transfer Goal 1, OT) goal ongoing  -AR       Row Name 03/30/25 1139          Dressing Goal 1 (OT)    Activity/Device (Dressing Goal 1, OT) upper body dressing  -AR     [place]/Cues Needed (Dressing Goal 1, OT) minimum assist (75% or more patient effort);verbal cues required  -AR     Time Frame (Dressing Goal 1, OT) short term goal (STG);5 days  -AR     Progress/Outcome (Dressing Goal 1, OT) goal ongoing  -AR       Row Name 03/30/25 1139          Self-Feeding Goal 1 (OT)    Activity/Device (Self-Feeding Goal 1, OT) self-feeding skills, all  AU PRN  -AR     [place] Level/Cues Needed (Self-Feeding Goal 1, OT) verbal cues required;supervision required  -AR     Time Frame (Self-Feeding Goal 1, OT) short term goal (STG);5 days  -AR     Progress/Outcomes (Self-Feeding  Goal 1, OT) goal ongoing  -AR       Row Name 03/30/25 1139          Problem Specific Goal 1 (OT)    Problem Specific Goal 1 (OT) Pt will locate ADL items placed L of midline during grooming tasks with max 3 vc  -AR     Time Frame (Problem Specific Goal 1, OT) long term goal (LTG);10 days  -AR     Progress/Outcome (Problem Specific Goal 1, OT) goal ongoing  -AR       Row Name 03/30/25 1139          Therapy Assessment/Plan (OT)    Planned Therapy Interventions (OT) activity tolerance training;adaptive equipment training;BADL retraining;functional balance retraining;IADL retraining;neuromuscular control/coordination retraining;occupation/activity based interventions;cognitive/visual perception retraining;patient/caregiver education/training;ROM/therapeutic exercise;strengthening exercise;transfer/mobility retraining  -AR               User Key  (r) = Recorded By, (t) = Taken By, (c) = Cosigned By      Initials Name Provider Type    AR Charissa Crawford, OT Occupational Therapist                   Clinical Impression       Row Name 03/30/25 1134          Pain Assessment    Pain Management Interventions activity modification encouraged;exercise or physical activity utilized;positioning techniques utilized  -AR     Response to Pain Interventions activity participation with tolerable pain  -AR     Additional Documentation Pain Scale: FACES Pre/Post-Treatment (Group)  -AR       Row Name 03/30/25 1134          Pain Scale: FACES Pre/Post-Treatment    Pain: FACES Scale, Pretreatment 2-->hurts little bit  -AR     Posttreatment Pain Rating 2-->hurts little bit  -AR       Row Name 03/30/25 1134          Plan of Care Review    Plan of Care Reviewed With patient  -AR     Outcome Evaluation Pt Ox4 with increased processing time. She completed bed mobility with mod assist x2, LB dressing with dependence and transfer to chair with mod assist x2. She presents with acute-on-chronic L sided hemiparesis, delayed processing, generalized  weakness, impaired balance, visual deficit, bradycardia, hypertension and is performing below baseline. Recommed IPR if medically appropriate.  -AR       Row Name 03/30/25 1134          Therapy Assessment/Plan (OT)    Rehab Potential (OT) good  -AR     Criteria for Skilled Therapeutic Interventions Met (OT) yes  -AR     Therapy Frequency (OT) daily  -AR       Row Name 03/30/25 1134          Therapy Plan Review/Discharge Plan (OT)    Anticipated Discharge Disposition (OT) inpatient rehabilitation facility  -AR       Row Name 03/30/25 1134          Vital Signs    Pre Systolic BP Rehab 188  -AR     Pre Treatment Diastolic BP 73  -AR     Post Systolic BP Rehab 195  -AR     Post Treatment Diastolic BP 81  RN at bedside  -AR     Pretreatment Heart Rate (beats/min) 62  -AR     Intratreatment Heart Rate (beats/min) 48  -AR     Posttreatment Heart Rate (beats/min) 51  -AR     Pre SpO2 (%) 100  -AR     O2 Delivery Pre Treatment room air  -AR     Post SpO2 (%) 99  -AR     O2 Delivery Post Treatment room air  -AR     Pre Patient Position Supine  -AR     Intra Patient Position Standing  -AR     Post Patient Position Sitting  -AR       Row Name 03/30/25 1134          Positioning and Restraints    Pre-Treatment Position in bed  -AR     Post Treatment Position chair  -AR     In Chair reclined;call light within reach;encouraged to call for assist;exit alarm on;with nsg;LUE elevated;waffle cushion;on mechanical lift sling;legs elevated  no SCD pump in room  -AR               User Key  (r) = Recorded By, (t) = Taken By, (c) = Cosigned By      Initials Name Provider Type    Charissa Garcia, [place] Occupational Therapist                   Outcome Measures       Row Name 03/30/25 1141          How much help from another is currently needed...    Putting on and taking off regular lower body clothing? 1  -AR     Bathing (including washing, rinsing, and drying) 2  -AR     Toileting (which includes using toilet bed pan or urinal) 2  -AR      Putting on and taking off regular upper body clothing 2  -AR     Taking care of personal grooming (such as brushing teeth) 3  -AR     Eating meals 3  -AR     AM-PAC 6 Clicks Score (OT) 13  -AR       Row Name 03/30/25 1141          Modified [place] Scale    Pre-Stroke Modified [place] Scale 6 - Unable to determine (UTD) from the medical record documentation  -AR     Modified [place] Scale 4 - Moderately severe disability.  Unable to walk without assistance, and unable to attend to own bodily needs without assistance.  -AR       Row Name 03/30/25 1141          Functional Assessment    Outcome Measure Options AM-PAC 6 Clicks Daily Activity (OT);Modified [place]  -AR               User Key  (r) = Recorded By, (t) = Taken By, (c) = Cosigned By      Initials Name Provider Type    Charissa Garcia OT Occupational Therapist                    Occupational Therapy Education       Title: PT OT SLP Therapies (Done)       Topic: Occupational Therapy (Done)       Point: ADL training (Done)       Learning Progress Summary            Patient Acceptance, E,D, VU by AR at 3/30/2025 1141                      Point: Home exercise program (Done)       Learning Progress Summary            Patient Acceptance, E,D, VU by AR at 3/30/2025 1141                      Point: Precautions (Done)       Learning Progress Summary            Patient Acceptance, E,D, VU by AR at 3/30/2025 1141                      Point: Body mechanics (Done)       Learning Progress Summary            Patient Acceptance, E,D, VU by AR at 3/30/2025 1141                                      User Key       Initials Effective Dates Name Provider Type Discipline    AR 07/11/23 -  Charissa Crawford OT Occupational Therapist OT                  OT Recommendation and Plan  Planned Therapy Interventions (OT): activity tolerance training, adaptive equipment training, BADL retraining, functional balance retraining, IADL retraining, neuromuscular control/coordination  retraining, occupation/activity based interventions, cognitive/visual perception retraining, patient/caregiver education/training, ROM/therapeutic exercise, strengthening exercise, transfer/mobility retraining  Therapy Frequency (OT): daily  Plan of Care Review  Plan of Care Reviewed With: patient  Outcome Evaluation: Pt Ox4 with increased processing time. She completed bed mobility with mod assist x2, LB dressing with dependence and transfer to chair with mod assist x2. She presents with acute-on-chronic L sided hemiparesis, delayed processing, generalized weakness, impaired balance, visual deficit, bradycardia, hypertension and is performing below baseline. Recommed IPR if medically appropriate.     Time Calculation:   Evaluation Complexity (OT)  Review Occupational Profile/Medical/Therapy History Complexity: brief/low complexity  Assessment, Occupational Performance/Identification of Deficit Complexity: 1-3 performance deficits  Clinical Decision Making Complexity (OT): problem focused assessment/low complexity  Overall Complexity of Evaluation (OT): low complexity     Time Calculation- OT       Row Name 03/30/25 1142             Time Calculation- OT    OT Start Time 0916  -AR      OT Received On 03/30/25  -AR      OT Goal Re-Cert Due Date 04/09/25  -AR         Timed Charges    17816 - OT Therapeutic Activity Minutes 10  -AR         Untimed Charges    OT Eval/Re-eval Minutes 60  -AR         Total Minutes    Timed Charges Total Minutes 10  -AR      Untimed Charges Total Minutes 60  -AR       Total Minutes 70  -AR                User Key  (r) = Recorded By, (t) = Taken By, (c) = Cosigned By      Initials Name Provider Type    AR Charissa Crawford OT Occupational Therapist                  Therapy Charges for Today       Code Description Service Date Service Provider Modifiers Qty    [id number]  OT THERAPEUTIC ACT EA 15 MIN 3/30/2025 Charissa Crawford OT GO 1    06064681150 HC OT EVAL LOW COMPLEXITY 4  3/30/2025 Charissa Crawford, OT GO 1                 Charissa Crawford, OT  3/30/2025

## 2025-03-30 NOTE — PLAN OF CARE
Goal Outcome Evaluation:  Plan of Care Reviewed With: patient           Outcome Evaluation: Pt Ox4 with increased processing time. She completed bed mobility with mod assist x2, LB dressing with dependence and transfer to chair with mod assist x2. She presents with acute-on-chronic L sided hemiparesis, delayed processing, generalized weakness, impaired balance, visual deficit, bradycardia, hypertension and is performing below baseline. Recommed IPR if medically appropriate.    Anticipated Discharge Disposition (OT): inpatient rehabilitation facility

## 2025-03-30 NOTE — PROGRESS NOTES
[facility] Medicine Services  PROGRESS NOTE    Patient Name: Rehana Hamilton  : 1970  MRN: [id number]    Date of Admission: 3/29/2025  Primary Care Physician: Kimberly Hughes MD    Subjective   Subjective     CC:  F/U L sided weakness, dysarthria     HPI:  Patient seen and examined. Obtaining Hx difficult as patient goes off on tangents. Says her and her s/o are having financial issues. Their water and power are turned off. Her S/O father just passed away and they are having issues settling his estate. She says their house is cold so she hasn't taken her Eliquis for several days because the medication makes her colder. Says her head hurts when she is trying to answer questions.     Objective   Objective     Vital Signs:   Temp:  [97.7 °F (36.5 °C)-97.8 °F (36.6 °C)] 97.8 °F (36.6 °C)  Heart Rate:  [44-60] 59  Resp:  [16-18] 16  BP: (142-188)/(62-83) 182/75     Physical Exam:  Constitutional: No acute distress, awake, alert, chronically ill appearing/appears older than stated age   HENT: NCAT, mucous membranes dry, +hirsutism   Respiratory: Clear to auscultation bilaterally, respiratory effort normal RA  Cardiovascular: SB, no murmurs, rubs, or gallops  Gastrointestinal: Positive bowel sounds, soft, nontender, non-distended, +obese   Musculoskeletal: No bilateral ankle edema  Psychiatric: Flat affect, cooperative  Neurologic: Oriented x 3, +dysarthria, L sided weakness   Skin: No rashes     Results Reviewed:  LAB RESULTS:      Lab 25  1639 25  1634   WBC  --  7.75   HEMOGLOBIN  --  15.0   HEMOGLOBIN, POC 15.0  --    HEMATOCRIT  --  45.9   HEMATOCRIT POC 44  --    PLATELETS  --  252   NEUTROS ABS  --  4.47   IMMATURE GRANS (ABS)  --  0.01   LYMPHS ABS  --  2.42   MONOS ABS  --  0.66   EOS ABS  --  0.14   MCV  --  97.2*   LACTATE  --  1.5   PROTIME  --  13.4   APTT  --  30.9         Lab 25  1639   CREATININE 1.00   EGFR 67.1         Lab 25  1639    ALT (SGPT) 9   AST (SGOT) 15         Lab 03/29/25  1634   PROTIME 13.4   INR 1.01                 Lab 03/29/25  1707   PH, ARTERIAL 7.399   PCO2, ARTERIAL 37.7   PO2 .0   FIO2 21   HCO3 ART 23.3   BASE EXCESS ART -1.2*   CARBOXYHEMOGLOBIN 0.7     Brief Urine Lab Results  (Last result in the past 365 days)        Color   Clarity   Blood   Leuk Est   Nitrite   Protein   CREAT   Urine HCG        03/29/25 1747 Yellow   Clear   Negative   Negative   Negative   Negative                   Microbiology Results Abnormal       None            MRI Brain Without Contrast  Result Date: 3/30/2025  MRI BRAIN WO CONTRAST Date of Exam: 3/29/2025 11:54 PM EDT Indication: Stroke, follow up Left sided weakness and dysartrhia, history of bilateral stroke.  Comparison: 3/29/2025 Technique:  Routine multiplanar/multisequence sequence images of the brain were obtained without contrast administration. Findings: There are a few scattered foci of increased T2 and FLAIR signal scattered in the subcortical white matter bilaterally consistent with chronic microvascular ischemia. There is no mass, mass effect or midline shift. There are no abnormal extra-axial fluid collections or areas of acute hemorrhage. The major intracranial flow voids are preserved. The diffusion weighted sequences are normal. The paranasal sinuses and mastoid air cells are clear.     Impression: Impression: Mild chronic microvascular ischemia. No acute intracranial process. Electronically Signed: Saeid Del Rio MD  3/30/2025 4:02 AM EDT  Workstation ID: [id number]    XR Chest 1 View  Result Date: 3/29/2025  XR CHEST 1 VW Date of Exam: 3/29/2025 4:53 PM EDT Indication: Acute Stroke Protocol (onset < 12 hrs) Comparison: CT chest from September 2, 2015 Findings: The lungs are clear. The heart and mediastinal contours appear normal. There is no pleural effusion. The pulmonary vasculature appears normal. The osseous structures appear intact.     Impression: Impression: No  acute cardiopulmonary process. Electronically Signed: Ha Edgar MD  3/29/2025 5:19 PM EDT  Workstation ID: [id number]    CT Angiogram Head w AI Analysis of LVO  Result Date: 3/29/2025  CT ANGIOGRAM HEAD W AI ANALYSIS OF LVO, CT ANGIOGRAM NECK Date of Exam: 3/29/2025 4:28 PM EDT Indication: Neuro Deficit, acute, Stroke suspected Neuro deficit, acute stroke suspected. Comparison: None available. Technique: CTA of the head and neck was performed after the uneventful intravenous administration of 115 cc Isovue-370. Reconstructed coronal and sagittal images were also obtained. In addition, a 3-D volume rendered image was created for interpretation.  Automated exposure control and iterative reconstruction methods were used. Findings: CTA NECK: *Aortic arch: No aneurysm. No significant stenosis or occlusion of the major arch vessels. *Left carotid system: No aneurysm, significant stenosis or occlusion. *Right carotid system: No aneurysm, significant stenosis or occlusion. *Vertebrobasilar system: The vertebral arteries arise from their respective subclavian arteries. No aneurysm, significant stenosis or occlusion. CTA HEAD: *Anterior circulation: No aneurysm, significant stenosis or occlusion. *Posterior circulation: No aneurysm, significant stenosis or occlusion. *Anatomic variants: None of significance. *Venous sinuses: Patent.     Impression: 1.No hemodynamically significant stenosis, large vessel cut off or aneurysms in the intracranial circulation 2.No hemodynamically significant stenosis, dissection or aneurysms in the extracranial circulation. Electronically Signed: Michael Vázquez MD  3/29/2025 4:58 PM EDT  Workstation ID: [id number]    CT Angiogram Neck  Result Date: 3/29/2025  CT ANGIOGRAM HEAD W AI ANALYSIS OF LVO, CT ANGIOGRAM NECK Date of Exam: 3/29/2025 4:28 PM EDT Indication: Neuro Deficit, acute, Stroke suspected Neuro deficit, acute stroke suspected. Comparison: None available. Technique: CTA of the head  and neck was performed after the uneventful intravenous administration of 115 cc Isovue-370. Reconstructed coronal and sagittal images were also obtained. In addition, a 3-D volume rendered image was created for interpretation.  Automated exposure control and iterative reconstruction methods were used. Findings: CTA NECK: *Aortic arch: No aneurysm. No significant stenosis or occlusion of the major arch vessels. *Left carotid system: No aneurysm, significant stenosis or occlusion. *Right carotid system: No aneurysm, significant stenosis or occlusion. *Vertebrobasilar system: The vertebral arteries arise from their respective subclavian arteries. No aneurysm, significant stenosis or occlusion. CTA HEAD: *Anterior circulation: No aneurysm, significant stenosis or occlusion. *Posterior circulation: No aneurysm, significant stenosis or occlusion. *Anatomic variants: None of significance. *Venous sinuses: Patent.     Impression: 1.No hemodynamically significant stenosis, large vessel cut off or aneurysms in the intracranial circulation 2.No hemodynamically significant stenosis, dissection or aneurysms in the extracranial circulation. Electronically Signed: Michael Vázquez MD  3/29/2025 4:58 PM EDT  Workstation ID: [id number]    CT CEREBRAL PERFUSION WITH & WITHOUT CONTRAST  Result Date: 3/29/2025  CT CEREBRAL PERFUSION W WO CONTRAST Date of Exam: 3/29/2025 4:28 PM EDT Indication: Neuro Deficit, acute, Stroke suspected Neuro deficit, acute stroke suspected.  Comparison: None available. Technique: Axial CT images of the brain were obtained prior to and after the administration of 115 cc Isovue-370. Core blood volume, core blood flow, mean transit time, and Tmax images were obtained utilizing the Rapid software protocol. A limited CT angiogram of the head was also performed to measure the blood vessel density. The radiation dose reduction device was turned on for each scan per the ALARA (As Low as Reasonably Achievable)  protocol. Findings:   Cerebral blood flow, blood volume, and mean transit time maps are symmetric without large perfusion defect.  CBF<30% volume: 0mL Tmax>6sec volume: 0 mL Mismatch volume: 0mL Mismatch ratio: None        Impression:  1. No evidence of core infarct nor ischemic brain at risk. Electronically Signed: Michael Vázquez MD  3/29/2025 4:48 PM EDT  Workstation ID: [id number]    CT Head Without Contrast Stroke Protocol  Result Date: 3/29/2025  CT HEAD WO CONTRAST STROKE PROTOCOL Date of Exam: 3/29/2025 4:25 PM EDT Indication: Neuro deficit, acute, stroke suspected Neuro Deficit, acute, Stroke suspected. Comparison: None available. Technique: Axial CT images were obtained of the head without contrast administration.  Reconstructed coronal images were also obtained. Automated exposure control and iterative construction methods were used. Scan Time: 4:24 p.m. Results discussed with stroke team at 4:31 p.m. Findings: Gray-white differentiation is maintained and there is no evidence of intracranial hemorrhage, mass or mass effect. The ventricles are normal in size and configuration. The orbits are normal. The paranasal sinuses appear clear. The calvarium is intact.     Impression: Impression: No acute intracranial abnormality. Electronically Signed: Joshua Tirado MD  3/29/2025 4:38 PM EDT  Workstation ID: [id number]          Current medications:  Scheduled Meds:amoxicillin-clavulanate, 1 tablet, Oral, Q12H  atorvastatin, 80 mg, Oral, Nightly  cetirizine, 10 mg, Oral, Daily  FLUoxetine, 60 mg, Oral, Daily  insulin regular, 2-7 Units, Subcutaneous, 4x Daily AC & at Bedtime  levothyroxine, 75 mcg, Oral, Q AM  sucralfate, 1 g, Oral, 4x Daily AC & at Bedtime      Continuous Infusions:   PRN Meds:.  acetaminophen **OR** acetaminophen **OR** acetaminophen    senna-docusate sodium **AND** polyethylene glycol **AND** bisacodyl **AND** bisacodyl    calcium carbonate    Calcium Replacement - Follow Nurse / BPA Driven Protocol     dextrose    dextrose    glucagon (human recombinant)    Magnesium Standard Dose Replacement - Follow Nurse / BPA Driven Protocol    nitroglycerin    Phosphorus Replacement - Follow Nurse / BPA Driven Protocol    Potassium Replacement - Follow Nurse / BPA Driven Protocol    sodium chloride    sodium chloride    Assessment & Plan   Assessment & Plan     Active Hospital Problems    Diagnosis  POA    **TIA (transient ischemic attack) [G45.9]  Yes      Resolved Hospital Problems   No resolved problems to display.        Brief Hospital Course to date:  Rehana Hamilton is a 54 y.o. female with known diagnosis of prior CVA (bilateral, 2024), history of PE (on Eliquis), HTN, T2DM, CHF, COPD, RA, SORAYA, history of colorectal cancer s/p colectomy (2022), and hypothyroidism who presents with headache, dysarthria, and worsening left-sided weakness.  Patient's spouse evidently noticed the patient becoming weaker throughout the day with slurred speech that began at approximately 1430 3/29.    This patient's problems and plans were partially entered by my partner and updated as appropriate by me 03/30/25.   All problems are new to me toay     Left sided weakness  Dysarthria  History of PE - on eliquis  Chronic bilateral strokes  -Stroke Neurology follows  -MRI brain without acute findings  -TTE with bubble pending  -Non-compliant with Eliquis for the past few weeks   -PT/OT recommend rehab  -SLP to see      Domestic dog bite, cellulitis of posterior R lower leg  -patient reports being bit by her own dog a few days ago. She believes her dogs are up to date on rabies vaccine. Practice partner discussed risk/benefits of rabies vaccine/immunoglobulin. Patient expressed understanding of the seriousness possible life threatening disease of rabies however declines intervention.   -DC Unasyn, changed to Augmentin  -Received tetanus shot      GERD  -continue with PPI, Carafate     Essential hypertension  -Allow autoregulation of blood  pressure for adequate cerebral blood flow  -Nicardipine as needed for SBP >220  -hold home Entresto 24-26 twice daily, spironolactone 25 daily, carvedilol 25 mg twice daily (of note, patient has been bradycardic since admission)  -Hold meds until cleared by Neurology to resume. Would not resume Coreg with current HR.     Hyperlipidemia  -Lipid panel pending  -Atorvastatin 80mg nightly     Hypothyroidism  -continue home medications      Diabetes Mellitus type 2  -A1c pending  -Maintain euglycemia  -SSI, hold Jardiance     Mood disorder  -continue with Fluoxetine 60 mg QD    Social issues  -has had water and electric turned off  -SW to see     Expected Discharge Location and Transportation: therapy recommends rehab  Expected Discharge   Expected Discharge Date: 3/31/2025; Expected Discharge Time:      VTE Prophylaxis:  Pharmacologic & mechanical VTE prophylaxis orders are present.         AM-PAC 6 Clicks Score (PT): 11 (03/30/25 1143)    CODE STATUS:   Code Status and Medical Interventions: CPR (Attempt to Resuscitate); Full Support   Ordered at: 03/29/25 2009     Code Status (Patient has no pulse and is not breathing):    CPR (Attempt to Resuscitate)     Medical Interventions (Patient has pulse or is breathing):    Full Support     Level Of Support Discussed With:    Patient       Stacie D DO Ghassan  03/30/25

## 2025-03-30 NOTE — PLAN OF CARE
Goal Outcome Evaluation:         Patient alert and oriented x4. Slow with speech,  states that is her baseline. NIH 6. Able to use BSC for voiding x2 assist. Migraine cocktail given this shift. Patient states it has helped some.

## 2025-03-30 NOTE — THERAPY EVALUATION
"Patient Name: Rehana Hamilton  : 1970    MRN: [id number]                              Today's Date: 3/30/2025       Admit Date: 3/29/2025    Visit Dx:     ICD-10-CM ICD-9-CM   1. Acute left-sided weakness  R53.1 728.87   2. Lethargy  R53.83 780.79     Patient Active Problem List   Diagnosis    Allergic rhinitis    GERD (gastroesophageal reflux disease)    Hypertension    Migraine    Sleep apnea    Rheumatoid arthritis    Type 2 diabetes mellitus    Congestive heart failure    Chronic obstructive lung disease    Colon cancer    Hypothyroidism    Encounter for colonoscopy due to history of colon cancer    Gastroesophageal reflux disease    Personal history of colon cancer    Abdominal pain    Acute pulmonary edema    Blood in feces    Chronic nausea    Esophagitis, reflux    Medically noncompliant    Pedal edema    SOB (shortness of breath)    Pulmonary embolism with infarction    Cancer of transverse colon    Hypertension    TIA (transient ischemic attack)     Past Medical History:   Diagnosis Date    Allergic rhinitis     Cancer of transverse colon     Personal history of colon cancer    Colon cancer 10/11/2016    Congestive heart failure     COPD (chronic obstructive pulmonary disease)     Depression     Disease of thyroid gland     Esophagitis     GERD (gastroesophageal reflux disease)     History of degenerative disc disease     History of migraine     History of nuclear stress test     \"IT WAS NORMAL\"    History of sleep apnea     NO CPAP    Hypertension     Personal history of congestive heart failure     PONV (postoperative nausea and vomiting)     Rheumatoid arthritis     Tattoo     X5    Type 2 diabetes mellitus      Past Surgical History:   Procedure Laterality Date    CHOLECYSTECTOMY      COLON RESECTION  2014    SECONDARY TO COLON CANCER    COLONOSCOPY N/A 10/2/2017    Procedure: DIAGNOSTIC COLONOSCOPY;  Surgeon: Elina Summers MD;  Location: [facility] ENDOSCOPY;  Service:  "    COLONOSCOPY N/A 12/7/2018    Procedure: COLONOSCOPY;  Surgeon: Elina Summers MD;  Location: [facility] ENDOSCOPY;  Service: Gastroenterology    COLONOSCOPY N/A 9/11/2020    Procedure: COLONOSCOPY W/ COLD SNARE POLYPECTOMY;  Surgeon: Elina Summers MD;  Location: [facility] ENDOSCOPY;  Service: Gastroenterology;  Laterality: N/A;    COLONOSCOPY W/ BIOPSIES      COLONOSCOPY W/ POLYPECTOMY      DILATION AND CURETTAGE, DIAGNOSTIC / THERAPEUTIC      ENDOSCOPY N/A 10/2/2017    Procedure: ESOPHAGOGASTRODUODENOSCOPY WITH BIOPSY;  Surgeon: Elina Summers MD;  Location: [facility] ENDOSCOPY;  Service:     HYSTERECTOMY      Total Abdominal Hysterectomy with Removal of Both Ovaries    TUBAL ABDOMINAL LIGATION      UPPER GASTROINTESTINAL ENDOSCOPY      Diagnostic      General Information       Row Name 03/30/25 1010          Physical Therapy Time and Intention    Document Type evaluation  -SS     Mode of Treatment physical therapy  -       Row Name 03/30/25 1010          General Information    Patient Profile Reviewed yes  -SS     Prior Level of Function independent:;all household mobility;gait;transfer;bed mobility  may need clarification; pt. does report she walks around the house with rollator, declines any acute falls  -SS     Existing Precautions/Restrictions fall;other (see comments)  L sided weakness, L visual deficit  -     Barriers to Rehab medically complex;previous functional deficit  -       Row Name 03/30/25 1010          Living Environment    Current Living Arrangements home  -SS     People in Home spouse  -       Row Name 03/30/25 1010          Home Main Entrance    Number of Stairs, Main Entrance two  -SS     Stair Railings, Main Entrance railing on right side (ascending)  -       Row Name 03/30/25 1010          Stairs Within Home, Primary    Number of Stairs, Within Home, Primary twelve  -SS     Stair Railings, Within Home, Primary railing on right side (ascending)  -       Row Name  03/30/25 1010          Cognition    Orientation Status (Cognition) oriented x 3;other (see comments)  increased time to answer  -       Row Name 03/30/25 1010          Safety Issues/Impairments Affecting Functional Mobility    Safety Issues Affecting Function (Mobility) awareness of need for assistance;insight into deficits/self-awareness;judgment;safety precautions follow-through/compliance;problem-solving;safety precaution awareness;sequencing abilities  -     Impairments Affecting Function (Mobility) balance;coordination;endurance/activity tolerance;motor planning;motor control;muscle tone abnormal;postural/trunk control;sensation/sensory awareness;strength;visual/perceptual  -               User Key  (r) = Recorded By, (t) = Taken By, (c) = Cosigned By      Initials Name Provider Type     Estelle Rangel PT Physical Therapist                   Mobility       Row Name 03/30/25 1015          Bed Mobility    Bed Mobility scooting/bridging;supine-sit  -     Scooting/Bridging [place] (Bed Mobility) moderate assist (50% patient effort);2 person assist;verbal cues;nonverbal cues (demo/gesture)  -     Supine-Sit Edgar (Bed Mobility) moderate assist (50% patient effort);2 person assist;verbal cues;nonverbal cues (demo/gesture)  -     Assistive Device (Bed Mobility) bed rails;head of bed elevated;repositioning sheet  -     Comment, (Bed Mobility) V/TC for sequencing  -       Row Name 03/30/25 1015          Bed-Chair Transfer    Bed-Chair Edgar (Transfers) moderate assist (50% patient effort);2 person assist;verbal cues;nonverbal cues (demo/gesture)  -     Assistive Device (Bed-Chair Transfers) other (see comments)  BUE support  -     Comment, (Bed-Chair Transfer) V/TC for sequencing, L knee blocked, did demonstrate buckling on 3rd step  -       Row Name 03/30/25 1015          Sit-Stand Transfer    Sit-Stand Edgar (Transfers) moderate assist (50% patient effort);2 person  assist;verbal cues;nonverbal cues (demo/gesture)  -     Assistive Device (Sit-Stand Transfers) other (see comments)  BUE support  -     Comment, (Sit-Stand Transfer) V/TC for LE set up, hand placement  -       Row Name 03/30/25 1015          Gait/Stairs (Locomotion)    [place] Level (Gait) unable to assess  -     Comment, (Gait/Stairs) deferred due to LLE weakness/buckling w/transfer; did take 3 steps to chair  -               User Key  (r) = Recorded By, (t) = Taken By, (c) = Cosigned By      Initials Name Provider Type     Estelle Rangel, PT Physical Therapist                   Obj/Interventions       Row Name 03/30/25 1135          Range of Motion Comprehensive    General Range of Motion bilateral lower extremity ROM WFL  -     Comment, General Range of Motion AAROM WFL  -       Row Name 03/30/25 1135          Strength Comprehensive (MMT)    Comment, General Manual Muscle Testing (MMT) Assessment RLE gross 3+/5, LLE gross 3/5;  -       Row Name 03/30/25 1135          Motor Skills    Motor Skills coordination  -     Coordination gross motor deficit;left;lower extremity;minimal impairment  -       Row Name 03/30/25 1135          Balance    Balance Assessment sitting static balance;sitting dynamic balance;standing dynamic balance;standing static balance  -     Static Sitting Balance contact guard  -     Dynamic Sitting Balance contact guard  -     Position, Sitting Balance unsupported;sitting edge of bed  -     Static Standing Balance moderate assist  -     Dynamic Standing Balance moderate assist;2-person assist  -     Position/Device Used, Standing Balance supported;other (see comments)  BUE support  -     Balance Interventions sitting;standing;sit to stand;supported;static;dynamic  -       Row Name 03/30/25 1135          Sensory Assessment (Somatosensory)    Sensory Assessment (Somatosensory) LE sensation intact  RLE intact; LLE intact but greatly diminished  -                User Key  (r) = Recorded By, (t) = Taken By, (c) = Cosigned By      Initials Name Provider Type    SS Estelle Rangel, PT Physical Therapist                   Goals/Plan       Row Name 03/30/25 1142          Bed Mobility Goal 1 (PT)    Activity/Assistive Device (Bed Mobility Goal 1, PT) bed mobility activities, all  -SS     [place] Level/Cues Needed (Bed Mobility Goal 1, PT) minimum assist (75% or more patient effort)  -SS     Time Frame (Bed Mobility Goal 1, PT) short term goal (STG);5 days  -       Row Name 03/30/25 1142          Transfer Goal 1 (PT)    Activity/Assistive Device (Transfer Goal 1, PT) sit-to-stand/stand-to-sit;bed-to-chair/chair-to-bed  -SS     [place] Level/Cues Needed (Transfer Goal 1, PT) minimum assist (75% or more patient effort)  -SS     Time Frame (Transfer Goal 1, PT) long term goal (LTG);10 days  -       Row Name 03/30/25 1142          Gait Training Goal 1 (PT)    Activity/Assistive Device (Gait Training Goal 1, PT) gait (walking locomotion);assistive device use;walker, rolling  -SS     [place] Level (Gait Training Goal 1, PT) moderate assist (50-74% patient effort)  -SS     Distance (Gait Training Goal 1, PT) 15  -SS     Time Frame (Gait Training Goal 1, PT) long term goal (LTG);10 days  -       Row Name 03/30/25 1142          Balance Goal 1 (PT)    Activity/Assistive Device (Balance Goal) standing static balance;standing dynamic balance  -SS     [place] Level/Cues Needed (Balance Goal 1, PT) contact guard required  -SS     Time Frame (Balance Goal 1, PT) long-term goal (LTG);1 week  -       Row Name 03/30/25 1142          Therapy Assessment/Plan (PT)    Planned Therapy Interventions (PT) balance training;bed mobility training;gait training;home exercise program;motor coordination training;neuromuscular re-education;patient/family education;postural re-education;ROM (range of motion);strengthening;stretching;transfer training  -               User Key   (r) = Recorded By, (t) = Taken By, (c) = Cosigned By      Initials Name Provider Type     Estelle Rangel, PT Physical Therapist                   Clinical Impression       Row Name 03/30/25 1138          Pain    Pretreatment Pain Rating 0/10 - no pain  -     Posttreatment Pain Rating 0/10 - no pain  -SS       Row Name 03/30/25 1138          Plan of Care Review    Plan of Care Reviewed With patient  -SS     Outcome Evaluation Pt. presents below baseline function w/L sided weakness, balance deficits and decreased functional endurance affecting his ability to safely participate in functional mobility. She performed bed mobility and transfers w/mod assist of 2. Activity limited by L sided weakness, buckling. Pt. requires increased time for activity. Pt. would benefit from acute PT services to address stated deficits.  -       Row Name 03/30/25 1138          Therapy Assessment/Plan (PT)    Patient/Family Therapy Goals Statement (PT) to return to PLOF  -     Rehab Potential (PT) good  -SS     Criteria for Skilled Interventions Met (PT) yes;meets criteria;skilled treatment is necessary  -     Therapy Frequency (PT) daily  -     Predicted Duration of Therapy Intervention (PT) 10 days  -       Row Name 03/30/25 1138          Vital Signs    Pre Systolic BP Rehab 188  -SS     Pre Treatment Diastolic BP 73  -SS     Post Systolic BP Rehab 195  RN present/aware  -SS     Post Treatment Diastolic BP 81  -SS     Pretreatment Heart Rate (beats/min) 53  -SS     Posttreatment Heart Rate (beats/min) 47  -SS     Pre SpO2 (%) 100  -SS     O2 Delivery Pre Treatment room air  -SS     Post SpO2 (%) 100  -SS     O2 Delivery Post Treatment room air  -SS     Pre Patient Position Supine  -SS     Post Patient Position Sitting  -       Row Name 03/30/25 1138          Positioning and Restraints    Pre-Treatment Position in bed  -SS     Post Treatment Position chair  -SS     In Chair notified nsg;reclined;call light within  reach;encouraged to call for assist;exit alarm on;waffle cushion;on mechanical lift sling;legs elevated;with nsg  -SS               User Key  (r) = Recorded By, (t) = Taken By, (c) = Cosigned By      Initials Name Provider Type    Estelle Hart, PT Physical Therapist                   Outcome Measures       Row Name 03/30/25 1143          How much help from another person do you currently need...    Turning from your back to your side while in flat bed without using bedrails? 2  -SS     Moving from lying on back to sitting on the side of a flat bed without bedrails? 2  -SS     Moving to and from a bed to a chair (including a wheelchair)? 2  -SS     Standing up from a chair using your arms (e.g., wheelchair, bedside chair)? 2  -SS     Climbing 3-5 steps with a railing? 1  -SS     To walk in hospital room? 2  -SS     AM-PAC 6 Clicks Score (PT) 11  -SS     Highest Level of Mobility Goal 4 --> Transfer to chair/commode  -       Row Name 03/30/25 1143 03/30/25 1141       Modified [place] Scale    Pre-Stroke Modified [place] Scale 6 - Unable to determine (UTD) from the medical record documentation  -SS 6 - Unable to determine (UTD) from the medical record documentation  -AR    Modified Sweta Scale 4 - Moderately severe disability.  Unable to walk without assistance, and unable to attend to own bodily needs without assistance.  -SS 4 - Moderately severe disability.  Unable to walk without assistance, and unable to attend to own bodily needs without assistance.  -AR      Row Name 03/30/25 1143 03/30/25 1141       Functional Assessment    Outcome Measure Options AM-PAC 6 Clicks Basic Mobility (PT);Modified [place]  -SS AM-PAC 6 Clicks Daily Activity (OT);Modified Sweta  -AR              User Key  (r) = Recorded By, (t) = Taken By, (c) = Cosigned By      Initials Name Provider Type    Charissa Garcia, OT Occupational Therapist    SS Estelle Rangel, LUIS Physical Therapist                                 Physical  Therapy Education       Title: PT OT SLP Therapies (In Progress)       Topic: Physical Therapy (In Progress)       Point: Mobility training (Done)       Learning Progress Summary            Patient ESTELA Spears, VU,DU,NR by  at 3/30/2025 1144    Comment: Educated pt. safety/technique w/bed mobility, transfers, PT POC                      Point: Home exercise program (Not Started)       Learner Progress:  Not documented in this visit.              Point: Body mechanics (Done)       Learning Progress Summary            Patient ESTELA Spears, VU,DU,NR by  at 3/30/2025 1144    Comment: Educated pt. safety/technique w/bed mobility, transfers, PT POC                      Point: Precautions (Done)       Learning Progress Summary            Patient ESTELA Spears, VU,DU,NR by  at 3/30/2025 1144    Comment: Educated pt. safety/technique w/bed mobility, transfers, PT POC                                      User Key       Initials Effective Dates Name Provider Type Discipline     06/01/21 -  Estelle Rangel, PT Physical Therapist PT                  PT Recommendation and Plan  Planned Therapy Interventions (PT): balance training, bed mobility training, gait training, home exercise program, motor coordination training, neuromuscular re-education, patient/family education, postural re-education, ROM (range of motion), strengthening, stretching, transfer training  Outcome Evaluation: Pt. presents below baseline function w/L sided weakness, balance deficits and decreased functional endurance affecting his ability to safely participate in functional mobility. She performed bed mobility and transfers w/mod assist of 2. Activity limited by L sided weakness, buckling. Pt. requires increased time for activity. Pt. would benefit from acute PT services to address stated deficits.     Time Calculation:   PT Evaluation Complexity  History, PT Evaluation Complexity: 3 or more personal factors and/or comorbidities  Examination of Body Systems (PT  Eval Complexity): total of 4 or more elements  Clinical Presentation (PT Evaluation Complexity): evolving  Clinical Decision Making (PT Evaluation Complexity): moderate complexity  Overall Complexity (PT Evaluation Complexity): moderate complexity     PT Charges       Row Name 03/30/25 1144             Time Calculation    Start Time 0909  -SS      PT Received On 03/30/25  -SS      PT Goal Re-Cert Due Date 04/09/25  -SS         Timed Charges    32640 - PT Therapeutic Activity Minutes 10  -SS         Untimed Charges    PT Eval/Re-eval Minutes 53  -SS         Total Minutes    Timed Charges Total Minutes 10  -SS      Untimed Charges Total Minutes 53  -SS       Total Minutes 63  -SS                User Key  (r) = Recorded By, (t) = Taken By, (c) = Cosigned By      Initials Name Provider Type     Estelle Rangel, PT Physical Therapist                  Therapy Charges for Today       Code Description Service Date Service Provider Modifiers Qty    53542322238 HC PT THERAPEUTIC ACT EA 15 MIN 3/30/2025 Estelle Rangel, PT GP 1    64442754554 HC PT EVAL MOD COMPLEXITY 4 3/30/2025 Estelle Rangel, PT GP 1            PT G-Codes  Outcome Measure Options: AM-PAC 6 Clicks Basic Mobility (PT), Modified [place]  AM-PAC 6 Clicks Score (PT): 11  AM-PAC 6 Clicks Score (OT): 13  Modified Sweta Scale: 4 - Moderately severe disability.  Unable to walk without assistance, and unable to attend to own bodily needs without assistance.  PT Discharge Summary  Anticipated Discharge Disposition (PT): inpatient rehabilitation facility    Estelle Rangel PT  3/30/2025

## 2025-03-30 NOTE — PROGRESS NOTES
Stroke Neurology Progress Note     Subjective     This patient was seen in follow-up for: complex headache versus MRI negative stroke  Present for the encounter were: self, patient     Subjective:  My first encounter with the patient.  No acute events overnight per chart review.  I saw and examined the patient at bedside.  She appears to be uncomfortable and reports 8/10 headache.  Will initiate migraine cocktail.  Patient's physical exam is notable for dysarthria, left hemiparesis, right leg drift, and right arm and leg numbness.    Objective      Temp:  [97.7 °F (36.5 °C)-97.8 °F (36.6 °C)] 97.8 °F (36.6 °C)  Heart Rate:  [44-60] 59  Resp:  [16-18] 16  BP: (142-188)/(62-83) 182/75            Objective    Physical Exam:  General Appearance: Alert  HEENT: anicteric sclera, no scleral injection  Lungs: respirations appear comfortable, no obvious increased work of breathing  Extremities: No cyanosis or fingernail clubbing   Skin: No rashes in exposed skin areas     Neurological Examination:   Mental status: Alert and oriented.  Mild dysarthria. Able to name and repeat.  Cranial Nerves: Visual fields intact. Extraocular movements intact with no nystagmus. Midline gaze.  Left facial droop.  Sensory: Normal sensory exam to light touch.  Motor: Normal tone. Absent pronator drift.  Strength:  LUE: 3/5 biceps, triceps,   LLE: 3/5 hip flexion/extension  RUE: 5/5 biceps, triceps,   RLE:  Drift, 4/5 hip flexion/extension      Labs:    Lab Results   Component Value Date    HGBA1C 5.9 07/23/2016      Lab Results   Component Value Date    CHLPL 155 07/23/2016    TRIG 84 07/23/2016    HDL 34 (L) 07/23/2016     (H) 07/23/2016       Lab Results   Component Value Date    WBC 7.75 03/29/2025    HGB 15.0 03/29/2025    HCT 44 03/29/2025    MCV 97.2 (H) 03/29/2025     03/29/2025     Lab Results   Component Value Date    GLUCOSE 104 (H) 07/24/2016    BUN 12 07/24/2016    CREATININE 1.00 03/29/2025    BCR 24.4 (H)  "08/08/2015    CO2 21 (L) 07/24/2016    CALCIUM 9.5 07/24/2016    ALBUMIN 3.8 07/23/2016    AST 15 03/29/2025    ALT 9 03/29/2025       Results from last 7 days   Lab Units 03/29/25  1639 03/29/25  1634   CREATININE mg/dL 1.00  --    ALT (SGPT) U/L  --  9   AST (SGOT) U/L  --  15       No results found for: \"BLOODCX\"  UA          3/29/2025    17:47   Urinalysis   Specific Lee, UA 1.035    Ketones, UA Negative    Blood, UA Negative    Leukocytes, UA Negative    Nitrite, UA Negative        Results Review:      All brain images and reports were personally reviewed and I agree with the interpretations except as noted below.    MRI Brain Without Contrast 3/30/2025  Impression: Mild chronic microvascular ischemia. No acute intracranial process.     CT Angiogram Head and Neck 3/29/2025  Impression: 1. No hemodynamically significant stenosis, large vessel cut off or aneurysms in the intracranial circulation 2. No hemodynamically significant stenosis, dissection or aneurysms in the extracranial circulation.     CT Perfusion 3/29/2025  Impression: 1. No evidence of core infarct nor ischemic brain at risk.     CT Head Without Contrast 3/29/2025  Impression: No acute intracranial abnormality.           Assessment/Plan     Assessment:    Complex headache versus MRI negative stroke  -Migraine cocktail ordered:  magnesium 2 g IV daily, prednisone 20 mg daily, prochlorperazine 10 mg every 6 hours, diphenhydramine 12.5 mg every 6 hours, valproic acid 500 mg daily, pantoprazole, and IV fluids  -Can add ketorolac or gabapentin if patient's headache does not improve.  -Headache prevention with magnesium 400 mg daily and riboflavin 400 mg daily as outpatient  -Follow-up in headache clinic    Chronic multifocal infarct  History of PE  -Continue home apixaban 5 mg twice daily  -Continue atorvastatin 80 mg daily        Patient education: call 911 or present to emergency department with any stroke symptom, including unilateral " face, arm, or leg weakness, numbness, or paresthesias, unilateral facial droop, speech deficits, dizziness with nausea, vomiting, nystagmus, and incoordination, visual deficits, or severe onset headache.    Stroke neurology will follow.  PT OT recommend IRF.  Anticipate discharge tomorrow if headache is better.  Please call with questions.     Zahida Mcqueen MD  [facility] [facility]  03/30/25  12:54 EDT

## 2025-03-30 NOTE — PLAN OF CARE
Goal Outcome Evaluation:  Plan of Care Reviewed With: patient           Outcome Evaluation: Pt. presents below baseline function w/L sided weakness, balance deficits and decreased functional endurance affecting his ability to safely participate in functional mobility. She performed bed mobility and transfers w/mod assist of 2. Activity limited by L sided weakness, buckling. Pt. requires increased time for activity. Pt. would benefit from acute PT services to address stated deficits.    Anticipated Discharge Disposition (PT): inpatient rehabilitation facility

## 2025-03-30 NOTE — SIGNIFICANT NOTE
03/30/25 1638   SLP Deferred Reason   SLP Deferred Reason   (SLP order received. Will f/u for evaluation tomorrow.)

## 2025-03-31 LAB
GLUCOSE BLDC GLUCOMTR-MCNC: 138 MG/DL (ref 70–130)
GLUCOSE BLDC GLUCOMTR-MCNC: 167 MG/DL (ref 70–130)
GLUCOSE BLDC GLUCOMTR-MCNC: 198 MG/DL (ref 70–130)
GLUCOSE BLDC GLUCOMTR-MCNC: 200 MG/DL (ref 70–130)

## 2025-03-31 PROCEDURE — 25010000002 MAGNESIUM SULFATE 2 GM/50ML SOLUTION: Performed by: STUDENT IN AN ORGANIZED HEALTH CARE EDUCATION/TRAINING PROGRAM

## 2025-03-31 PROCEDURE — 96376 TX/PRO/DX INJ SAME DRUG ADON: CPT

## 2025-03-31 PROCEDURE — G0378 HOSPITAL OBSERVATION PER HR: HCPCS

## 2025-03-31 PROCEDURE — 99213 OFFICE O/P EST LOW 20 MIN: CPT

## 2025-03-31 PROCEDURE — 25010000002 DIPHENHYDRAMINE PER 50 MG: Performed by: STUDENT IN AN ORGANIZED HEALTH CARE EDUCATION/TRAINING PROGRAM

## 2025-03-31 PROCEDURE — 92523 SPEECH SOUND LANG COMPREHEN: CPT

## 2025-03-31 PROCEDURE — 96366 THER/PROPH/DIAG IV INF ADDON: CPT

## 2025-03-31 PROCEDURE — 82948 REAGENT STRIP/BLOOD GLUCOSE: CPT

## 2025-03-31 PROCEDURE — 99232 SBSQ HOSP IP/OBS MODERATE 35: CPT | Performed by: FAMILY MEDICINE

## 2025-03-31 PROCEDURE — 25010000002 PROCHLORPERAZINE 10 MG/2ML SOLUTION: Performed by: STUDENT IN AN ORGANIZED HEALTH CARE EDUCATION/TRAINING PROGRAM

## 2025-03-31 PROCEDURE — 63710000001 PREDNISONE PER 1 MG: Performed by: STUDENT IN AN ORGANIZED HEALTH CARE EDUCATION/TRAINING PROGRAM

## 2025-03-31 PROCEDURE — 63710000001 INSULIN LISPRO (HUMAN) PER 5 UNITS

## 2025-03-31 RX ORDER — INSULIN LISPRO 100 [IU]/ML
2-7 INJECTION, SOLUTION INTRAVENOUS; SUBCUTANEOUS
Status: DISCONTINUED | OUTPATIENT
Start: 2025-03-31 | End: 2025-04-04 | Stop reason: HOSPADM

## 2025-03-31 RX ADMIN — SACUBITRIL AND VALSARTAN 1 TABLET: 24; 26 TABLET, FILM COATED ORAL at 19:56

## 2025-03-31 RX ADMIN — LEVOTHYROXINE SODIUM 75 MCG: 0.07 TABLET ORAL at 05:35

## 2025-03-31 RX ADMIN — APIXABAN 5 MG: 5 TABLET, FILM COATED ORAL at 08:52

## 2025-03-31 RX ADMIN — PREDNISONE 20 MG: 20 TABLET ORAL at 08:52

## 2025-03-31 RX ADMIN — Medication 10 ML: at 08:51

## 2025-03-31 RX ADMIN — AMOXICILLIN AND CLAVULANATE POTASSIUM 1 TABLET: 875; 125 TABLET, FILM COATED ORAL at 08:52

## 2025-03-31 RX ADMIN — INSULIN LISPRO 2 UNITS: 100 INJECTION, SOLUTION INTRAVENOUS; SUBCUTANEOUS at 22:16

## 2025-03-31 RX ADMIN — PROCHLORPERAZINE EDISYLATE 10 MG: 5 INJECTION INTRAMUSCULAR; INTRAVENOUS at 12:16

## 2025-03-31 RX ADMIN — APIXABAN 5 MG: 5 TABLET, FILM COATED ORAL at 19:56

## 2025-03-31 RX ADMIN — INSULIN LISPRO 2 UNITS: 100 INJECTION, SOLUTION INTRAVENOUS; SUBCUTANEOUS at 12:15

## 2025-03-31 RX ADMIN — PANTOPRAZOLE SODIUM 40 MG: 40 TABLET, DELAYED RELEASE ORAL at 05:35

## 2025-03-31 RX ADMIN — MAGNESIUM SULFATE HEPTAHYDRATE 2 G: 40 INJECTION, SOLUTION INTRAVENOUS at 09:01

## 2025-03-31 RX ADMIN — DIPHENHYDRAMINE HYDROCHLORIDE 12.5 MG: 50 INJECTION INTRAMUSCULAR; INTRAVENOUS at 12:16

## 2025-03-31 RX ADMIN — CETIRIZINE HYDROCHLORIDE 10 MG: 10 TABLET, FILM COATED ORAL at 08:52

## 2025-03-31 RX ADMIN — VALPROATE SODIUM 500 MG: 100 INJECTION, SOLUTION INTRAVENOUS at 08:53

## 2025-03-31 RX ADMIN — SUCRALFATE 1 G: 1 TABLET ORAL at 19:56

## 2025-03-31 RX ADMIN — DIPHENHYDRAMINE HYDROCHLORIDE 12.5 MG: 50 INJECTION INTRAMUSCULAR; INTRAVENOUS at 22:16

## 2025-03-31 RX ADMIN — FLUOXETINE HYDROCHLORIDE 60 MG: 20 CAPSULE ORAL at 08:52

## 2025-03-31 RX ADMIN — INSULIN LISPRO 3 UNITS: 100 INJECTION, SOLUTION INTRAVENOUS; SUBCUTANEOUS at 17:25

## 2025-03-31 RX ADMIN — SUCRALFATE 1 G: 1 TABLET ORAL at 17:25

## 2025-03-31 RX ADMIN — SUCRALFATE 1 G: 1 TABLET ORAL at 08:52

## 2025-03-31 RX ADMIN — PROCHLORPERAZINE EDISYLATE 10 MG: 5 INJECTION INTRAMUSCULAR; INTRAVENOUS at 22:16

## 2025-03-31 RX ADMIN — ATORVASTATIN CALCIUM 80 MG: 40 TABLET, FILM COATED ORAL at 19:57

## 2025-03-31 RX ADMIN — AMOXICILLIN AND CLAVULANATE POTASSIUM 1 TABLET: 875; 125 TABLET, FILM COATED ORAL at 19:57

## 2025-03-31 RX ADMIN — SACUBITRIL AND VALSARTAN 1 TABLET: 24; 26 TABLET, FILM COATED ORAL at 09:01

## 2025-03-31 NOTE — THERAPY EVALUATION
"Acute Care - Speech Language Pathology Initial Evaluation  [facility]  Cognitive-Communication Evaluation\     Patient Name: Rehana Hamilton  : 1970  MRN: [id number]  Today's Date: 3/31/2025               Admit Date: 3/29/2025     Visit Dx:    ICD-10-CM ICD-9-CM   1. Acute left-sided weakness  R53.1 728.87   2. Lethargy  R53.83 780.79   3. Headache, variant migraine  G43.809 346.20   4. Dysarthria  R47.1 784.51     Patient Active Problem List   Diagnosis    Allergic rhinitis    GERD (gastroesophageal reflux disease)    Hypertension    Migraine    Sleep apnea    Rheumatoid arthritis    Type 2 diabetes mellitus    Congestive heart failure    Chronic obstructive lung disease    Colon cancer    Hypothyroidism    Encounter for colonoscopy due to history of colon cancer    Gastroesophageal reflux disease    Personal history of colon cancer    Abdominal pain    Acute pulmonary edema    Blood in feces    Chronic nausea    Esophagitis, reflux    Medically noncompliant    Pedal edema    SOB (shortness of breath)    Pulmonary embolism with infarction    Cancer of transverse colon    Hypertension    TIA (transient ischemic attack)     Past Medical History:   Diagnosis Date    Allergic rhinitis     Cancer of transverse colon     Personal history of colon cancer    Colon cancer 10/11/2016    Congestive heart failure     COPD (chronic obstructive pulmonary disease)     Depression     Disease of thyroid gland     Esophagitis     GERD (gastroesophageal reflux disease)     History of degenerative disc disease     History of migraine     History of nuclear stress test     \"IT WAS NORMAL\"    History of sleep apnea     NO CPAP    Hypertension     Personal history of congestive heart failure     PONV (postoperative nausea and vomiting)     Rheumatoid arthritis     Tattoo     X5    Type 2 diabetes mellitus      Past Surgical History:   Procedure Laterality Date    CHOLECYSTECTOMY      COLON RESECTION  2014    " "SECONDARY TO COLON CANCER    COLONOSCOPY N/A 10/2/2017    Procedure: DIAGNOSTIC COLONOSCOPY;  Surgeon: Elina Summers MD;  Location: [facility] ENDOSCOPY;  Service:     COLONOSCOPY N/A 12/7/2018    Procedure: COLONOSCOPY;  Surgeon: Elina Summers MD;  Location: [facility] ENDOSCOPY;  Service: Gastroenterology    COLONOSCOPY N/A 9/11/2020    Procedure: COLONOSCOPY W/ COLD SNARE POLYPECTOMY;  Surgeon: Elina Summers MD;  Location: [facility] ENDOSCOPY;  Service: Gastroenterology;  Laterality: N/A;    COLONOSCOPY W/ BIOPSIES      COLONOSCOPY W/ POLYPECTOMY      DILATION AND CURETTAGE, DIAGNOSTIC / THERAPEUTIC      ENDOSCOPY N/A 10/2/2017    Procedure: ESOPHAGOGASTRODUODENOSCOPY WITH BIOPSY;  Surgeon: Elina Summers MD;  Location: [facility] ENDOSCOPY;  Service:     HYSTERECTOMY      Total Abdominal Hysterectomy with Removal of Both Ovaries    TUBAL ABDOMINAL LIGATION      UPPER GASTROINTESTINAL ENDOSCOPY      Diagnostic       SLP Recommendation and Plan  SLP Diagnosis: baseline, cognitive-linguistic disorder, dysarthria (03/31/25 1000)  SLP Diagnosis Comments: Pt's speech is slow but 100% intelligible in unknown context w unfamiliar listener. receptive and expressive language are intact and there are no word finding errors in conversation. Pt is tangential and topic maintenance is poor. After we discuss \"speech\" deficits, pt begins to have dysfluencies which had not been present in earlier conversation or during conversation w other staff observed. Pt is oriented. (03/31/25 1000)           [facility] [place]illed [place] Interventions Met: no problems identified which require skilled intervention (03/31/25 1000)  Anticipated Discharge Disposition (SLP): No further SLP services warranted (03/31/25 1000)                                         Progress: no change (03/31/25 1153)      SLP EVALUATION (Last 72 Hours)       SLP SLC Evaluation       Row Name 03/31/25 1000                   Communication " Assessment/Intervention    Document Type evaluation  -RS        Subjective Information no complaints  -RS        Patient Observations alert;cooperative  -RS        Patient/Family/Caregiver Comments/Observations none present  -RS           General Information    Patient Profile Reviewed yes  -RS        Pertinent History Of Current Problem Pt is a 54 yoF w PMHx B CVA 3/2024, PE, HTN, DM2, CHF, COPD, RA, SORAYA,colorectal CA s/p colectomy in 2022, and hypothyroidism who presented to [facility] 3/29 w headache, dysarthria, and worsening left-sided weakness. MRI is negative for acute stroke.  -RS        Precautions/Limitations, Vision WFL;for purposes of eval  -RS        Precautions/Limitations, Hearing WFL;for purposes of eval  -RS        Prior Level of Function-Communication cognitive-linguistic impairment;other (see comments)  since CVA 3/2024 per pt report  -RS        Plans/Goals Discussed with patient  -RS           Pain    Pretreatment Pain Rating 3/10  -RS        Posttreatment Pain Rating 3/10  -RS        Pain Location head  -RS           Comprehension Assessment/Intervention    Comprehension Assessment/Intervention Auditory Comprehension  -RS           Auditory Comprehension Assessment/Intervention    Auditory Comprehension (Communication) WFL  -RS           Expression Assessment/Intervention    Expression Assessment/Intervention verbal expression  -RS           Verbal Expression Assessment/Intervention    Verbal Expression WFL  -RS           Oral Motor Structure and Function    Oral Motor Structure and Function WFL  -RS        Dentition Assessment poor oral hygiene;missing teeth  -RS        Mucosal Quality moist, healthy  -RS           Oral Musculature and Cranial Nerve Assessment    Oral Motor General Assessment generalized oral motor weakness  -RS           Motor Speech Assessment/Intervention    Characteristics Consistent with Dysarthria decreased articulation  -RS        Conversational Speech (Communication) WFL  -RS   "      Speech intelligibility 100%;with unfamiliar listener  -RS        Motor Speech, Comment pt states that she is at her baseline  -RS           Cursory Voice Assessment/Intervention    Quality and Resonance (Voice) WFL  -RS           Cognitive Assessment Intervention- SLP    Orientation Status (Cognition) WFL  -RS        Attention (Cognitive) moderate impairment  -RS           SLP Evaluation Clinical Impressions    SLP Diagnosis baseline;cognitive-linguistic disorder;dysarthria  -RS        SLP Diagnosis Comments Pt's speech is slow but 100% intelligible in unknown context w unfamiliar listener. receptive and expressive language are intact and there are no word finding errors in conversation. Pt is tangential and topic maintenance is poor. After we discuss \"speech\" deficits, pt begins to have dysfluencies which had not been present in earlier conversation or during conversation w other staff observed. Pt is oriented.  -[facility] Criteria for Skilled Therapy Interventions Met no problems identified which require skilled intervention  -RS           Recommendations    Anticipated Discharge Disposition (SLP) No further SLP services warranted  -RS                  User Key  (r) = Recorded By, (t) = Taken By, (c) = Cosigned By      Initials Name Effective Dates    Sebastian Zamora MS CCC-SLP 09/14/23 -                        EDUCATION  The patient has been educated in the following areas:     Communication Impairment.                        Time Calculation:      Time Calculation- SLP       Row Name 03/31/25 1150             Time Calculation- SLP    SLP Start Time 0915  -RS      SLP Received On 03/31/25  -RS         Untimed Charges    12254-GR Eval Speech and Production w/ Language Minutes 40  -RS         Total Minutes    Untimed Charges Total Minutes 40  -RS       Total Minutes 40  -RS                User Key  (r) = Recorded By, (t) = Taken By, (c) = Cosigned By      Initials Name Provider Type    Sebastian Zamora MS" CCC-SLP Speech and Language Pathologist                    Therapy Charges for Today       Code Description Service Date Service Provider Modifiers Qty    [id number]  ST EVAL SPEECH AND PROD W LANG  3 3/31/2025 Sebastian Stevenson MS CCC-SLP GN 1                       Sebastian Stevenson MS CCC-SLP  3/31/2025

## 2025-03-31 NOTE — CASE MANAGEMENT/SOCIAL WORK
Discharge Planning Assessment  [facility]     Patient Name: Rehana Hamilton  MRN: [id number]  Today's Date: 3/31/2025    Admit Date: 3/29/2025    Plan: IDP   Discharge Needs Assessment    No documentation.                  Discharge Plan       Row Name 03/31/25 1202       Plan    Plan IDP    Patient/Family in Agreement with Plan yes    Plan Comments Spoke with patient at bedside for IDP and SDOH concerns per consult. Pt lives in [place] with spouse. Enters through back door as to not let dogs run out. IADL. Has rollator and BSC. PCP Kimberly Paez. [employer] [place] with scripts filled at [facility]. Therapy has recommended IRF for safest discharge plan with patient agreeable. Referrals to [facility] and [facility]. Discussed with patient that she has medicaid transport for assist with transportation to dr cortes. Pt expresses how unhappy she is with the way they will only set up transportation with a 72 hour notice. MSW made aware of other SDOH concerns to provide resources. CM will cont to follow.                  Continued Care and Services - Admitted Since 3/29/2025       Destination       Service Provider Request Status Services Address Phone Fax Patient Preferred    [facility] Pending - No Request Sent -- [address] [address] [phone] --    [facility] ACUTE IP Pending - No Request Sent -- [address] [address] [phone] --                  Expected Discharge Date and Time       Expected Discharge Date Expected Discharge Time    Mar 31, 2025            Demographic Summary       Row Name 03/31/25 1159       General Information    Admission Type observation    Arrived From emergency department    Referral Source emergency department    Reason for Consult discharge planning    Preferred Language English                   Functional Status       Row Name 03/31/25 1201       Functional Status     Usual Activity Tolerance moderate    Current Activity Tolerance moderate       Physical Activity    On average, how many days per week do you engage in moderate to strenuous exercise (like a brisk walk)? 0 days    On average, how many minutes do you engage in exercise at this level? 0 min    Number of minutes of exercise per week 0       Functional Status, IADL    Medications independent    Meal Preparation independent    Housekeeping independent    Laundry independent    Shopping independent    If for any reason you need help with day-to-day activities such as bathing, preparing meals, shopping, managing finances, etc., do you get the help you need? I get all the help I need                   Psychosocial    No documentation.                  Abuse/Neglect    No documentation.                  Legal    No documentation.                  Substance Abuse    No documentation.                  Patient Forms    No documentation.                     Alecia Guzman RN

## 2025-03-31 NOTE — DISCHARGE PLACEMENT REQUEST
"Rehana Hamilton (54 y.o. Female)     Alecia  [phone]      Date of Birth   1970    Social Security Number       Address   [address] [place] [id number]    Home Phone   [phone]    MRN   [id number]       [employer]   Patient Refused    Marital Status                               Admission Date   3/29/2025    Admission Type   Emergency    Admitting Provider   AMARJIT Carl DO    Attending Provider   AMARJIT Carl DO    Department, Room/Bed   [facility] 3F, S326/1       Discharge Date       Discharge Disposition       Discharge Destination                                 Attending Provider: AMARJIT Carl DO    Allergies: Ciprofloxacin    Isolation: None   Infection: None   Code Status: CPR    Ht: 162 cm (63.78\")   Wt: 93 kg (205 lb 0.4 oz)    Admission Cmt: None   Principal Problem: TIA (transient ischemic attack) [G45.9]                   Active Insurance as of 3/29/2025       Primary Coverage       Payor Plan Insurance Group Employer/Plan Group    [facility] BY NICOLAS [id number]       Payor Plan Address Payor Plan Phone Number Payor Plan Fax Number Effective Dates    [address] [address]   2021 - None Entered    [facility] [phone]         Subscriber Name Subscriber Birth Date Member ID       REHANA HAMILTON 1970 [id number]                     Emergency Contacts        (Rel.) Home Phone Work Phone Mobile Phone    Jj Hamilton (Spouse) -- -- [phone]                 History & Physical        Lidia Dias MD at [phone]              [facility] Medicine Services  HISTORY AND PHYSICAL    Patient Name: Rehana Hamilton  : 1970  MRN: [id number]  Primary Care Physician: Kimberly Hughes MD  Date of admission: 3/29/2025      Subjective  Subjective     Chief Complaint:  L sided weakness    HPI:   54-year-old, , right-handed female with known diagnosis of " "prior CVA (bilateral, 2024), history of PE (on Eliquis), HTN, T2DM, CHF, COPD, RA, SORAYA, history of colorectal cancer s/p colectomy (2022), and hypothyroidism who presents today with headache, dysarthria, and worsening left-sided weakness.  Patient's spouse evidently noticed the patient becoming weaker throughout the day with slurred speech that began at approximately 1430 and EMS was called.  Per documentation review it appears the patient was seen at [facility] in March 2024 with AMS and dysarthria (was already on Eliquis for PEs at that time) and then transferred to [facility] where she had an MRI that showed \"… Multiple foci of acute infarct above the tentorium bilaterally.  There is involvement in the right posterior watershed territory and left lateral lobe.  Multiple other small foci are noted.\"    Patient seen with family at the bedside. Patient is able to give medical history. She reports has been on Eliquis for a remote hx of PE over 1 year ago and was told to remain on the medication. She is concerned about a dog bite from her own dog a couple of days ago to her lower leg and buttock. Reports she believes they are up to date on rabies vaccine. Denies N/V/F/C. Reports chronic LUE and LLE weakness due to previous stroke however today she has thought to become worse. +Epigastric pain and relates to it not having her \"stomach\" medicine. She is asking for food.            Personal History     Past Medical History:   Diagnosis Date    Allergic rhinitis     Cancer of transverse colon     Personal history of colon cancer    Colon cancer 10/11/2016    Congestive heart failure     COPD (chronic obstructive pulmonary disease)     Depression     Disease of thyroid gland     Esophagitis     GERD (gastroesophageal reflux disease)     History of degenerative disc disease     History of migraine     History of nuclear stress test 2017    \"IT WAS NORMAL\"    History of sleep apnea     NO CPAP    Hypertension     " Personal history of congestive heart failure     PONV (postoperative nausea and vomiting)     Rheumatoid arthritis     Tattoo     X5    Type 2 diabetes mellitus          Oncology Problem List:  Cancer of transverse colon (10/06/2023; Status: Active)  Colon cancer (10/11/2016; Status: Active)    Oncology/Hematology History    No history exists.     Past Surgical History:   Procedure Laterality Date    CHOLECYSTECTOMY      COLON RESECTION  05/01/2014    SECONDARY TO COLON CANCER    COLONOSCOPY N/A 10/2/2017    Procedure: DIAGNOSTIC COLONOSCOPY;  Surgeon: Elina Summers MD;  Location: [facility] ENDOSCOPY;  Service:     COLONOSCOPY N/A 12/7/2018    Procedure: COLONOSCOPY;  Surgeon: Elina Summers MD;  Location: [facility] ENDOSCOPY;  Service: Gastroenterology    COLONOSCOPY N/A 9/11/2020    Procedure: COLONOSCOPY W/ COLD SNARE POLYPECTOMY;  Surgeon: Elina Summers MD;  Location: [facility] ENDOSCOPY;  Service: Gastroenterology;  Laterality: N/A;    COLONOSCOPY W/ BIOPSIES      COLONOSCOPY W/ POLYPECTOMY      DILATION AND CURETTAGE, DIAGNOSTIC / THERAPEUTIC      ENDOSCOPY N/A 10/2/2017    Procedure: ESOPHAGOGASTRODUODENOSCOPY WITH BIOPSY;  Surgeon: Elina Summers MD;  Location: [facility] ENDOSCOPY;  Service:     HYSTERECTOMY      Total Abdominal Hysterectomy with Removal of Both Ovaries    TUBAL ABDOMINAL LIGATION      UPPER GASTROINTESTINAL ENDOSCOPY      Diagnostic       Family History: family history includes Diabetes in her sister; Osteoporosis in her mother; Prostate cancer in her father.     Social History:  reports that she has never smoked. She has never used smokeless tobacco. She reports that she does not drink alcohol and does not use drugs.  Social History     Social History Narrative    Not on file       Medications:  Available home medication information reviewed.  FLUoxetine, Prenatal 27-1, acetaminophen, apixaban, atorvastatin, carvedilol, cetirizine, docusate sodium, empagliflozin,  fluticasone, levothyroxine, lisinopril, metFORMIN, pantoprazole, pravastatin, sacubitril-valsartan, spironolactone, and tiZANidine    Allergies   Allergen Reactions    Ciprofloxacin Hives       Objective  Objective     Vital Signs:   Temp:  [97.7 °F (36.5 °C)] 97.7 °F (36.5 °C)  Heart Rate:  [46-60] 50  Resp:  [18] 18  BP: (142-166)/(62-83) 166/83  Total (NIH Stroke Scale): 12    Physical Exam   Constitutional:  female no acute distress, awake, generalized weakness however answering questions appropriately mild dysarthria   HENT: NCAT, mucous membranes moist  Respiratory: Clear to auscultation bilaterally, respiratory effort normal   Cardiovascular: S1 and S2, bradycardia, no murmurs, rubs, or gallops  Gastrointestinal: Positive bowel sounds, soft, tender in epigastric region nondistended  Musculoskeletal: Moves 4 extremities, LUE 3/5, LLE 2/5, RUE and RLE 4/5  Psychiatric: Flat affect, cooperative  Neurologic: Oriented x 3, speech clear  Skin: Skin physical exam performed with bedside RN, Jaden Andrews. Cellulitis seen around what appears to be a healing wound on R calf. Multiple scratch marks seen on buttock and R knee.     Result Review:  I have personally reviewed the results from the time of this admission to 3/29/2025 21:52 EDT and agree with these findings:  [x]  Laboratory list / accordion  []  Microbiology  [x]  Radiology  []  EKG/Telemetry   []  Cardiology/Vascular   []  Pathology  []  Old records  []  Other:        LAB RESULTS:      Lab 03/29/25  1639 03/29/25  1634   WBC  --  7.75   HEMOGLOBIN  --  15.0   HEMOGLOBIN, POC 15.0  --    HEMATOCRIT  --  45.9   HEMATOCRIT POC 44  --    PLATELETS  --  252   NEUTROS ABS  --  4.47   IMMATURE GRANS (ABS)  --  0.01   LYMPHS ABS  --  2.42   MONOS ABS  --  0.66   EOS ABS  --  0.14   MCV  --  97.2*   LACTATE  --  1.5   PROTIME  --  13.4   INR  --  1.01   APTT  --  30.9         Lab 03/29/25  1639   CREATININE 1.00   EGFR 67.1         Lab 03/29/25  1634   ALT  (SGPT) 9   AST (SGOT) 15                     Lab 03/29/25  1707   PH, ARTERIAL 7.399   PCO2, ARTERIAL 37.7   PO2 .0   FIO2 21   HCO3 ART 23.3   BASE EXCESS ART -1.2*   CARBOXYHEMOGLOBIN 0.7     UA          3/29/2025    17:47   Urinalysis   Specific [place], UA 1.035    Ketones, UA Negative    Blood, UA Negative    Leukocytes, UA Negative    Nitrite, UA Negative        Microbiology Results (last 10 days)       ** No results found for the last 240 hours. **            XR Chest 1 View  Result Date: 3/29/2025  XR CHEST 1 VW Date of Exam: 3/29/2025 4:53 PM EDT Indication: Acute Stroke Protocol (onset < 12 hrs) Comparison: CT chest from September 2, 2015 Findings: The lungs are clear. The heart and mediastinal contours appear normal. There is no pleural effusion. The pulmonary vasculature appears normal. The osseous structures appear intact.     Impression: Impression: No acute cardiopulmonary process. Electronically Signed: Ha Edgar MD  3/29/2025 5:19 PM EDT  Workstation ID: [id number]    CT Angiogram Head w AI Analysis of LVO  Result Date: 3/29/2025  CT ANGIOGRAM HEAD W AI ANALYSIS OF LVO, CT ANGIOGRAM NECK Date of Exam: 3/29/2025 4:28 PM EDT Indication: Neuro Deficit, acute, Stroke suspected Neuro deficit, acute stroke suspected. Comparison: None available. Technique: CTA of the head and neck was performed after the uneventful intravenous administration of 115 cc Isovue-370. Reconstructed coronal and sagittal images were also obtained. In addition, a 3-D volume rendered image was created for interpretation.  Automated exposure control and iterative reconstruction methods were used. Findings: CTA NECK: *Aortic arch: No aneurysm. No significant stenosis or occlusion of the major arch vessels. *Left carotid system: No aneurysm, significant stenosis or occlusion. *Right carotid system: No aneurysm, significant stenosis or occlusion. *Vertebrobasilar system: The vertebral arteries arise from their respective  subclavian arteries. No aneurysm, significant stenosis or occlusion. CTA HEAD: *Anterior circulation: No aneurysm, significant stenosis or occlusion. *Posterior circulation: No aneurysm, significant stenosis or occlusion. *Anatomic variants: None of significance. *Venous sinuses: Patent.     Impression: 1.No hemodynamically significant stenosis, large vessel cut off or aneurysms in the intracranial circulation 2.No hemodynamically significant stenosis, dissection or aneurysms in the extracranial circulation. Electronically Signed: Michael Vázquez MD  3/29/2025 4:58 PM EDT  Workstation ID: [id number]    CT Angiogram Neck  Result Date: 3/29/2025  CT ANGIOGRAM HEAD W AI ANALYSIS OF LVO, CT ANGIOGRAM NECK Date of Exam: 3/29/2025 4:28 PM EDT Indication: Neuro Deficit, acute, Stroke suspected Neuro deficit, acute stroke suspected. Comparison: None available. Technique: CTA of the head and neck was performed after the uneventful intravenous administration of 115 cc Isovue-370. Reconstructed coronal and sagittal images were also obtained. In addition, a 3-D volume rendered image was created for interpretation.  Automated exposure control and iterative reconstruction methods were used. Findings: CTA NECK: *Aortic arch: No aneurysm. No significant stenosis or occlusion of the major arch vessels. *Left carotid system: No aneurysm, significant stenosis or occlusion. *Right carotid system: No aneurysm, significant stenosis or occlusion. *Vertebrobasilar system: The vertebral arteries arise from their respective subclavian arteries. No aneurysm, significant stenosis or occlusion. CTA HEAD: *Anterior circulation: No aneurysm, significant stenosis or occlusion. *Posterior circulation: No aneurysm, significant stenosis or occlusion. *Anatomic variants: None of significance. *Venous sinuses: Patent.     Impression: 1.No hemodynamically significant stenosis, large vessel cut off or aneurysms in the intracranial circulation 2.No  hemodynamically significant stenosis, dissection or aneurysms in the extracranial circulation. Electronically Signed: Michael Vázquez MD  3/29/2025 4:58 PM EDT  Workstation ID: [id number]    CT CEREBRAL PERFUSION WITH & WITHOUT CONTRAST  Result Date: 3/29/2025  CT CEREBRAL PERFUSION W WO CONTRAST Date of Exam: 3/29/2025 4:28 PM EDT Indication: Neuro Deficit, acute, Stroke suspected Neuro deficit, acute stroke suspected.  Comparison: None available. Technique: Axial CT images of the brain were obtained prior to and after the administration of 115 cc Isovue-370. Core blood volume, core blood flow, mean transit time, and Tmax images were obtained utilizing the Rapid software protocol. A limited CT angiogram of the head was also performed to measure the blood vessel density. The radiation dose reduction device was turned on for each scan per the ALARA (As Low as Reasonably Achievable) protocol. Findings:   Cerebral blood flow, blood volume, and mean transit time maps are symmetric without large perfusion defect.  CBF<30% volume: 0mL Tmax>6sec volume: 0 mL Mismatch volume: 0mL Mismatch ratio: None        Impression:  1. No evidence of core infarct nor ischemic brain at risk. Electronically Signed: Michael Vázquez MD  3/29/2025 4:48 PM EDT  Workstation ID: [id number]    CT Head Without Contrast Stroke Protocol  Result Date: 3/29/2025  CT HEAD WO CONTRAST STROKE PROTOCOL Date of Exam: 3/29/2025 4:25 PM EDT Indication: Neuro deficit, acute, stroke suspected Neuro Deficit, acute, Stroke suspected. Comparison: None available. Technique: Axial CT images were obtained of the head without contrast administration.  Reconstructed coronal images were also obtained. Automated exposure control and iterative construction methods were used. Scan Time: 4:24 p.m. Results discussed with stroke team at 4:31 p.m. Findings: Gray-white differentiation is maintained and there is no evidence of intracranial hemorrhage, mass or mass effect. The  ventricles are normal in size and configuration. The orbits are normal. The paranasal sinuses appear clear. The calvarium is intact.     Impression: Impression: No acute intracranial abnormality. Electronically Signed: Joshua Tirado MD  3/29/2025 4:38 PM EDT  Workstation ID: [id number]          Assessment & Plan  Assessment & Plan     54-year-old, , right-handed female with known diagnosis of prior CVA (bilateral, 2024), history of PE (on Eliquis), HTN, T2DM, CHF, COPD, RA, SORAYA, history of colorectal cancer s/p colectomy (2022), and hypothyroidism who presents today with headache, dysarthria, and worsening left-sided weakness.  Patient's spouse evidently noticed the patient becoming weaker throughout the day with slurred speech that began at approximately 1430.     Left sided weakness  Dysarthria  Suspected acute right hemispheric stroke  History of PE - on eliquis  Chronic bilateral strokes  -TIA/CVA order set without thrombolytic therapy has been initiated  -NPO until bedside nursing dysphagia screen completed  -MRI brain wo  -UDS negative  -TTE with bubble  -A1c and lipid panel in AM  -Meds: ASA in the ED, eliquis 5 mg daily to start tomorrow after MRI completed and reviewed  -Activity as tolerated, fall risk precautions     Domestic dog bite, cellulitis of posterior R lower leg  -patient reports bit by her own dog a few days ago. She believes her dogs are up to date on rabies vaccine. With bedside RN present, Jaden Andrews, discussed risk/benefits of rabies vaccine/immunoglobulin. Patient expresses understanding of the seriousness possible life threatening disease of rabies however declines intervention.   -Will order Unasyn for now until passes swallow study then can transition to Augmentin   -ordered tetanus shot     GERD  -continue with PPI, added Carafate    Essential hypertension  -Allow autoregulation of blood pressure for adequate cerebral blood flow  -Nicardipine as needed for SBP >220  -hold  home Entresto 24-26 twice daily, spironolactone 25 daily, carvedilol 25 mg twice daily     Hyperlipidemia  -Lipid panel in AM  -Atorvastatin 80mg nightly    Hypothyroidism  -continue home medications      Diabetes Mellitus type 2  -A1c in AM  -Maintain euglycemia  -ISS, hold Jardiance     Mood disorder  -continue with Fluoxetine 60 mg QD      VTE Prophylaxis:  Pharmacologic & mechanical VTE prophylaxis orders are present.          CODE STATUS:    Code Status and Medical Interventions: CPR (Attempt to Resuscitate); Full Support   Ordered at: 03/29/25 2009     Code Status (Patient has no pulse and is not breathing):    CPR (Attempt to Resuscitate)     Medical Interventions (Patient has pulse or is breathing):    Full Support     Level Of Support Discussed With:    Patient       Expected Discharge   Expected discharge date/ time has not been documented.     Lidia Dias MD  03/29/25      Electronically signed by Lidia Dias MD at 03/29/25 2153       Current Facility-Administered Medications   Medication Dose Route Frequency Provider Last Rate Last Admin    acetaminophen (TYLENOL) tablet 500 mg  500 mg Oral Q6H PRN Lidia Dias MD        Or    acetaminophen (TYLENOL) 160 MG/5ML oral solution 500 mg  500 mg Oral Q6H PRN Lidia Dias MD        Or    acetaminophen (TYLENOL) suppository 325 mg  325 mg Rectal Q6H PRN Lidia Dias MD        amoxicillin-clavulanate (AUGMENTIN) 875-125 MG per tablet 1 tablet  1 tablet Oral Q12H Stacie Ferrell DO   1 tablet at 03/31/25 0852    apixaban (ELIQUIS) tablet 5 mg  5 mg Oral Q12H Zahida Mcqueen MD   5 mg at 03/31/25 0852    atorvastatin (LIPITOR) tablet 80 mg  80 mg Oral Nightly Farzaneh Riley APRN   80 mg at 03/30/25 2103    sennosides-docusate (PERICOLACE) 8.6-50 MG per tablet 2 tablet  2 tablet Oral BID PRN Lidia Dias MD        And    polyethylene glycol (MIRALAX) packet 17 g  17 g Oral Daily PRN Lidia Dias MD        And     bisacodyl (DULCOLAX) EC tablet 5 mg  5 mg Oral Daily PRN Lidia Dias MD        And    bisacodyl (DULCOLAX) suppository 10 mg  10 mg Rectal Daily PRN Lidia Dias MD        calcium carbonate (TUMS) chewable tablet 500 mg (200 mg elemental)  1 tablet Oral TID PRN Lidia Dias MD        Calcium Replacement - Follow Nurse / BPA Driven Protocol   Not Applicable PRLidia Ray MD        cetirizine (zyrTEC) tablet 10 mg  10 mg Oral Daily Lidia Dias MD   10 mg at 03/31/25 0852    dextrose (D50W) (25 g/50 mL) IV injection 25 g  25 g Intravenous Q15 Min PRLidia Ray MD        dextrose (GLUTOSE) oral gel 15 g  15 g Oral Q15 Min PRLidia Ray MD        diphenhydrAMINE (BENADRYL) injection 12.5 mg  12.5 mg Intravenous Q6H Zahida Mcqueen MD   12.5 mg at 03/30/25 2102    FLUoxetine (PROzac) capsule 60 mg  60 mg Oral Daily Lidia Dias MD   60 mg at 03/31/25 0852    glucagon (GLUCAGEN) injection 1 mg  1 mg Intramuscular Q15 Min PRN Lidia Dias MD        Insulin Lispro (humaLOG) injection 2-7 Units  2-7 Units Subcutaneous 4x Daily AC & at Bedtime Bel Taylor, PharmD        levothyroxine (SYNTHROID, LEVOTHROID) tablet 75 mcg  75 mcg Oral Q AM Lidia Dias MD   75 mcg at 03/31/25 0535    Magnesium Standard Dose Replacement - Follow Nurse / BPA Driven Protocol   Not Applicable Lidia Finley MD        nitroglycerin (NITROSTAT) SL tablet 0.4 mg  0.4 mg Sublingual Q5 Min PRLidia Ray MD        pantoprazole (PROTONIX) EC tablet 40 mg  40 mg Oral Q AM Zahida Mcqueen MD   40 mg at 03/31/25 0535    Phosphorus Replacement - Follow Nurse / BPA Driven Protocol   Not Applicable Lidia Finley MD        Potassium Replacement - Follow Nurse / BPA Driven Protocol   Not Applicable PRLidia Ray MD        prochlorperazine (COMPAZINE) injection 10 mg  10 mg Intravenous Q6H Zahida Mcqueen MD   10 mg at 03/30/25 2102    sacubitril-valsartan  (ENTRESTO) 24-26 MG tablet 1 tablet  1 tablet Oral BID AMARJIT Carl, DO   1 tablet at 03/31/25 0901    sodium chloride 0.9 % flush 10 mL  10 mL Intravenous PRN Lidia Dias MD   10 mL at 03/31/25 0851    sodium chloride 0.9 % infusion 40 mL  40 mL Intravenous PRN Lidia Dias MD        sucralfate (CARAFATE) tablet 1 g  1 g Oral 4x Daily AC & at Bedtime Lidia Dias MD   1 g at 03/31/25 0852        Physician Progress Notes (most recent note)        Sheron Paz APRN at 03/31/25 0753          Stroke Neurology Progress Note     Subjective     This patient was seen in follow-up for: complex headache versus MRI negative stroke  Present for the encounter were: self, patient     Subjective: Patient is lying in bed asleep when I entered the room.  No family at the bedside.  Patient is arousable by verbal stimuli, however is drowsy and requires repeated verbal stimulation to participate in exam.  She tells me that headache is better today rating it a 3/10.  We discussed imaging results.  All questions and concerns were answered.      Objective      Temp:  [97.7 °F (36.5 °C)-98.6 °F (37 °C)] 98.6 °F (37 °C)  Heart Rate:  [45-70] 70  Resp:  [16-18] 18  BP: (129-182)/(66-76) 148/76        Objective    Physical Exam:  General Appearance: Alert  HEENT: anicteric sclera, no scleral injection  Lungs: respirations appear comfortable, no obvious increased work of breathing  Extremities: No cyanosis or fingernail clubbing   Skin: No rashes in exposed skin areas     Neurological Examination:   Mental status: Alert and oriented.  Mild dysarthria. Able to name and repeat.  Cranial Nerves: Visual fields intact. Extraocular movements intact with no nystagmus. Midline gaze.  Left facial droop.  Sensory: Normal sensory exam to light touch.  Motor: Normal tone. Absent pronator drift.  Strength:  LUE: 3/5 biceps, triceps,   LLE: 3/5 hip flexion/extension  RUE: 5/5 biceps, triceps,   RLE:  Drift, 4/5 hip  "flexion/extension      Labs:    Lab Results   Component Value Date    HGBA1C 6.00 (H) 03/30/2025      Lab Results   Component Value Date    CHOL 126 03/30/2025    CHLPL 155 07/23/2016    TRIG 59 03/30/2025    HDL 41 03/30/2025    LDL 72 03/30/2025       Lab Results   Component Value Date    WBC 10.56 03/30/2025    HGB 14.7 03/30/2025    HCT 45.0 03/30/2025    MCV 96.6 03/30/2025     03/30/2025     Lab Results   Component Value Date    GLUCOSE 129 (H) 03/30/2025    BUN 11 03/30/2025    CREATININE 0.86 03/30/2025    BCR 12.8 03/30/2025    CO2 22.0 03/30/2025    CALCIUM 9.1 03/30/2025    ALBUMIN 3.8 07/23/2016    AST 15 03/29/2025    ALT 9 03/29/2025       Results from last 7 days   Lab Units 03/30/25  1419 03/29/25  1639 03/29/25  1634   SODIUM mmol/L 137  --   --    POTASSIUM mmol/L 4.1  --   --    CHLORIDE mmol/L 103  --   --    CO2 mmol/L 22.0  --   --    BUN mg/dL 11  --   --    CREATININE mg/dL 0.86 1.00  --    CALCIUM mg/dL 9.1  --   --    ALT (SGPT) U/L  --   --  9   AST (SGOT) U/L  --   --  15   GLUCOSE mg/dL 129*  --   --        No results found for: \"BLOODCX\"  UA          3/29/2025    17:47   Urinalysis   Specific [place], UA 1.035    Ketones, UA Negative    Blood, UA Negative    Leukocytes, UA Negative    Nitrite, UA Negative        Results Review:      All brain images and reports were personally reviewed and I agree with the interpretations except as noted below.    MRI Brain Without Contrast 3/30/2025  Impression: Mild chronic microvascular ischemia. No acute intracranial process.     CT Angiogram Head and Neck 3/29/2025  Impression: 1. No hemodynamically significant stenosis, large vessel cut off or aneurysms in the intracranial circulation 2. No hemodynamically significant stenosis, dissection or aneurysms in the extracranial circulation.     CT Perfusion 3/29/2025  Impression: 1. No evidence of core infarct nor ischemic brain at risk.     CT Head Without Contrast 3/29/2025  Impression: No acute " intracranial abnormality.          Assessment/Plan     Assessment: 54-year-old, , right-handed female with known diagnosis of prior CVA (bilateral, 2024), history of PE (on Eliquis), HTN, T2DM, CHF, COPD, RA, SORAYA, history of colorectal cancer s/p colectomy (2022), and hypothyroidism who presents today with headache, dysarthria, and worsening left-sided weakness.  Patient's spouse reported progressive weakness throughout the day with slurred speech that began at approximately 1430.  NIH on arrival 10.  CT head negative for acute abnormality.  CTA head and neck negative for flow-limiting stenosis no LVO or aneurysm.  CT perfusion is negative.  Not a candidate for thrombolytics due to Eliquis use     Complex headache versus MRI negative stroke  -Migraine cocktail ordered:  magnesium 2 g IV daily, prednisone 20 mg daily, prochlorperazine 10 mg every 6 hours, diphenhydramine 12.5 mg every 6 hours, valproic acid 500 mg daily, pantoprazole, and IV fluids  -Can add ketorolac or gabapentin if patient's headache does not improve.  -Headache prevention with magnesium 400 mg daily and riboflavin 400 mg daily as outpatient  -Follow-up in headache clinic (added to ADT)  -PT/OT recommending IPR  -Normal blood pressure goals, management primary team, okay to restart home meds  -Case management and social work to see and follow for discharge needs    Chronic multifocal infarct  History of PE  -Continue home apixaban 5 mg twice daily  -Continue atorvastatin 80 mg daily        Discussed the importance of medication compliance Atorvastatin 80mg nightly and Eliquis 5mg twice daily and lifestyle modifications adequate control of blood pressure, adequate control of cholesterol (goal LDL <70), adequate control of glucose (<140, A1c goal <7), increased physical activity, and implementation of healthy diet to help reduce the risk of future cerebrovascular events.  Also discussed the signs symptoms that would warrant the  patient return back to the emergency department including unilateral weakness, unilateral numbness, visual disturbances, loss of balance, speech difficulties, and/or a sudden severe headache.  Patient verbalized understanding.     Care discussed with patient.  Stroke neurology will sign off at this time.  Please call with any questions or concerns.     JOSE Chavez  [facility] [place], [place] STROKE [facility]URO  25  07:53 EDT      Electronically signed by Sheron Paz APRN at 25 0916          Physical Therapy Notes (most recent note)        Estelle Rangel, PT at 25 0909  Version 1 of 1         Patient Name: Rehana Hamilton  : 1970    MRN: [id number]                              Today's Date: 3/30/2025       Admit Date: 3/29/2025    Visit Dx:     ICD-10-CM ICD-9-CM   1. Acute left-sided weakness  R53.1 728.87   2. Lethargy  R53.83 780.79     Patient Active Problem List   Diagnosis    Allergic rhinitis    GERD (gastroesophageal reflux disease)    Hypertension    Migraine    Sleep apnea    Rheumatoid arthritis    Type 2 diabetes mellitus    Congestive heart failure    Chronic obstructive lung disease    Colon cancer    Hypothyroidism    Encounter for colonoscopy due to history of colon cancer    Gastroesophageal reflux disease    Personal history of colon cancer    Abdominal pain    Acute pulmonary edema    Blood in feces    Chronic nausea    Esophagitis, reflux    Medically noncompliant    Pedal edema    SOB (shortness of breath)    Pulmonary embolism with infarction    Cancer of transverse colon    Hypertension    TIA (transient ischemic attack)     Past Medical History:   Diagnosis Date    Allergic rhinitis     Cancer of transverse colon     Personal history of colon cancer    Colon cancer 10/11/2016    Congestive heart failure     COPD (chronic obstructive pulmonary disease)     Depression     Disease of thyroid gland     Esophagitis     GERD (gastroesophageal reflux disease)     History of degenerative  "disc disease     History of migraine     History of nuclear stress test 2017    \"IT WAS NORMAL\"    History of sleep apnea     NO CPAP    Hypertension     Personal history of congestive heart failure     PONV (postoperative nausea and vomiting)     Rheumatoid arthritis     Tattoo     X5    Type 2 diabetes mellitus      Past Surgical History:   Procedure Laterality Date    CHOLECYSTECTOMY      COLON RESECTION  05/01/2014    SECONDARY TO COLON CANCER    COLONOSCOPY N/A 10/2/2017    Procedure: DIAGNOSTIC COLONOSCOPY;  Surgeon: Elina Summers MD;  Location: [facility] ENDOSCOPY;  Service:     COLONOSCOPY N/A 12/7/2018    Procedure: COLONOSCOPY;  Surgeon: Elina Summers MD;  Location: [facility] ENDOSCOPY;  Service: Gastroenterology    COLONOSCOPY N/A 9/11/2020    Procedure: COLONOSCOPY W/ COLD SNARE POLYPECTOMY;  Surgeon: Elina Summers MD;  Location: [facility] ENDOSCOPY;  Service: Gastroenterology;  Laterality: N/A;    COLONOSCOPY W/ BIOPSIES      COLONOSCOPY W/ POLYPECTOMY      DILATION AND CURETTAGE, DIAGNOSTIC / THERAPEUTIC      ENDOSCOPY N/A 10/2/2017    Procedure: ESOPHAGOGASTRODUODENOSCOPY WITH BIOPSY;  Surgeon: Elina Summers MD;  Location: [facility] ENDOSCOPY;  Service:     HYSTERECTOMY      Total Abdominal Hysterectomy with Removal of Both Ovaries    TUBAL ABDOMINAL LIGATION      UPPER GASTROINTESTINAL ENDOSCOPY      Diagnostic      General Information       Row Name 03/30/25 1010          Physical Therapy Time and Intention    Document Type evaluation  -SS     Mode of Treatment physical therapy  -       Row Name 03/30/25 1010          General Information    Patient Profile Reviewed yes  -SS     Prior Level of Function independent:;all household mobility;gait;transfer;bed mobility  may need clarification; pt. does report she walks around the house with rollator, declines any acute falls  -SS     Existing Precautions/Restrictions fall;other (see comments)  L sided weakness, L visual deficit "  -     Barriers to Rehab medically complex;previous functional deficit  -       Row Name 03/30/25 1010          Living Environment    Current Living Arrangements home  -     People in Home spouse  -       Row Name 03/30/25 1010          Home Main Entrance    Number of Stairs, Main Entrance two  -SS     Stair Railings, Main Entrance railing on right side (ascending)  -       Row Name 03/30/25 1010          Stairs Within Home, Primary    Number of Stairs, Within Home, Primary twelve  -SS     Stair Railings, Within Home, Primary railing on right side (ascending)  -       Row Name 03/30/25 1010          Cognition    Orientation Status (Cognition) oriented x 3;other (see comments)  increased time to answer  -       Row Name 03/30/25 1010          Safety Issues/Impairments Affecting Functional Mobility    Safety Issues Affecting Function (Mobility) awareness of need for assistance;insight into deficits/self-awareness;judgment;safety precautions follow-through/compliance;problem-solving;safety precaution awareness;sequencing abilities  -     Impairments Affecting Function (Mobility) balance;coordination;endurance/activity tolerance;motor planning;motor control;muscle tone abnormal;postural/trunk control;sensation/sensory awareness;strength;visual/perceptual  -               User Key  (r) = Recorded By, (t) = Taken By, (c) = Cosigned By      Initials Name Provider Type     Estelle Rangel PT Physical Therapist                   Mobility       Row Name 03/30/25 1015          Bed Mobility    Bed Mobility scooting/bridging;supine-sit  -     Scooting/Bridging [place] (Bed Mobility) moderate assist (50% patient effort);2 person assist;verbal cues;nonverbal cues (demo/gesture)  -     Supine-Sit [place] (Bed Mobility) moderate assist (50% patient effort);2 person assist;verbal cues;nonverbal cues (demo/gesture)  -     Assistive Device (Bed Mobility) bed rails;head of bed elevated;repositioning  sheet  -SS     Comment, (Bed Mobility) V/TC for sequencing  -SS       Row Name 03/30/25 1015          Bed-Chair Transfer    Bed-Chair [place] (Transfers) moderate assist (50% patient effort);2 person assist;verbal cues;nonverbal cues (demo/gesture)  -     Assistive Device (Bed-Chair Transfers) other (see comments)  BUE support  -SS     Comment, (Bed-Chair Transfer) V/TC for sequencing, L knee blocked, did demonstrate buckling on 3rd step  -SS       Row Name 03/30/25 1015          Sit-Stand Transfer    Sit-Stand [place] (Transfers) moderate assist (50% patient effort);2 person assist;verbal cues;nonverbal cues (demo/gesture)  -     Assistive Device (Sit-Stand Transfers) other (see comments)  BUE support  -SS     Comment, (Sit-Stand Transfer) V/TC for LE set up, hand placement  -       Row Name 03/30/25 1015          Gait/Stairs (Locomotion)    [place] Level (Gait) unable to assess  -     Comment, (Gait/Stairs) deferred due to LLE weakness/buckling w/transfer; did take 3 steps to chair  -               User Key  (r) = Recorded By, (t) = Taken By, (c) = Cosigned By      Initials Name Provider Type     Estelle Rangel PT Physical Therapist                   Obj/Interventions       Row Name 03/30/25 1135          Range of Motion Comprehensive    General Range of Motion bilateral lower extremity ROM WFL  -     Comment, General Range of Motion AAROM WFL  -       Row Name 03/30/25 1135          Strength Comprehensive (MMT)    Comment, General Manual Muscle Testing (MMT) Assessment RLE gross 3+/5, LLE gross 3/5;  -       Row Name 03/30/25 1135          Motor Skills    Motor Skills coordination  -     Coordination gross motor deficit;left;lower extremity;minimal impairment  -       Row Name 03/30/25 1135          Balance    Balance Assessment sitting static balance;sitting dynamic balance;standing dynamic balance;standing static balance  -     Static Sitting Balance contact guard  -      Dynamic Sitting Balance contact guard  -SS     Position, Sitting Balance unsupported;sitting edge of bed  -SS     Static Standing Balance moderate assist  -SS     Dynamic Standing Balance moderate assist;2-person assist  -SS     Position/Device Used, Standing Balance supported;other (see comments)  BUE support  -SS     Balance Interventions sitting;standing;sit to stand;supported;static;dynamic  -       Row Name 03/30/25 1135          Sensory Assessment (Somatosensory)    Sensory Assessment (Somatosensory) LE sensation intact  RLE intact; LLE intact but greatly diminished  -               User Key  (r) = Recorded By, (t) = Taken By, (c) = Cosigned By      Initials Name Provider Type     Estelle Rangel, PT Physical Therapist                   Goals/Plan       Row Name 03/30/25 1142          Bed Mobility Goal 1 (PT)    Activity/Assistive Device (Bed Mobility Goal 1, PT) bed mobility activities, all  -SS     [place] Level/Cues Needed (Bed Mobility Goal 1, PT) minimum assist (75% or more patient effort)  -SS     Time Frame (Bed Mobility Goal 1, PT) short term goal (STG);5 days  -       Row Name 03/30/25 1142          Transfer Goal 1 (PT)    Activity/Assistive Device (Transfer Goal 1, PT) sit-to-stand/stand-to-sit;bed-to-chair/chair-to-bed  -SS     [place] Level/Cues Needed (Transfer Goal 1, PT) minimum assist (75% or more patient effort)  -SS     Time Frame (Transfer Goal 1, PT) long term goal (LTG);10 days  -       Row Name 03/30/25 1142          Gait Training Goal 1 (PT)    Activity/Assistive Device (Gait Training Goal 1, PT) gait (walking locomotion);assistive device use;walker, rolling  -SS     [place] Level (Gait Training Goal 1, PT) moderate assist (50-74% patient effort)  -SS     Distance (Gait Training Goal 1, PT) 15  -SS     Time Frame (Gait Training Goal 1, PT) long term goal (LTG);10 days  -       Row Name 03/30/25 1142          Balance Goal 1 (PT)    Activity/Assistive Device  (Balance Goal) standing static balance;standing dynamic balance  -     [place] Level/Cues Needed (Balance Goal 1, PT) contact guard required  -     Time Frame (Balance Goal 1, PT) long-term goal (LTG);1 week  -       Row Name 03/30/25 1142          Therapy Assessment/Plan (PT)    Planned Therapy Interventions (PT) balance training;bed mobility training;gait training;home exercise program;motor coordination training;neuromuscular re-education;patient/family education;postural re-education;ROM (range of motion);strengthening;stretching;transfer training  -               User Key  (r) = Recorded By, (t) = Taken By, (c) = Cosigned By      Initials Name Provider Type     Estelle Rangel, PT Physical Therapist                   Clinical Impression       Row Name 03/30/25 1138          Pain    Pretreatment Pain Rating 0/10 - no pain  -     Posttreatment Pain Rating 0/10 - no pain  -       Row Name 03/30/25 1134          Plan of Care Review    Plan of Care Reviewed With patient  -SS     Outcome Evaluation Pt. presents below baseline function w/L sided weakness, balance deficits and decreased functional endurance affecting his ability to safely participate in functional mobility. She performed bed mobility and transfers w/mod assist of 2. Activity limited by L sided weakness, buckling. Pt. requires increased time for activity. Pt. would benefit from acute PT services to address stated deficits.  -       Row Name 03/30/25 3724          Therapy Assessment/Plan (PT)    Patient/Family Therapy Goals Statement (PT) to return to PLOF  -     Rehab Potential (PT) good  -SS     Criteria for Skilled Interventions Met (PT) yes;meets criteria;skilled treatment is necessary  -     Therapy Frequency (PT) daily  -     Predicted Duration of Therapy Intervention (PT) 10 days  -       Row Name 03/30/25 1134          Vital Signs    Pre Systolic BP Rehab 188  -SS     Pre Treatment Diastolic BP 73  -SS     Post  Systolic BP Rehab 195  RN present/aware  -SS     Post Treatment Diastolic BP 81  -SS     Pretreatment Heart Rate (beats/min) 53  -SS     Posttreatment Heart Rate (beats/min) 47  -SS     Pre SpO2 (%) 100  -SS     O2 Delivery Pre Treatment room air  -SS     Post SpO2 (%) 100  -SS     O2 Delivery Post Treatment room air  -SS     Pre Patient Position Supine  -SS     Post Patient Position Sitting  -SS       Row Name 03/30/25 1138          Positioning and Restraints    Pre-Treatment Position in bed  -SS     Post Treatment Position chair  -SS     In Chair notified nsg;reclined;call light within reach;encouraged to call for assist;exit alarm on;waffle cushion;on mechanical lift sling;legs elevated;with nsg  -SS               User Key  (r) = Recorded By, (t) = Taken By, (c) = Cosigned By      Initials Name Provider Type    SS Estelle Rangel, PT Physical Therapist                   Outcome Measures       Row Name 03/30/25 1143          How much help from another person do you currently need...    Turning from your back to your side while in flat bed without using bedrails? 2  -SS     Moving from lying on back to sitting on the side of a flat bed without bedrails? 2  -SS     Moving to and from a bed to a chair (including a wheelchair)? 2  -SS     Standing up from a chair using your arms (e.g., wheelchair, bedside chair)? 2  -SS     Climbing 3-5 steps with a railing? 1  -SS     To walk in hospital room? 2  -SS     AM-PAC 6 Clicks Score (PT) 11  -SS     Highest Level of Mobility Goal 4 --> Transfer to chair/commode  -       Row Name 03/30/25 1143 03/30/25 1141       Modified Sweta Scale    Pre-Stroke Modified Barnes Scale 6 - Unable to determine (UTD) from the medical record documentation  -SS 6 - Unable to determine (UTD) from the medical record documentation  -AR    Modified Sweta Scale 4 - Moderately severe disability.  Unable to walk without assistance, and unable to attend to own bodily needs without assistance.  -SS 4  - Moderately severe disability.  Unable to walk without assistance, and unable to attend to own bodily needs without assistance.  -AR      Row Name 03/30/25 1143 03/30/25 1141       Functional Assessment    Outcome Measure Options AM-PAC 6 Clicks Basic Mobility (PT);Modified Sweta  - AM-PAC 6 Clicks Daily Activity (OT);Modified [place]  -AR              User Key  (r) = Recorded By, (t) = Taken By, (c) = Cosigned By      Initials Name Provider Type    Charissa Garcia, OT Occupational Therapist    Estelle Hart, PT Physical Therapist                                 Physical Therapy Education       Title: PT OT SLP Therapies (In Progress)       Topic: Physical Therapy (In Progress)       Point: Mobility training (Done)       Learning Progress Summary            Patient ESTELA Spears VU, DU,NR by  at 3/30/2025 1144    Comment: Educated pt. safety/technique w/bed mobility, transfers, PT POC                      Point: Home exercise program (Not Started)       Learner Progress:  Not documented in this visit.              Point: Body mechanics (Done)       Learning Progress Summary            Patient ESTELA Spears VU, DU,NR by  at 3/30/2025 1144    Comment: Educated pt. safety/technique w/bed mobility, transfers, PT POC                      Point: Precautions (Done)       Learning Progress Summary            Patient ESTELA Spears VU, DU,NR by  at 3/30/2025 1144    Comment: Educated pt. safety/technique w/bed mobility, transfers, PT POC                                      User Key       Initials Effective Dates Name Provider Type [facility] 06/01/21 -  Estelle Rangel, PT Physical Therapist PT                  PT Recommendation and Plan  Planned Therapy Interventions (PT): balance training, bed mobility training, gait training, home exercise program, motor coordination training, neuromuscular re-education, patient/family education, postural re-education, ROM (range of motion), strengthening, stretching, transfer  training  Outcome Evaluation: Pt. presents below baseline function w/L sided weakness, balance deficits and decreased functional endurance affecting his ability to safely participate in functional mobility. She performed bed mobility and transfers w/mod assist of 2. Activity limited by L sided weakness, buckling. Pt. requires increased time for activity. Pt. would benefit from acute PT services to address stated deficits.     Time Calculation:   PT Evaluation Complexity  History, PT Evaluation Complexity: 3 or more personal factors and/or comorbidities  Examination of Body Systems (PT Eval Complexity): total of 4 or more elements  Clinical Presentation (PT Evaluation Complexity): evolving  Clinical Decision Making (PT Evaluation Complexity): moderate complexity  Overall Complexity (PT Evaluation Complexity): moderate complexity     PT Charges       Row Name 03/30/25 1144             Time Calculation    Start Time 0909  -SS      PT Received On 03/30/25  -SS      PT Goal Re-Cert Due Date 04/09/25  -SS         Timed Charges    59440 - PT Therapeutic Activity Minutes 10  -SS         Untimed Charges    PT Eval/Re-eval Minutes 53  -SS         Total Minutes    Timed Charges Total Minutes 10  -SS      Untimed Charges Total Minutes 53  -SS       Total Minutes 63  -SS                User Key  (r) = Recorded By, (t) = Taken By, (c) = Cosigned By      Initials Name Provider Type    SS Estelle Rangel, PT Physical Therapist                  Therapy Charges for Today       Code Description Service Date Service Provider Modifiers Qty    54437292539 HC PT THERAPEUTIC ACT EA 15 MIN 3/30/2025 Estelle Rangel, PT GP 1    21576864771 HC PT EVAL MOD COMPLEXITY 4 3/30/2025 Estelle Rangel, PT GP 1            PT G-Codes  Outcome Measure Options: AM-PAC 6 Clicks Basic Mobility (PT), Modified Sweta  AM-PAC 6 Clicks Score (PT): 11  AM-PAC 6 Clicks Score (OT): 13  Modified Sweta Scale: 4 - Moderately severe disability.  Unable to walk without  "assistance, and unable to attend to own bodily needs without assistance.  PT Discharge Summary  Anticipated Discharge Disposition (PT): inpatient rehabilitation facility    Estelle Rangel, PT  3/30/2025      Electronically signed by Estelle Rangel, PT at 25 1145          Occupational Therapy Notes (most recent note)        Charissa Crawford, OT at 25 1142          Patient Name: Rehana Hamilton  : 1970    MRN: [id number]                              Today's Date: 3/30/2025       Admit Date: 3/29/2025    Visit Dx:     ICD-10-CM ICD-9-CM   1. Acute left-sided weakness  R53.1 728.87   2. Lethargy  R53.83 780.79     Patient Active Problem List   Diagnosis    Allergic rhinitis    GERD (gastroesophageal reflux disease)    Hypertension    Migraine    Sleep apnea    Rheumatoid arthritis    Type 2 diabetes mellitus    Congestive heart failure    Chronic obstructive lung disease    Colon cancer    Hypothyroidism    Encounter for colonoscopy due to history of colon cancer    Gastroesophageal reflux disease    Personal history of colon cancer    Abdominal pain    Acute pulmonary edema    Blood in feces    Chronic nausea    Esophagitis, reflux    Medically noncompliant    Pedal edema    SOB (shortness of breath)    Pulmonary embolism with infarction    Cancer of transverse colon    Hypertension    TIA (transient ischemic attack)     Past Medical History:   Diagnosis Date    Allergic rhinitis     Cancer of transverse colon     Personal history of colon cancer    Colon cancer 10/11/2016    Congestive heart failure     COPD (chronic obstructive pulmonary disease)     Depression     Disease of thyroid gland     Esophagitis     GERD (gastroesophageal reflux disease)     History of degenerative disc disease     History of migraine     History of nuclear stress test 2017    \"IT WAS NORMAL\"    History of sleep apnea     NO CPAP    Hypertension     Personal history of congestive heart failure     PONV " (postoperative nausea and vomiting)     Rheumatoid arthritis     Tattoo     X5    Type 2 diabetes mellitus      Past Surgical History:   Procedure Laterality Date    CHOLECYSTECTOMY      COLON RESECTION  05/01/2014    SECONDARY TO COLON CANCER    COLONOSCOPY N/A 10/2/2017    Procedure: DIAGNOSTIC COLONOSCOPY;  Surgeon: Elina Summers MD;  Location: [facility] ENDOSCOPY;  Service:     COLONOSCOPY N/A 12/7/2018    Procedure: COLONOSCOPY;  Surgeon: Elina Summers MD;  Location: [facility] ENDOSCOPY;  Service: Gastroenterology    COLONOSCOPY N/A 9/11/2020    Procedure: COLONOSCOPY W/ COLD SNARE POLYPECTOMY;  Surgeon: Elina Summers MD;  Location: [facility] ENDOSCOPY;  Service: Gastroenterology;  Laterality: N/A;    COLONOSCOPY W/ BIOPSIES      COLONOSCOPY W/ POLYPECTOMY      DILATION AND CURETTAGE, DIAGNOSTIC / THERAPEUTIC      ENDOSCOPY N/A 10/2/2017    Procedure: ESOPHAGOGASTRODUODENOSCOPY WITH BIOPSY;  Surgeon: Elina Summers MD;  Location: [facility] ENDOSCOPY;  Service:     HYSTERECTOMY      Total Abdominal Hysterectomy with Removal of Both Ovaries    TUBAL ABDOMINAL LIGATION      UPPER GASTROINTESTINAL ENDOSCOPY      Diagnostic      General Information       Row Name 03/30/25 1129          OT Time and Intention    Document Type evaluation  -AR     Mode of Treatment occupational therapy;co-treatment  -AR       Row Name 03/30/25 1129          General Information    Patient Profile Reviewed yes  -AR     Prior Level of Function independent:;all household mobility;gait;transfer;ADL's  uses rollator, sleeps in recliner  -AR     Existing Precautions/Restrictions fall  L sided hemiparesis  -AR     Barriers to Rehab visual deficit;medically complex;previous functional deficit  -AR       Row Name 03/30/25 1129          Living Environment    Current Living Arrangements home  -AR     People in Home spouse  -AR       Row Name 03/30/25 1129          Home Main Entrance    Number of Stairs, Main Entrance two   -AR     Stair Railings, Main Entrance railing on right side (ascending)  -AR       Row Name 03/30/25 1129          Stairs Within Home, Primary    Number of Stairs, Within Home, Primary twelve  -AR     Stair Railings, Within Home, Primary railing on right side (ascending)  -AR       Row Name 03/30/25 1129          Cognition    Orientation Status (Cognition) oriented x 3;other (see comments)  -AR       Row Name 03/30/25 1129          Safety Issues/Impairments Affecting Functional Mobility    Safety Issues Affecting Function (Mobility) awareness of need for assistance;insight into deficits/self-awareness;judgment;problem-solving;safety precaution awareness;safety precautions follow-through/compliance;sequencing abilities  -AR     Impairments Affecting Function (Mobility) balance;coordination;endurance/activity tolerance;motor planning;motor control;muscle tone abnormal;postural/trunk control;sensation/sensory awareness;strength;visual/perceptual;grasp;range of motion (ROM)  -AR               User Key  (r) = Recorded By, (t) = Taken By, (c) = Cosigned By      Initials Name Provider Type    AR Charissa Crawford OT Occupational Therapist                     Mobility/ADL's       Row Name 03/30/25 1131          Bed Mobility    Bed Mobility scooting/bridging;supine-sit  -AR     Scooting/Bridging [place] (Bed Mobility) moderate assist (50% patient effort);2 person assist;verbal cues;nonverbal cues (demo/gesture)  -AR     Supine-Sit [place] (Bed Mobility) moderate assist (50% patient effort);2 person assist;verbal cues;nonverbal cues (demo/gesture)  -AR     Assistive Device (Bed Mobility) bed rails;head of bed elevated;repositioning sheet  -AR     Comment, (Bed Mobility) Increased assist to advance LLE  -AR       Row Name 03/30/25 1131          Transfers    Transfers sit-stand transfer;stand-sit transfer;bed-chair transfer  -AR     Comment, (Transfers) Cues for hand placement and chair approach, mild L knee  buckling noted  -AR       Row Name 03/30/25 1131          Bed-Chair Transfer    Bed-Chair [place] (Transfers) moderate assist (50% patient effort);2 person assist;verbal cues  -AR     Assistive Device (Bed-Chair Transfers) other (see comments)  BUE HHA  -AR       Row Name 03/30/25 1131          Sit-Stand Transfer    Sit-Stand [place] (Transfers) moderate assist (50% patient effort);2 person assist;verbal cues  -AR     Assistive Device (Sit-Stand Transfers) other (see comments)  -AR       Row Name 03/30/25 1131          Stand-Sit Transfer    Stand-Sit [place] (Transfers) moderate assist (50% patient effort);2 person assist;verbal cues  -AR       Row Name 03/30/25 1131          Activities of Daily Living    BADL Assessment/Intervention upper body dressing;lower body dressing;feeding  -AR       Row Name 03/30/25 1131          Lower Body Dressing Assessment/Training    [place] Level (Lower Body Dressing) don;socks;dependent (less than 25% patient effort)  -AR     Position (Lower Body Dressing) supine  -AR       Row Name 03/30/25 1131          Self-Feeding Assessment/Training    [place] Level (Feeding) liquids to mouth;supervision  -AR     Position (Feeding) supine  -AR               User Key  (r) = Recorded By, (t) = Taken By, (c) = Cosigned By      Initials Name Provider Type    Charissa Garcia, OT Occupational Therapist                   Obj/Interventions       Row Name 03/30/25 1132          Sensory Assessment (Somatosensory)    Sensory Assessment (Somatosensory) left UE  -AR     Sensory Subjective Reports numbness  -AR       Row Name 03/30/25 1132          Vision Assessment/Intervention    Visual Motor Impairment visual tracking, left  -AR     Visual Processing Deficit chirag-inattention/neglect, left  -AR       Row Name 03/30/25 1132          Range of Motion Comprehensive    General Range of Motion upper extremity range of motion deficits identified  -AR     Comment, General Range of  Edson STERLING WFL  -AR       Row Name 03/30/25 1132          Strength Comprehensive (MMT)    Comment, General Manual Muscle Testing (MMT) Assessment R shoulder 3-/5, remainder 3/5; L shoulder 2+/5, elbow 3-/5, FA/wrist/hand 3-/5  -AR       Row Name 03/30/25 1132          Motor Skills    Motor Skills coordination  -AR     Coordination left;moderate impairment;fine motor deficit;other (see comments)  opposition deficit 1 inch  -AR       Row Name 03/30/25 1132          Balance    Balance Assessment sitting static balance;sitting dynamic balance;standing static balance;standing dynamic balance  -AR     Static Sitting Balance contact guard  -AR     Dynamic Sitting Balance contact guard  -AR     Position, Sitting Balance unsupported;sitting edge of bed  -AR     Static Standing Balance moderate assist;1-person assist  -AR     Dynamic Standing Balance moderate assist;2-person assist  -AR     Position/Device Used, Standing Balance supported  -AR               User Key  (r) = Recorded By, (t) = Taken By, (c) = Cosigned By      Initials Name Provider Type    Charissa Garcia, [facility] Occupational Therapist                   Goals/Plan       Row Name 03/30/25 1139          Transfer Goal 1 (OT)    Activity/Assistive Device (Transfer Goal 1, OT) sit-to-stand/stand-to-sit;commode, bedside without drop arms;walker, rolling  -AR     Major Level/Cues Needed (Transfer Goal 1, OT) minimum assist (75% or more patient effort);verbal cues required  -AR     Time Frame (Transfer Goal 1, OT) long term goal (LTG);10 days  -AR     Progress/Outcome (Transfer Goal 1, OT) goal ongoing  -AR       Row Name 03/30/25 1139          Dressing Goal 1 (OT)    Activity/Device (Dressing Goal 1, OT) upper body dressing  -AR     Major/Cues Needed (Dressing Goal 1, OT) minimum assist (75% or more patient effort);verbal cues required  -AR     Time Frame (Dressing Goal 1, OT) short term goal (STG);5 days  -AR     Progress/Outcome (Dressing Goal  1, OT) goal ongoing  -AR       Row Name 03/30/25 1139          Self-Feeding Goal 1 (OT)    Activity/Device (Self-Feeding Goal 1, OT) self-feeding skills, all  AU PRN  -AR     [place] Level/Cues Needed (Self-Feeding Goal 1, OT) verbal cues required;supervision required  -AR     Time Frame (Self-Feeding Goal 1, OT) short term goal (STG);5 days  -AR     Progress/Outcomes (Self-Feeding Goal 1, OT) goal ongoing  -AR       Row Name 03/30/25 1139          Problem Specific Goal 1 (OT)    Problem Specific Goal 1 (OT) Pt will locate ADL items placed L of midline during grooming tasks with max 3 vc  -AR     Time Frame (Problem Specific Goal 1, OT) long term goal (LTG);10 days  -AR     Progress/Outcome (Problem Specific Goal 1, OT) goal ongoing  -AR       Row Name 03/30/25 1139          Therapy Assessment/Plan (OT)    Planned Therapy Interventions (OT) activity tolerance training;adaptive equipment training;BADL retraining;functional balance retraining;IADL retraining;neuromuscular control/coordination retraining;occupation/activity based interventions;cognitive/visual perception retraining;patient/caregiver education/training;ROM/therapeutic exercise;strengthening exercise;transfer/mobility retraining  -AR               User Key  (r) = Recorded By, (t) = Taken By, (c) = Cosigned By      Initials Name Provider Type    AR Charissa Crawford, OT Occupational Therapist                   Clinical Impression       Row Name 03/30/25 1134          Pain Assessment    Pain Management Interventions activity modification encouraged;exercise or physical activity utilized;positioning techniques utilized  -AR     Response to Pain Interventions activity participation with tolerable pain  -AR     Additional Documentation Pain Scale: FACES Pre/Post-Treatment (Group)  -AR       Row Name 03/30/25 1131          Pain Scale: FACES Pre/Post-Treatment    Pain: FACES Scale, Pretreatment 2-->hurts little bit  -AR     Posttreatment Pain Rating  2-->hurts little bit  -AR       Row Name 03/30/25 1134          Plan of Care Review    Plan of Care Reviewed With patient  -AR     Outcome Evaluation Pt Ox4 with increased processing time. She completed bed mobility with mod assist x2, LB dressing with dependence and transfer to chair with mod assist x2. She presents with acute-on-chronic L sided hemiparesis, delayed processing, generalized weakness, impaired balance, visual deficit, bradycardia, hypertension and is performing below baseline. Recommed IPR if medically appropriate.  -AR       Row Name 03/30/25 1134          Therapy Assessment/Plan (OT)    Rehab Potential (OT) good  -AR     Criteria for Skilled Therapeutic Interventions Met (OT) yes  -AR     Therapy Frequency (OT) daily  -AR       Row Name 03/30/25 1134          Therapy Plan Review/Discharge Plan (OT)    Anticipated Discharge Disposition (OT) inpatient rehabilitation facility  -AR       Row Name 03/30/25 1134          Vital Signs    Pre Systolic BP Rehab 188  -AR     Pre Treatment Diastolic BP 73  -AR     Post Systolic BP Rehab 195  -AR     Post Treatment Diastolic BP 81  RN at bedside  -AR     Pretreatment Heart Rate (beats/min) 62  -AR     Intratreatment Heart Rate (beats/min) 48  -AR     Posttreatment Heart Rate (beats/min) 51  -AR     Pre SpO2 (%) 100  -AR     O2 Delivery Pre Treatment room air  -AR     Post SpO2 (%) 99  -AR     O2 Delivery Post Treatment room air  -AR     Pre Patient Position Supine  -AR     Intra Patient Position Standing  -AR     Post Patient Position Sitting  -AR       Row Name 03/30/25 1134          Positioning and Restraints    Pre-Treatment Position in bed  -AR     Post Treatment Position chair  -AR     In Chair reclined;call light within reach;encouraged to call for assist;exit alarm on;with nsg;LUE elevated;waffle cushion;on mechanical lift sling;legs elevated  no SCD pump in room  -AR               User Key  (r) = Recorded By, (t) = Taken By, (c) = Cosigned By       Initials Name Provider Type    Charissa Garcia OT Occupational Therapist                   Outcome Measures       Row Name 03/30/25 1141          How much help from another is currently needed...    Putting on and taking off regular lower body clothing? 1  -AR     Bathing (including washing, rinsing, and drying) 2  -AR     Toileting (which includes using toilet bed pan or urinal) 2  -AR     Putting on and taking off regular upper body clothing 2  -AR     Taking care of personal grooming (such as brushing teeth) 3  -AR     Eating meals 3  -AR     AM-PAC 6 Clicks Score (OT) 13  -AR       Row Name 03/30/25 1141          Modified [place] Scale    Pre-Stroke Modified [place] Scale 6 - Unable to determine (UTD) from the medical record documentation  -AR     Modified [place] Scale 4 - Moderately severe disability.  Unable to walk without assistance, and unable to attend to own bodily needs without assistance.  -AR       Row Name 03/30/25 1141          Functional Assessment    Outcome Measure Options AM-PAC 6 Clicks Daily Activity (OT);Modified [place]  -AR               User Key  (r) = Recorded By, (t) = Taken By, (c) = Cosigned By      Initials Name Provider Type    Charissa Garcia OT Occupational Therapist                    Occupational Therapy Education       Title: PT OT SLP Therapies (Done)       Topic: Occupational Therapy (Done)       Point: ADL training (Done)       Learning Progress Summary            Patient Acceptance, E,D, VU by AR at 3/30/2025 1141                      Point: Home exercise program (Done)       Learning Progress Summary            Patient Acceptance, E,D, VU by AR at 3/30/2025 1141                      Point: Precautions (Done)       Learning Progress Summary            Patient Acceptance, E,D, VU by AR at 3/30/2025 1141                      Point: Body mechanics (Done)       Learning Progress Summary            Patient Acceptance, E,D, VU by AR at 3/30/2025 1141                                       User Key       Initials Effective Dates Name Provider Type Discipline    AR 07/11/23 -  Charissa Crawford, [facility] Occupational Therapist OT                  OT Recommendation and Plan  Planned Therapy Interventions (OT): activity tolerance training, adaptive equipment training, BADL retraining, functional balance retraining, IADL retraining, neuromuscular control/coordination retraining, occupation/activity based interventions, cognitive/visual perception retraining, patient/caregiver education/training, ROM/therapeutic exercise, strengthening exercise, transfer/mobility retraining  Therapy Frequency (OT): daily  Plan of Care Review  Plan of Care Reviewed With: patient  Outcome Evaluation: Pt Ox4 with increased processing time. She completed bed mobility with mod assist x2, LB dressing with dependence and transfer to chair with mod assist x2. She presents with acute-on-chronic L sided hemiparesis, delayed processing, generalized weakness, impaired balance, visual deficit, bradycardia, hypertension and is performing below baseline. Recommed IPR if medically appropriate.     Time Calculation:   Evaluation Complexity (OT)  Review Occupational Profile/Medical/Therapy History Complexity: brief/low complexity  Assessment, Occupational Performance/Identification of Deficit Complexity: 1-3 performance deficits  Clinical Decision Making Complexity (OT): problem focused assessment/low complexity  Overall Complexity of Evaluation (OT): low complexity     Time Calculation- OT       Row Name 03/30/25 1142             Time Calculation- OT    OT Start Time 0916  -AR      OT Received On 03/30/25  -AR      OT Goal Re-Cert Due Date 04/09/25  -AR         Timed Charges    75553 - OT Therapeutic Activity Minutes 10  -AR         Untimed Charges    OT Eval/Re-eval Minutes 60  -AR         Total Minutes    Timed Charges Total Minutes 10  -AR      Untimed Charges Total Minutes 60  -AR       Total Minutes 70  -AR                 User Key  (r) = Recorded By, (t) = Taken By, (c) = Cosigned By      Initials Name Provider Type    AR Charissa Crawford OT Occupational Therapist                  Therapy Charges for Today       Code Description Service Date Service Provider Modifiers Qty    85723653325 HC OT THERAPEUTIC ACT EA 15 MIN 3/30/2025 Charissa Crawford OT GO 1    25053385103 HC OT EVAL LOW COMPLEXITY 4 3/30/2025 Charissa Crawford OT GO 1                 Charissa Crawford OT  3/30/2025    Electronically signed by Charissa Crawford OT at 25 1143          Speech Language Pathology Notes (most recent note)        Sebastian Stevenson MS CCC-SLP at 25 1153          Acute Care - Speech Language Pathology Initial Evaluation  [facility]  Cognitive-Communication Evaluation\     Patient Name: Rehana Hamilton  : 1970  MRN: [id number]  Today's Date: 3/31/2025               Admit Date: 3/29/2025     Visit Dx:    ICD-10-CM ICD-9-CM   1. Acute left-sided weakness  R53.1 728.87   2. Lethargy  R53.83 780.79   3. Headache, variant migraine  G43.809 346.20   4. Dysarthria  R47.1 784.51     Patient Active Problem List   Diagnosis    Allergic rhinitis    GERD (gastroesophageal reflux disease)    Hypertension    Migraine    Sleep apnea    Rheumatoid arthritis    Type 2 diabetes mellitus    Congestive heart failure    Chronic obstructive lung disease    Colon cancer    Hypothyroidism    Encounter for colonoscopy due to history of colon cancer    Gastroesophageal reflux disease    Personal history of colon cancer    Abdominal pain    Acute pulmonary edema    Blood in feces    Chronic nausea    Esophagitis, reflux    Medically noncompliant    Pedal edema    SOB (shortness of breath)    Pulmonary embolism with infarction    Cancer of transverse colon    Hypertension    TIA (transient ischemic attack)     Past Medical History:   Diagnosis Date    Allergic rhinitis     Cancer of transverse colon     Personal history of colon cancer  "   Colon cancer 10/11/2016    Congestive heart failure     COPD (chronic obstructive pulmonary disease)     Depression     Disease of thyroid gland     Esophagitis     GERD (gastroesophageal reflux disease)     History of degenerative disc disease     History of migraine     History of nuclear stress test 2017    \"IT WAS NORMAL\"    History of sleep apnea     NO CPAP    Hypertension     Personal history of congestive heart failure     PONV (postoperative nausea and vomiting)     Rheumatoid arthritis     Tattoo     X5    Type 2 diabetes mellitus      Past Surgical History:   Procedure Laterality Date    CHOLECYSTECTOMY      COLON RESECTION  05/01/2014    SECONDARY TO COLON CANCER    COLONOSCOPY N/A 10/2/2017    Procedure: DIAGNOSTIC COLONOSCOPY;  Surgeon: Elina Summers MD;  Location: [facility] ENDOSCOPY;  Service:     COLONOSCOPY N/A 12/7/2018    Procedure: COLONOSCOPY;  Surgeon: Elina Summers MD;  Location: [facility] ENDOSCOPY;  Service: Gastroenterology    COLONOSCOPY N/A 9/11/2020    Procedure: COLONOSCOPY W/ COLD SNARE POLYPECTOMY;  Surgeon: Elina Summers MD;  Location: [facility] ENDOSCOPY;  Service: Gastroenterology;  Laterality: N/A;    COLONOSCOPY W/ BIOPSIES      COLONOSCOPY W/ POLYPECTOMY      DILATION AND CURETTAGE, DIAGNOSTIC / THERAPEUTIC      ENDOSCOPY N/A 10/2/2017    Procedure: ESOPHAGOGASTRODUODENOSCOPY WITH BIOPSY;  Surgeon: Elina Summers MD;  Location: [facility] ENDOSCOPY;  Service:     HYSTERECTOMY      Total Abdominal Hysterectomy with Removal of Both Ovaries    TUBAL ABDOMINAL LIGATION      UPPER GASTROINTESTINAL ENDOSCOPY      Diagnostic       SLP Recommendation and Plan  SLP Diagnosis: baseline, cognitive-linguistic disorder, dysarthria (03/31/25 1000)  SLP Diagnosis Comments: Pt's speech is slow but 100% intelligible in unknown context w unfamiliar listener. receptive and expressive language are intact and there are no word finding errors in conversation. Pt is " "tangential and topic maintenance is poor. After we discuss \"speech\" deficits, pt begins to have dysfluencies which had not been present in earlier conversation or during conversation w other staff observed. Pt is oriented. (03/31/25 1000)           [facility] Criteria for Skilled Therapy Interventions Met: no problems identified which require skilled intervention (03/31/25 1000)  Anticipated Discharge Disposition (SLP): No further SLP services warranted (03/31/25 1000)                                         Progress: no change (03/31/25 1153)      SLP EVALUATION (Last 72 Hours)       SLP SLC Evaluation       Row Name 03/31/25 1000                   Communication Assessment/Intervention    Document Type evaluation  -RS        Subjective Information no complaints  -RS        Patient Observations alert;cooperative  -RS        Patient/Family/Caregiver Comments/Observations none present  -RS           General Information    Patient Profile Reviewed yes  -RS        Pertinent History Of Current Problem Pt is a 54 yoF w PMHx B CVA 3/2024, PE, HTN, DM2, CHF, COPD, RA, SORAYA,colorectal CA s/p colectomy in 2022, and hypothyroidism who presented to [facility] 3/29 w headache, dysarthria, and worsening left-sided weakness. MRI is negative for acute stroke.  -RS        Precautions/Limitations, Vision WFL;for purposes of eval  -RS        Precautions/Limitations, Hearing WFL;for purposes of eval  -RS        Prior Level of Function-Communication cognitive-linguistic impairment;other (see comments)  since CVA 3/2024 per pt report  -RS        Plans/Goals Discussed with patient  -RS           Pain    Pretreatment Pain Rating 3/10  -RS        Posttreatment Pain Rating 3/10  -RS        Pain Location head  -RS           Comprehension Assessment/Intervention    Comprehension Assessment/Intervention Auditory Comprehension  -RS           Auditory Comprehension Assessment/Intervention    Auditory Comprehension (Communication) WFL  -RS           Expression " "Assessment/Intervention    Expression Assessment/Intervention verbal expression  -RS           Verbal Expression Assessment/Intervention    Verbal Expression WFL  -RS           Oral Motor Structure and Function    Oral Motor Structure and Function WFL  -RS        Dentition Assessment poor oral hygiene;missing teeth  -RS        Mucosal Quality moist, healthy  -RS           Oral Musculature and Cranial Nerve Assessment    Oral Motor General Assessment generalized oral motor weakness  -RS           Motor Speech Assessment/Intervention    Characteristics Consistent with Dysarthria decreased articulation  -RS        Conversational Speech (Communication) WFL  -RS        Speech intelligibility 100%;with unfamiliar listener  -RS        Motor Speech, Comment pt states that she is at her baseline  -RS           Cursory Voice Assessment/Intervention    Quality and Resonance (Voice) WFL  -RS           Cognitive Assessment Intervention- SLP    Orientation Status (Cognition) WFL  -RS        Attention (Cognitive) moderate impairment  -RS           SLP Evaluation Clinical Impressions    SLP Diagnosis baseline;cognitive-linguistic disorder;dysarthria  -RS        SLP Diagnosis Comments Pt's speech is slow but 100% intelligible in unknown context w unfamiliar listener. receptive and expressive language are intact and there are no word finding errors in conversation. Pt is tangential and topic maintenance is poor. After we discuss \"speech\" deficits, pt begins to have dysfluencies which had not been present in earlier conversation or during conversation w other staff observed. Pt is oriented.  -RS        SLC Criteria for Skilled Therapy Interventions Met no problems identified which require skilled intervention  -RS           Recommendations    Anticipated Discharge Disposition (SLP) No further SLP services warranted  -RS                  User Key  (r) = Recorded By, (t) = Taken By, (c) = Cosigned By      Initials Name Effective Dates "    Sebastian Zamora MS CCC-SLP 09/14/23 -                        EDUCATION  The patient has been educated in the following areas:     Communication Impairment.                        Time Calculation:      Time Calculation- SLP       Row Name 03/31/25 1150             Time Calculation- SLP    SLP Start Time 0915  -RS      SLP Received On 03/31/25  -RS         Untimed Charges    76817-EV Eval Speech and Production w/ Language Minutes 40  -RS         Total Minutes    Untimed Charges Total Minutes 40  -RS       Total Minutes 40  -RS                User Key  (r) = Recorded By, (t) = Taken By, (c) = Cosigned By      Initials Name Provider Type    Sebastian Zamora MS CCC-SLP Speech and Language Pathologist                    Therapy Charges for Today       Code Description Service Date Service Provider Modifiers Qty    00152308134 HC ST EVAL SPEECH AND PROD W LANG  3 3/31/2025 Sebastian Stevenson MS CCC-SLP GN 1                       MS SHABBIR Joy  3/31/2025    Electronically signed by Sebastian Stevenson MS CCC-SLP at 03/31/25 1159

## 2025-03-31 NOTE — CONSULTS
"Diabetes Education    Patient Name:  Rehana Hamilton  YOB: 1970  MRN: [id number]  Admit Date:  3/29/2025    Consult for diabetes education received per stroke protocol. Chart reviewed. Pt was seen at bedside today. Permission given for visit.     Patient's gcs charted 14 with slow speech but per chart review and  this is patient's baseline.    Patient's current A1C is 6.6%. Patient has had type 2 diabetes for 10 years. Home medications consist of jardiance. Patient declines taking metformin at this time. Patient does not have a working meter, states it has been broken for \"a while\". Sent message to provider for meter and testing supplies to be sent to meds to beds.    Discussed target range of  fasting and  postprandial and goal of A1C <7% to reduce complications like heart disease and stroke.    Provided patient with [facility]'s \"Stroke prevention\" handout for her  as well as our outpatient diabetes education office number. Patient declined the need to call her  at this time.    Patient does not qualify for the follow up stroke class based on the exclusion criteria of MRI negative for stroke per stroke notes.    Total time spent reviewing chart, preparing education/materials, providing education at bedside, and coordinating care approx 15 minutes.    Thank you for this consult.    Electronically signed by:  Leslie Wilson RN  03/31/25 15:38 EDT  "

## 2025-03-31 NOTE — PROGRESS NOTES
[facility] Medicine Services  PROGRESS NOTE    Patient Name: Rehana Hamilton  : 1970  MRN: [id number]    Date of Admission: 3/29/2025  Primary Care Physician: Kimberly Hughes MD    Subjective   Subjective     CC:  F/U L sided weakness, dysarthria     HPI:  Patient is a 53 yo F seen and examined by me this AM, she is resting in bed, she is awaiting for possible rehab at this time. No new acute complaints at this time.     Objective   Objective     Vital Signs:   Temp:  [97.7 °F (36.5 °C)-98.7 °F (37.1 °C)] 97.8 °F (36.6 °C)  Heart Rate:  [53-79] 79  Resp:  [16-18] 18  BP: (129-164)/(66-80) 157/75     Physical Exam:  Constitutional: No acute distress, awake, alert, chronically ill and frail appearing   HENT: NCAT, mucous membranes moist  Respiratory: Clear to auscultation bilaterally, respiratory effort normal RA  Cardiovascular: RRR, no murmurs, rubs, or gallops  Gastrointestinal: soft, nontender, non-distended, +obese   Musculoskeletal: No bilateral ankle edema  Psychiatric: Flat affect, cooperative  Neurologic: Oriented x 3, +dysarthria, L sided weakness   Skin: No rashes     Results Reviewed:  LAB RESULTS:      Lab 25  1419 25  1639 25  1634   WBC 10.56  --  7.75   HEMOGLOBIN 14.7  --  15.0   HEMOGLOBIN, POC  --  15.0  --    HEMATOCRIT 45.0  --  45.9   HEMATOCRIT POC  --  44  --    PLATELETS 238  --  252   NEUTROS ABS  --   --  4.47   IMMATURE GRANS (ABS)  --   --  0.01   LYMPHS ABS  --   --  2.42   MONOS ABS  --   --  0.66   EOS ABS  --   --  0.14   MCV 96.6  --  97.2*   LACTATE  --   --  1.5   PROTIME  --   --  13.4   APTT  --   --  30.9         Lab 25  1419 25  1639   SODIUM 137  --    POTASSIUM 4.1  --    CHLORIDE 103  --    CO2 22.0  --    ANION GAP 12.0  --    BUN 11  --    CREATININE 0.86 1.00   EGFR 80.4 67.1   GLUCOSE 129*  --    CALCIUM 9.1  --    HEMOGLOBIN A1C 6.00*  --          Lab 25  1634   ALT (SGPT) 9   AST (SGOT)  15         Lab 03/29/25  1634   PROTIME 13.4   INR 1.01         Lab 03/30/25  1419   CHOLESTEROL 126   LDL CHOL 72   HDL CHOL 41   TRIGLYCERIDES 59             Lab 03/29/25  1707   PH, ARTERIAL 7.399   PCO2, ARTERIAL 37.7   PO2 .0   FIO2 21   HCO3 ART 23.3   BASE EXCESS ART -1.2*   CARBOXYHEMOGLOBIN 0.7     Brief Urine Lab Results  (Last result in the past 365 days)        Color   Clarity   Blood   Leuk Est   Nitrite   Protein   CREAT   Urine HCG        03/29/25 1747 Yellow   Clear   Negative   Negative   Negative   Negative                   Microbiology Results Abnormal       None            MRI Brain Without Contrast  Result Date: 3/30/2025  MRI BRAIN WO CONTRAST Date of Exam: 3/29/2025 11:54 PM EDT Indication: Stroke, follow up Left sided weakness and dysartrhia, history of bilateral stroke.  Comparison: 3/29/2025 Technique:  Routine multiplanar/multisequence sequence images of the brain were obtained without contrast administration. Findings: There are a few scattered foci of increased T2 and FLAIR signal scattered in the subcortical white matter bilaterally consistent with chronic microvascular ischemia. There is no mass, mass effect or midline shift. There are no abnormal extra-axial fluid collections or areas of acute hemorrhage. The major intracranial flow voids are preserved. The diffusion weighted sequences are normal. The paranasal sinuses and mastoid air cells are clear.     Impression: Impression: Mild chronic microvascular ischemia. No acute intracranial process. Electronically Signed: Saeid Del Rio MD  3/30/2025 4:02 AM EDT  Workstation ID: [id number]      Results for orders placed during the hospital encounter of 03/29/25    Adult Transthoracic Echo Complete W/ Cont if Necessary Per Protocol (With Agitated Saline)    Interpretation Summary    Left ventricular systolic function is normal. Left ventricular ejection fraction appears to be 56 - 60%.    Mild to moderate aortic valve regurgitation is  present.    Normal left atrial size and volume noted. Saline test results are negative.      Current medications:  Scheduled Meds:amoxicillin-clavulanate, 1 tablet, Oral, Q12H  apixaban, 5 mg, Oral, Q12H  atorvastatin, 80 mg, Oral, Nightly  cetirizine, 10 mg, Oral, Daily  diphenhydrAMINE, 12.5 mg, Intravenous, Q6H  FLUoxetine, 60 mg, Oral, Daily  insulin lispro, 2-7 Units, Subcutaneous, 4x Daily AC & at Bedtime  levothyroxine, 75 mcg, Oral, Q AM  pantoprazole, 40 mg, Oral, Q AM  prochlorperazine, 10 mg, Intravenous, Q6H  sacubitril-valsartan, 1 tablet, Oral, BID  sucralfate, 1 g, Oral, 4x Daily AC & at Bedtime      Continuous Infusions:   PRN Meds:.  acetaminophen **OR** acetaminophen **OR** acetaminophen    senna-docusate sodium **AND** polyethylene glycol **AND** bisacodyl **AND** bisacodyl    calcium carbonate    Calcium Replacement - Follow Nurse / BPA Driven Protocol    dextrose    dextrose    glucagon (human recombinant)    Magnesium Standard Dose Replacement - Follow Nurse / BPA Driven Protocol    nitroglycerin    Phosphorus Replacement - Follow Nurse / BPA Driven Protocol    Potassium Replacement - Follow Nurse / BPA Driven Protocol    sodium chloride    sodium chloride    Assessment & Plan   Assessment & Plan     Active Hospital Problems    Diagnosis  POA    **TIA (transient ischemic attack) [G45.9]  Yes      Resolved Hospital Problems   No resolved problems to display.        Brief Hospital Course to date:  Rehana Hamilton is a 54 y.o. female with known diagnosis of prior CVA (bilateral, 2024), history of PE (on Eliquis), HTN, T2DM, CHF, COPD, RA, SORAYA, history of colorectal cancer s/p colectomy (2022), and hypothyroidism who presents with headache, dysarthria, and worsening left-sided weakness.  Patient's spouse evidently noticed the patient becoming weaker throughout the day with slurred speech that began at approximately 1430 3/29. Transferred to my services on the AM of 3/31     This patient's  problems and plans were partially entered by my partner and updated as appropriate by me 03/31/25.      Left sided weakness  Dysarthria  History of PE - on eliquis  Chronic bilateral strokes  -Stroke Neurology follows  -MRI brain without acute findings  -TTE with bubble negative   -Non-compliant with Eliquis for the past few weeks   -PT/OT recommend rehab     Domestic dog bite, cellulitis of posterior R lower leg  -patient reports being bit by her own dog a few days ago. She believes her dogs are up to date on rabies vaccine. Practice partner discussed risk/benefits of rabies vaccine/immunoglobulin. Patient expressed understanding of the seriousness possible life threatening disease of rabies however declines intervention.   -DC Unasyn, changed to Augmentin  -Received tetanus shot      GERD  -continue with PPI, Carafate     Essential hypertension  -Allow autoregulation of blood pressure for adequate cerebral blood flow  -Nicardipine as needed for SBP >220  -hold home Entresto 24-26 twice daily, spironolactone 25 daily, carvedilol 25 mg twice daily (of note, patient has been bradycardic since admission)  -Hold meds until cleared by Neurology to resume. Would not resume Coreg with current HR.  - Resumed home Entresto on 3/31     Hyperlipidemia  -Lipid panel reviewed   -Atorvastatin 80mg nightly     Hypothyroidism  -continue home medications      Diabetes Mellitus type 2  -A1c 6.0  -Maintain euglycemia  -SSI, hold Jardiance     Mood disorder  -continue with Fluoxetine 60 mg QD    Social issues  -has had water and electric turned off  -SW to see     Expected Discharge Location and Transportation: therapy recommends rehab, pending at this time.   Expected Discharge   Expected Discharge Date: 4/2/2025; Expected Discharge Time:      VTE Prophylaxis:  Pharmacologic & mechanical VTE prophylaxis orders are present.         AM-PAC 6 Clicks Score (PT): 14 (03/31/25 8124)    CODE STATUS:   Code Status and Medical Interventions:  CPR (Attempt to Resuscitate); Full Support   Ordered at: 03/29/25 2009     Code Status (Patient has no pulse and is not breathing):    CPR (Attempt to Resuscitate)     Medical Interventions (Patient has pulse or is breathing):    Full Support     Level Of Support Discussed With:    Patient       PALMER Carl, DO  03/31/25

## 2025-03-31 NOTE — PROGRESS NOTES
"          Clinical Nutrition Assessment     Patient Name: Rehana Hamilton  YOB: 1970  MRN: [id number]  Date of Encounter: 03/31/25 15:47 EDT  Admission date: 3/29/2025  Reason for Visit: Identified at risk by screening criteria, Reduced oral intake    Assessment   Nutrition Assessment   Admission Diagnosis:  TIA (transient ischemic attack) [G45.9]    Problem List:    TIA (transient ischemic attack)      PMH:   She  has a past medical history of Allergic rhinitis, Cancer of transverse colon, Colon cancer (10/11/2016), Congestive heart failure, COPD (chronic obstructive pulmonary disease), Depression, Disease of thyroid gland, Esophagitis, GERD (gastroesophageal reflux disease), History of degenerative disc disease, History of migraine, History of nuclear stress test (2017), History of sleep apnea, Hypertension, Personal history of congestive heart failure, PONV (postoperative nausea and vomiting), Rheumatoid arthritis, Tattoo, and Type 2 diabetes mellitus.    PSH:  She  has a past surgical history that includes Cholecystectomy; Hysterectomy; Tubal ligation; Dilation and curettage, diagnostic / therapeutic; Upper gastrointestinal endoscopy; Colonoscopy w/ biopsies; Colonoscopy w/ polypectomy; Colonoscopy (N/A, 10/2/2017); Esophagogastroduodenoscopy (N/A, 10/2/2017); Colectomy (05/01/2014); Colonoscopy (N/A, 12/7/2018); and Colonoscopy (N/A, 9/11/2020).    Applicable Nutrition History:       Anthropometrics     Height: Height: 162 cm (63.78\")  Last Filed Weight: Weight: 93 kg (205 lb 0.4 oz) (03/30/25 1318)  Method:    BMI: BMI (Calculated): 35.4    UBW:  180lbs per pt report  Weight change:  RD obtained bed wt of 190lbs, ? accuracy   Weight       Weight (kg) Weight (lbs) Weight Method Visit Report   12/7/2018 103.874 kg  229 lb  Stated     7/30/2020 99.338 kg  219 lb  Stated     9/10/2020 93.441 kg  206 lb  Stated     3/29/2025 93.441 kg  206 lb      3/30/2025 93 kg  205 lb 0.4 oz        Nutrition " Focused Physical Exam    Date: 3/31    Pt does not meet criteria for malnutrition diagnosis, at this time.      Subjective   Reported/Observed/Food/Nutrition Related History:     Pt reports good appetite and intake recently. Pt does report wt loss in the past 2/2 food accessibility. Pt states she currently has adequate access to food and has been eating well. Pt denies dysphagia or food allergies. Noted CM following to ensure reliable access to food at discharge.     Current Nutrition Prescription   PO: Diet: Cardiac, Diabetic; Healthy Heart (2-3 Na+); Consistent Carbohydrate; Fluid Consistency: Thin (IDDSI 0)  Oral Nutrition Supplement:   Intake:  50% x 1 meal    Assessment & Plan   Nutrition Diagnosis   Date: 3/31             Updated:    Problem Unintended weight loss    Etiology Food accessibility   Signs/Symptoms Pt report, ? 15lbs wt loss over unknown duration   Status: New  Goal / Objectives:   Nutrition to support treatment and Establish PO      Nutrition Intervention      Follow treatment progress, Care plan reviewed, Encourage intake, Supplement provided    Ordered Boost (kenzie) BID per pt preference    Monitoring/Evaluation:   Per protocol, PO intake, Supplement intake    Jennifer Sánchez, MS,RD,LD  Time Spent:20

## 2025-03-31 NOTE — CASE MANAGEMENT/SOCIAL WORK
Continued Stay Note  [facility]     Patient Name: Rehana Hamilton  MRN: [id number]  Today's Date: 3/31/2025    Admit Date: 3/29/2025    Plan: IDP   Discharge Plan       Row Name 03/31/25 2958       Plan    Plan Comments MSW spoke with pt at bedside. Pt reports that her and her  live in a home that was her father in laws and he was the one paying the bills. Pt reports that her father in law recently passed away and since, then, her and her  have had issues paying the bills. Pt reports her and her  do not have any income. Pt reports that her  is working with a  to get things established with his father's estate and things and has always been applying and interviewing for jobs to get an income. Pt reports that she is working with an  as well to apply for disbaility currently. Pt reports it has been difficult to pay the utility bills and they have not had water for a few months now and now electric hjas gotten turned off. MSW provided and explained information for bill pay assistance. Pt does have medical insurance with Medicaid and reports she has just started getting her SNAP benefits for food back. MSW explained her local financial and bill pay assistance resources. MSW explained and provided information on [place] transportation. Pt also has several dogs in the home. Pt reports that for water, they have been getting the big jugs of drinking water and filling tubs with rain water and things to help for the dogs and for bathing. Pt reports that she does have other familty support and her mother and sister in law also assist with buying water jugs and groceries and dog food. Pt reports that she has no cocnerns of safety in returning to the home and they have been able to manage and get what they need. MSW provided all of the local community and financial resources for pt.      Row Name 03/31/25 1202                                         Discharge Codes    No  documentation.                 Expected Discharge Date and Time       Expected Discharge Date Expected Discharge Time    Mar 31, 2025               AMILCAR العراقي

## 2025-03-31 NOTE — DISCHARGE INSTRUCTIONS
-Continue the following medications: Atorvastatin 80mg nightly and Eliquis 5mg twice daily  -Increase physical activity as tolerated; goal exercise 30 minutes/day 3-5 times weekly if able  -Call 911 for stroke symptoms (unilateral weakness, unilateral numbness, vision loss/double vision, speech difficulty, trouble walking, sudden severe headache or headache with nausea/vomiting/confusion/or decreased level of consciousness)  -Schedule follow-up with your primary care physician

## 2025-03-31 NOTE — PROGRESS NOTES
Stroke Neurology Progress Note     Subjective     This patient was seen in follow-up for: complex headache versus MRI negative stroke  Present for the encounter were: self, patient     Subjective: Patient is lying in bed asleep when I entered the room.  No family at the bedside.  Patient is arousable by verbal stimuli, however is drowsy and requires repeated verbal stimulation to participate in exam.  She tells me that headache is better today rating it a 3/10.  We discussed imaging results.  All questions and concerns were answered.      Objective      Temp:  [97.7 °F (36.5 °C)-98.6 °F (37 °C)] 98.6 °F (37 °C)  Heart Rate:  [45-70] 70  Resp:  [16-18] 18  BP: (129-182)/(66-76) 148/76        Objective    Physical Exam:  General Appearance: Alert  HEENT: anicteric sclera, no scleral injection  Lungs: respirations appear comfortable, no obvious increased work of breathing  Extremities: No cyanosis or fingernail clubbing   Skin: No rashes in exposed skin areas     Neurological Examination:   Mental status: Alert and oriented.  Mild dysarthria. Able to name and repeat.  Cranial Nerves: Visual fields intact. Extraocular movements intact with no nystagmus. Midline gaze.  Left facial droop.  Sensory: Normal sensory exam to light touch.  Motor: Normal tone. Absent pronator drift.  Strength:  LUE: 3/5 biceps, triceps,   LLE: 3/5 hip flexion/extension  RUE: 5/5 biceps, triceps,   RLE:  Drift, 4/5 hip flexion/extension      Labs:    Lab Results   Component Value Date    HGBA1C 6.00 (H) 03/30/2025      Lab Results   Component Value Date    CHOL 126 03/30/2025    CHLPL 155 07/23/2016    TRIG 59 03/30/2025    HDL 41 03/30/2025    LDL 72 03/30/2025       Lab Results   Component Value Date    WBC 10.56 03/30/2025    HGB 14.7 03/30/2025    HCT 45.0 03/30/2025    MCV 96.6 03/30/2025     03/30/2025     Lab Results   Component Value Date    GLUCOSE 129 (H) 03/30/2025    BUN 11 03/30/2025    CREATININE 0.86 03/30/2025     "BCR 12.8 03/30/2025    CO2 22.0 03/30/2025    CALCIUM 9.1 03/30/2025    ALBUMIN 3.8 07/23/2016    AST 15 03/29/2025    ALT 9 03/29/2025       Results from last 7 days   Lab Units 03/30/25  1419 03/29/25  1639 03/29/25  1634   SODIUM mmol/L 137  --   --    POTASSIUM mmol/L 4.1  --   --    CHLORIDE mmol/L 103  --   --    CO2 mmol/L 22.0  --   --    BUN mg/dL 11  --   --    CREATININE mg/dL 0.86 1.00  --    CALCIUM mg/dL 9.1  --   --    ALT (SGPT) U/L  --   --  9   AST (SGOT) U/L  --   --  15   GLUCOSE mg/dL 129*  --   --        No results found for: \"BLOODCX\"  UA          3/29/2025    17:47   Urinalysis   Specific [place], UA 1.035    Ketones, UA Negative    Blood, UA Negative    Leukocytes, UA Negative    Nitrite, UA Negative        Results Review:      All brain images and reports were personally reviewed and I agree with the interpretations except as noted below.    MRI Brain Without Contrast 3/30/2025  Impression: Mild chronic microvascular ischemia. No acute intracranial process.     CT Angiogram Head and Neck 3/29/2025  Impression: 1. No hemodynamically significant stenosis, large vessel cut off or aneurysms in the intracranial circulation 2. No hemodynamically significant stenosis, dissection or aneurysms in the extracranial circulation.     CT Perfusion 3/29/2025  Impression: 1. No evidence of core infarct nor ischemic brain at risk.     CT Head Without Contrast 3/29/2025  Impression: No acute intracranial abnormality.           Assessment/Plan     Assessment: 54-year-old, , right-handed female with known diagnosis of prior CVA (bilateral, 2024), history of PE (on Eliquis), HTN, T2DM, CHF, COPD, RA, SORAYA, history of colorectal cancer s/p colectomy (2022), and hypothyroidism who presents today with headache, dysarthria, and worsening left-sided weakness.  Patient's spouse reported progressive weakness throughout the day with slurred speech that began at approximately 1430.  NIH on arrival 10.  CT head " negative for acute abnormality.  CTA head and neck negative for flow-limiting stenosis no LVO or aneurysm.  CT perfusion is negative.  Not a candidate for thrombolytics due to Eliquis use     Complex headache versus MRI negative stroke  -Migraine cocktail ordered:  magnesium 2 g IV daily, prednisone 20 mg daily, prochlorperazine 10 mg every 6 hours, diphenhydramine 12.5 mg every 6 hours, valproic acid 500 mg daily, pantoprazole, and IV fluids  -Can add ketorolac or gabapentin if patient's headache does not improve.  -Headache prevention with magnesium 400 mg daily and riboflavin 400 mg daily as outpatient  -Follow-up in headache clinic (added to ADT)  -PT/OT recommending IPR  -Normal blood pressure goals, management primary team, okay to restart home meds  -Case management and social work to see and follow for discharge needs    Chronic multifocal infarct  History of PE  -Continue home apixaban 5 mg twice daily  -Continue atorvastatin 80 mg daily        Discussed the importance of medication compliance Atorvastatin 80mg nightly and Eliquis 5mg twice daily and lifestyle modifications adequate control of blood pressure, adequate control of cholesterol (goal LDL <70), adequate control of glucose (<140, A1c goal <7), increased physical activity, and implementation of healthy diet to help reduce the risk of future cerebrovascular events.  Also discussed the signs symptoms that would warrant the patient return back to the emergency department including unilateral weakness, unilateral numbness, visual disturbances, loss of balance, speech difficulties, and/or a sudden severe headache.  Patient verbalized understanding.     Care discussed with patient.  Stroke neurology will sign off at this time.  Please call with any questions or concerns.     JOSE Chavez  [facility] [place] [facility]  03/31/25  07:53 EDT

## 2025-03-31 NOTE — PLAN OF CARE
Goal Outcome Evaluation:  Plan of Care Reviewed With: patient        Progress: no change       Anticipated Discharge Disposition (SLP): No further SLP services warranted    SLP Diagnosis: baseline, cognitive-linguistic disorder, dysarthria (03/31/25 1000)

## 2025-04-01 LAB
GLUCOSE BLDC GLUCOMTR-MCNC: 113 MG/DL (ref 70–130)
GLUCOSE BLDC GLUCOMTR-MCNC: 126 MG/DL (ref 70–130)
GLUCOSE BLDC GLUCOMTR-MCNC: 128 MG/DL (ref 70–130)
GLUCOSE BLDC GLUCOMTR-MCNC: 138 MG/DL (ref 70–130)

## 2025-04-01 PROCEDURE — 25010000002 PROCHLORPERAZINE 10 MG/2ML SOLUTION: Performed by: STUDENT IN AN ORGANIZED HEALTH CARE EDUCATION/TRAINING PROGRAM

## 2025-04-01 PROCEDURE — 96376 TX/PRO/DX INJ SAME DRUG ADON: CPT

## 2025-04-01 PROCEDURE — 25010000002 DIPHENHYDRAMINE PER 50 MG: Performed by: STUDENT IN AN ORGANIZED HEALTH CARE EDUCATION/TRAINING PROGRAM

## 2025-04-01 PROCEDURE — 97110 THERAPEUTIC EXERCISES: CPT

## 2025-04-01 PROCEDURE — 82948 REAGENT STRIP/BLOOD GLUCOSE: CPT

## 2025-04-01 PROCEDURE — 97116 GAIT TRAINING THERAPY: CPT

## 2025-04-01 PROCEDURE — G0378 HOSPITAL OBSERVATION PER HR: HCPCS

## 2025-04-01 PROCEDURE — 99232 SBSQ HOSP IP/OBS MODERATE 35: CPT | Performed by: FAMILY MEDICINE

## 2025-04-01 RX ORDER — SPIRONOLACTONE 25 MG/1
25 TABLET ORAL DAILY
Status: DISCONTINUED | OUTPATIENT
Start: 2025-04-02 | End: 2025-04-04 | Stop reason: HOSPADM

## 2025-04-01 RX ADMIN — SUCRALFATE 1 G: 1 TABLET ORAL at 20:31

## 2025-04-01 RX ADMIN — SACUBITRIL AND VALSARTAN 1 TABLET: 24; 26 TABLET, FILM COATED ORAL at 09:54

## 2025-04-01 RX ADMIN — PROCHLORPERAZINE EDISYLATE 10 MG: 5 INJECTION INTRAMUSCULAR; INTRAVENOUS at 05:12

## 2025-04-01 RX ADMIN — SUCRALFATE 1 G: 1 TABLET ORAL at 09:53

## 2025-04-01 RX ADMIN — EMPAGLIFLOZIN 25 MG: 25 TABLET, FILM COATED ORAL at 17:45

## 2025-04-01 RX ADMIN — DIPHENHYDRAMINE HYDROCHLORIDE 12.5 MG: 50 INJECTION INTRAMUSCULAR; INTRAVENOUS at 05:13

## 2025-04-01 RX ADMIN — AMOXICILLIN AND CLAVULANATE POTASSIUM 1 TABLET: 875; 125 TABLET, FILM COATED ORAL at 20:31

## 2025-04-01 RX ADMIN — SUCRALFATE 1 G: 1 TABLET ORAL at 13:23

## 2025-04-01 RX ADMIN — SACUBITRIL AND VALSARTAN 1 TABLET: 24; 26 TABLET, FILM COATED ORAL at 20:31

## 2025-04-01 RX ADMIN — FLUOXETINE HYDROCHLORIDE 60 MG: 20 CAPSULE ORAL at 09:53

## 2025-04-01 RX ADMIN — SUCRALFATE 1 G: 1 TABLET ORAL at 17:45

## 2025-04-01 RX ADMIN — PROCHLORPERAZINE EDISYLATE 10 MG: 5 INJECTION INTRAMUSCULAR; INTRAVENOUS at 09:54

## 2025-04-01 RX ADMIN — DIPHENHYDRAMINE HYDROCHLORIDE 12.5 MG: 50 INJECTION INTRAMUSCULAR; INTRAVENOUS at 09:54

## 2025-04-01 RX ADMIN — APIXABAN 5 MG: 5 TABLET, FILM COATED ORAL at 09:54

## 2025-04-01 RX ADMIN — LEVOTHYROXINE SODIUM 75 MCG: 0.07 TABLET ORAL at 05:13

## 2025-04-01 RX ADMIN — AMOXICILLIN AND CLAVULANATE POTASSIUM 1 TABLET: 875; 125 TABLET, FILM COATED ORAL at 09:54

## 2025-04-01 RX ADMIN — CETIRIZINE HYDROCHLORIDE 10 MG: 10 TABLET, FILM COATED ORAL at 09:54

## 2025-04-01 RX ADMIN — ACETAMINOPHEN 500 MG: 500 TABLET ORAL at 17:50

## 2025-04-01 RX ADMIN — PANTOPRAZOLE SODIUM 40 MG: 40 TABLET, DELAYED RELEASE ORAL at 09:57

## 2025-04-01 RX ADMIN — ATORVASTATIN CALCIUM 80 MG: 40 TABLET, FILM COATED ORAL at 20:31

## 2025-04-01 RX ADMIN — APIXABAN 5 MG: 5 TABLET, FILM COATED ORAL at 20:31
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
## 2025-04-01 NOTE — THERAPY TREATMENT NOTE
"Patient Name: Rehana Hamilton  : 1970    MRN: [id number]                              Today's Date: 2025       Admit Date: 3/29/2025    Visit Dx:     ICD-10-CM ICD-9-CM   1. Acute left-sided weakness  R53.1 728.87   2. Lethargy  R53.83 780.79   3. Headache, variant migraine  G43.809 346.20   4. Dysarthria  R47.1 784.51     Patient Active Problem List   Diagnosis    Allergic rhinitis    GERD (gastroesophageal reflux disease)    Hypertension    Migraine    Sleep apnea    Rheumatoid arthritis    Type 2 diabetes mellitus    Congestive heart failure    Chronic obstructive lung disease    Colon cancer    Hypothyroidism    Encounter for colonoscopy due to history of colon cancer    Gastroesophageal reflux disease    Personal history of colon cancer    Abdominal pain    Acute pulmonary edema    Blood in feces    Chronic nausea    Esophagitis, reflux    Medically noncompliant    Pedal edema    SOB (shortness of breath)    Pulmonary embolism with infarction    Cancer of transverse colon    Hypertension    TIA (transient ischemic attack)     Past Medical History:   Diagnosis Date    Allergic rhinitis     Cancer of transverse colon     Personal history of colon cancer    Colon cancer 10/11/2016    Congestive heart failure     COPD (chronic obstructive pulmonary disease)     Depression     Disease of thyroid gland     Esophagitis     GERD (gastroesophageal reflux disease)     History of degenerative disc disease     History of migraine     History of nuclear stress test 2017    \"IT WAS NORMAL\"    History of sleep apnea     NO CPAP    Hypertension     Personal history of congestive heart failure     PONV (postoperative nausea and vomiting)     Rheumatoid arthritis     Tattoo     X5    Type 2 diabetes mellitus      Past Surgical History:   Procedure Laterality Date    CHOLECYSTECTOMY      COLON RESECTION  2014    SECONDARY TO COLON CANCER    COLONOSCOPY N/A 10/2/2017    Procedure: DIAGNOSTIC COLONOSCOPY;  " Surgeon: Elina Summers MD;  Location: [facility] ENDOSCOPY;  Service:     COLONOSCOPY N/A 12/7/2018    Procedure: COLONOSCOPY;  Surgeon: Elina Summers MD;  Location: [facility] ENDOSCOPY;  Service: Gastroenterology    COLONOSCOPY N/A 9/11/2020    Procedure: COLONOSCOPY W/ COLD SNARE POLYPECTOMY;  Surgeon: Elina Summers MD;  Location: [facility] ENDOSCOPY;  Service: Gastroenterology;  Laterality: N/A;    COLONOSCOPY W/ BIOPSIES      COLONOSCOPY W/ POLYPECTOMY      DILATION AND CURETTAGE, DIAGNOSTIC / THERAPEUTIC      ENDOSCOPY N/A 10/2/2017    Procedure: ESOPHAGOGASTRODUODENOSCOPY WITH BIOPSY;  Surgeon: Elina Summers MD;  Location: [facility] ENDOSCOPY;  Service:     HYSTERECTOMY      Total Abdominal Hysterectomy with Removal of Both Ovaries    TUBAL ABDOMINAL LIGATION      UPPER GASTROINTESTINAL ENDOSCOPY      Diagnostic      General Information       Row Name 04/01/25 1440          Physical Therapy Time and Intention    Document Type therapy note (daily note)  -KR     Mode of Treatment physical therapy;individual therapy  -KR       Row Name 04/01/25 1440          General Information    Patient Profile Reviewed yes  -KR     Existing Precautions/Restrictions fall;other (see comments)  L sided hemiparesis  -KR     Barriers to Rehab medically complex;previous functional deficit;visual deficit  -KR       Row Name 04/01/25 1440          Cognition    Orientation Status (Cognition) oriented x 3  -KR       Row Name 04/01/25 1440          Safety Issues/Impairments Affecting Functional Mobility    Safety Issues Affecting Function (Mobility) awareness of need for assistance;insight into deficits/self-awareness;safety precaution awareness;safety precautions follow-through/compliance;sequencing abilities  -KR     Impairments Affecting Function (Mobility) balance;coordination;endurance/activity tolerance;motor planning;motor control;postural/trunk control;sensation/sensory  awareness;strength;visual/perceptual;grasp  -KR               User Key  (r) = Recorded By, (t) = Taken By, (c) = Cosigned By      Initials Name Provider Type    Sia Fry, PT Physical Therapist                   Mobility       Row Name 04/01/25 1441          Bed Mobility    Bed Mobility supine-sit  -KR     Supine-Sit [place] (Bed Mobility) minimum assist (75% patient effort);1 person assist  -KR     Assistive Device (Bed Mobility) bed rails;head of bed elevated;repositioning sheet  -KR     Comment, (Bed Mobility) assist to scoot hips to EOB  -KR       Row Name 04/01/25 1441          Bed-Chair Transfer    Bed-Chair [place] (Transfers) minimum assist (75% patient effort);2 person assist  -KR     Assistive Device (Bed-Chair Transfers) other (see comments)  BUE support  -KR     Comment, (Bed-Chair Transfer) side steps towards R. No knee buckling.  -KR       Row Name 04/01/25 1441          Sit-Stand Transfer    Sit-Stand [place] (Transfers) minimum assist (75% patient effort);2 person assist  -KR     Assistive Device (Sit-Stand Transfers) walker, front-wheeled  -KR     Comment, (Sit-Stand Transfer) 1x from chair with cues for hand placement; additional 5x STS transfer training from chair with cues for hand placement, ISAAC, and anterior weight shift. Progressed to CGA.  -KR       Row Name 04/01/25 1441          Gait/Stairs (Locomotion)    [place] Level (Gait) minimum assist (75% patient effort);2 person assist  -KR     Assistive Device (Gait) walker, front-wheeled  -KR     Distance in Feet (Gait) 8  -KR     Deviations/Abnormal Patterns (Gait) finesse decreased;gait speed decreased;stride length decreased;weight shifting decreased;bilateral deviations;left sided deviations  -KR     Left Sided Gait Deviations foot drop/toe drag  -KR     Comment, (Gait/Stairs) decreased LLE clearance during swing phase, cues for increased L knee flexion, as well as cues for upright posture. Pt may benefit from  either DFA wrap or LE theraband flexion assist wrap next session.  -KR               User Key  (r) = Recorded By, (t) = Taken By, (c) = Cosigned By      Initials Name Provider Type    KR Sia King PT Physical Therapist                   Obj/Interventions       Row Name 04/01/25 1444          Motor Skills    Therapeutic Exercise hip;knee;ankle  -       Row Name 04/01/25 1444          Hip (Therapeutic Exercise)    Hip (Therapeutic Exercise) isometric exercises;strengthening exercise  -KR     Hip Isometrics (Therapeutic Exercise) 10 repetitions;bilateral;gluteal sets  -     Hip Strengthening (Therapeutic Exercise) 10 repetitions;left;aDduction;aBduction;marching while standing  -       Row Name 04/01/25 1444          Knee (Therapeutic Exercise)    Knee (Therapeutic Exercise) strengthening exercise;isometric exercises  -KR     Knee Isometrics (Therapeutic Exercise) 10 repetitions;left;quad sets  -KR     Knee Strengthening (Therapeutic Exercise) 10 repetitions;left;SLR (straight leg raise);LAQ (long arc quad)  -       Row Name 04/01/25 1444          Ankle (Therapeutic Exercise)    Ankle (Therapeutic Exercise) AROM (active range of motion)  -     Ankle AROM (Therapeutic Exercise) 10 repetitions;left;dorsiflexion;plantarflexion  -       Row Name 04/01/25 1444          Balance    Balance Assessment sitting static balance;sitting dynamic balance;standing static balance;standing dynamic balance  -KR     Static Sitting Balance standby assist  -KR     Dynamic Sitting Balance contact guard  -KR     Position, Sitting Balance unsupported;sitting in chair;sitting edge of bed  -KR     Static Standing Balance contact guard  -KR     Dynamic Standing Balance minimal assist;2-person assist;non-verbal cues (demo/gesture);verbal cues  -KR     Position/Device Used, Standing Balance supported;walker, front-wheeled  -KR     Balance Interventions sitting;standing;supported;static;dynamic;sit to stand  -KR               User  Key  (r) = Recorded By, (t) = Taken By, (c) = Cosigned By      Initials Name Provider Type    Sia Fry PT Physical Therapist                   Goals/Plan    No documentation.                  Clinical Impression       Row Name 04/01/25 1444          Pain    Pretreatment Pain Rating 0/10 - no pain  -KR     Posttreatment Pain Rating 0/10 - no pain  -KR       Row Name 04/01/25 1444          Plan of Care Review    Plan of Care Reviewed With patient  -KR     Progress improving  -KR     Outcome Evaluation Pt required less assist for all mobility tasks this date, able to progress to 8' ambulation with minAx2 and no knee buckling. Pt continues to demo LLE weakness with difficulty clearing leg during ambulation. Pt will benefit from PT to address ongoing strength, balance, and endurance deficits. PT rec IRF upon dc.  -KR       Row Name 04/01/25 1444          Vital Signs    Pre Patient Position Supine  -KR     Intra Patient Position Standing  -KR     Post Patient Position Sitting  -KR       Row Name 04/01/25 1444          Positioning and Restraints    Pre-Treatment Position in bed  -KR     Post Treatment Position chair  -KR     In Chair notified nsg;reclined;call light within reach;encouraged to call for assist;exit alarm on;waffle cushion;legs elevated  -KR               User Key  (r) = Recorded By, (t) = Taken By, (c) = Cosigned By      Initials Name Provider Type    Sia Fry PT Physical Therapist                   Outcome Measures       Row Name 04/01/25 1446 04/01/25 0830       How much help from another person do you currently need...    Turning from your back to your side while in flat bed without using bedrails? 3  -KR 3  -MS    Moving from lying on back to sitting on the side of a flat bed without bedrails? 3  -KR 3  -MS    Moving to and from a bed to a chair (including a wheelchair)? 3  -KR 2  -MS    Standing up from a chair using your arms (e.g., wheelchair, bedside chair)? 3  -KR 2  -MS     Climbing 3-5 steps with a railing? 2  -KR 2  -MS    To walk in hospital room? 3  -KR 2  -MS    AM-PAC 6 Clicks Score (PT) 17  -KR 14  -MS    Highest Level of Mobility Goal 5 --> Static standing  -KR 4 --> Transfer to chair/commode  -MS              User Key  (r) = Recorded By, (t) = Taken By, (c) = Cosigned By      Initials Name Provider Type    Zahida French, RN Registered Nurse    Sia Fry, PT Physical Therapist                                 Physical Therapy Education       Title: PT OT SLP Therapies (In Progress)       Topic: Physical Therapy (In Progress)       Point: Mobility training (Done)       Learning Progress Summary            Patient Acceptance, E, VU by KR at 4/1/2025 1446    Eager, E, VU,DU,NR by  at 3/30/2025 1144    Comment: Educated pt. safety/technique w/bed mobility, transfers, PT POC                      Point: Home exercise program (Not Started)       Learner Progress:  Not documented in this visit.              Point: Body mechanics (Done)       Learning Progress Summary            Patient Acceptance, E, VU by KR at 4/1/2025 1446    Eager, E, VU,DU,NR by  at 3/30/2025 1144    Comment: Educated pt. safety/technique w/bed mobility, transfers, PT POC                      Point: Precautions (Done)       Learning Progress Summary            Patient Acceptance, E, VU by KR at 4/1/2025 1446    Eager, E, VU,DU,NR by  at 3/30/2025 1144    Comment: Educated pt. safety/technique w/bed mobility, transfers, PT POC                                      User Key       Initials Effective Dates Name Provider Type Discipline     06/01/21 -  Estelle Rangel, PT Physical Therapist PT     12/30/22 -  Sia King, PT Physical Therapist PT                  PT Recommendation and Plan     Progress: improving  Outcome Evaluation: Pt required less assist for all mobility tasks this date, able to progress to 8' ambulation with minAx2 and no knee buckling. Pt continues to demo LLE weakness  with difficulty clearing leg during ambulation. Pt will benefit from PT to address ongoing strength, balance, and endurance deficits. PT rec IRF upon dc.     Time Calculation:         PT Charges       Row Name 04/01/25 1446             Time Calculation    Start Time 1417  -KR      PT Received On 04/01/25  -KR         Timed Charges    04671 - PT Therapeutic Exercise Minutes 12  -KR      61974 - Gait Training Minutes  6  -KR      13576 - PT Therapeutic Activity Minutes 5  -KR         Total Minutes    Timed Charges Total Minutes 23  -KR       Total Minutes 23  -KR                User Key  (r) = Recorded By, (t) = Taken By, (c) = Cosigned By      Initials Name Provider Type    Sia Fry PT Physical Therapist                  Therapy Charges for Today       Code Description Service Date Service Provider Modifiers Qty    63416221119 HC PT THER PROC EA 15 MIN 4/1/2025 Sia King, PT GP 1    36534270844 HC GAIT TRAINING EA 15 MIN 4/1/2025 Sia King, PT GP 1    11725863533 HC PT THER SUPP EA 15 MIN 4/1/2025 Sia King, PT GP 1            PT G-Codes  Outcome Measure Options: AM-PAC 6 Clicks Basic Mobility (PT), Modified Stark  AM-PAC 6 Clicks Score (PT): 17  AM-PAC 6 Clicks Score (OT): 13  Modified Stark Scale: 4 - Moderately severe disability.  Unable to walk without assistance, and unable to attend to own bodily needs without assistance.  PT Discharge Summary  Anticipated Discharge Disposition (PT): inpatient rehabilitation facility    Sia King PT  4/1/2025

## 2025-04-01 NOTE — PLAN OF CARE
Goal Outcome Evaluation:  Plan of Care Reviewed With: patient        Progress: improving  Outcome Evaluation: Pt required less assist for all mobility tasks this date, able to progress to 8' ambulation with minAx2 and no knee buckling. Pt continues to demo LLE weakness with difficulty clearing leg during ambulation. Pt will benefit from PT to address ongoing strength, balance, and endurance deficits. PT rec IRF upon dc.    Anticipated Discharge Disposition (PT): inpatient rehabilitation facility

## 2025-04-02 LAB
GLUCOSE BLDC GLUCOMTR-MCNC: 106 MG/DL (ref 70–130)
GLUCOSE BLDC GLUCOMTR-MCNC: 111 MG/DL (ref 70–130)
GLUCOSE BLDC GLUCOMTR-MCNC: 128 MG/DL (ref 70–130)
GLUCOSE BLDC GLUCOMTR-MCNC: 154 MG/DL (ref 70–130)

## 2025-04-02 PROCEDURE — 82948 REAGENT STRIP/BLOOD GLUCOSE: CPT

## 2025-04-02 PROCEDURE — 97530 THERAPEUTIC ACTIVITIES: CPT

## 2025-04-02 PROCEDURE — G0378 HOSPITAL OBSERVATION PER HR: HCPCS

## 2025-04-02 PROCEDURE — 99232 SBSQ HOSP IP/OBS MODERATE 35: CPT | Performed by: FAMILY MEDICINE

## 2025-04-02 PROCEDURE — 97110 THERAPEUTIC EXERCISES: CPT

## 2025-04-02 PROCEDURE — 97535 SELF CARE MNGMENT TRAINING: CPT

## 2025-04-02 RX ORDER — HYDROCODONE BITARTRATE AND ACETAMINOPHEN 5; 325 MG/1; MG/1
1 TABLET ORAL EVERY 6 HOURS PRN
Refills: 0 | Status: DISCONTINUED | OUTPATIENT
Start: 2025-04-02 | End: 2025-04-04 | Stop reason: HOSPADM

## 2025-04-02 RX ADMIN — CETIRIZINE HYDROCHLORIDE 10 MG: 10 TABLET, FILM COATED ORAL at 09:32

## 2025-04-02 RX ADMIN — SUCRALFATE 1 G: 1 TABLET ORAL at 11:33

## 2025-04-02 RX ADMIN — SACUBITRIL AND VALSARTAN 1 TABLET: 24; 26 TABLET, FILM COATED ORAL at 09:31

## 2025-04-02 RX ADMIN — FLUOXETINE HYDROCHLORIDE 60 MG: 20 CAPSULE ORAL at 09:31

## 2025-04-02 RX ADMIN — HYDROCODONE BITARTRATE AND ACETAMINOPHEN 1 TABLET: 5; 325 TABLET ORAL at 11:33

## 2025-04-02 RX ADMIN — APIXABAN 5 MG: 5 TABLET, FILM COATED ORAL at 20:05

## 2025-04-02 RX ADMIN — ACETAMINOPHEN 500 MG: 500 TABLET ORAL at 09:32

## 2025-04-02 RX ADMIN — SUCRALFATE 1 G: 1 TABLET ORAL at 09:31

## 2025-04-02 RX ADMIN — APIXABAN 5 MG: 5 TABLET, FILM COATED ORAL at 09:31

## 2025-04-02 RX ADMIN — AMOXICILLIN AND CLAVULANATE POTASSIUM 1 TABLET: 875; 125 TABLET, FILM COATED ORAL at 09:31

## 2025-04-02 RX ADMIN — SPIRONOLACTONE 25 MG: 25 TABLET ORAL at 09:31

## 2025-04-02 RX ADMIN — AMOXICILLIN AND CLAVULANATE POTASSIUM 1 TABLET: 875; 125 TABLET, FILM COATED ORAL at 20:05

## 2025-04-02 RX ADMIN — EMPAGLIFLOZIN 25 MG: 25 TABLET, FILM COATED ORAL at 09:32

## 2025-04-02 RX ADMIN — HYDROCODONE BITARTRATE AND ACETAMINOPHEN 1 TABLET: 5; 325 TABLET ORAL at 20:06

## 2025-04-02 RX ADMIN — ATORVASTATIN CALCIUM 80 MG: 40 TABLET, FILM COATED ORAL at 20:06

## 2025-04-02 RX ADMIN — SACUBITRIL AND VALSARTAN 1 TABLET: 24; 26 TABLET, FILM COATED ORAL at 20:06

## 2025-04-02 RX ADMIN — LEVOTHYROXINE SODIUM 75 MCG: 0.07 TABLET ORAL at 05:42

## 2025-04-02 RX ADMIN — SUCRALFATE 1 G: 1 TABLET ORAL at 20:06

## 2025-04-02 NOTE — PLAN OF CARE
Goal Outcome Evaluation:  Plan of Care Reviewed With: patient        Progress: improving  Outcome Evaluation: Pt continues to present below fxl baseline, limited by poor coordination L>R, generalized weakness L>R, poor balance, and decreased activity tolerance. Pt ambulated to the bathroom for sink side grooming, requiring Agnieszka for bed mobility and Agnieszka-CGA for ambulating to bathroom. Pt was able to maintain stand for ~12 minutes, however required seated rest break before walking back to chair. Pt would benefit from ongoing skilled OT services to progress to PLOF. Rec d/c to IRF.    Anticipated Discharge Disposition (OT): inpatient rehabilitation facility

## 2025-04-02 NOTE — THERAPY TREATMENT NOTE
"Patient Name: Rehana Hamilton  : 1970    MRN: [id number]                              Today's Date: 2025       Admit Date: 3/29/2025    Visit Dx:     ICD-10-CM ICD-9-CM   1. Acute left-sided weakness  R53.1 728.87   2. Lethargy  R53.83 780.79   3. Headache, variant migraine  G43.809 346.20   4. Dysarthria  R47.1 784.51     Patient Active Problem List   Diagnosis    Allergic rhinitis    GERD (gastroesophageal reflux disease)    Hypertension    Migraine    Sleep apnea    Rheumatoid arthritis    Type 2 diabetes mellitus    Congestive heart failure    Chronic obstructive lung disease    Colon cancer    Hypothyroidism    Encounter for colonoscopy due to history of colon cancer    Gastroesophageal reflux disease    Personal history of colon cancer    Abdominal pain    Acute pulmonary edema    Blood in feces    Chronic nausea    Esophagitis, reflux    Medically noncompliant    Pedal edema    SOB (shortness of breath)    Pulmonary embolism with infarction    Cancer of transverse colon    Hypertension    TIA (transient ischemic attack)     Past Medical History:   Diagnosis Date    Allergic rhinitis     Cancer of transverse colon     Personal history of colon cancer    Colon cancer 10/11/2016    Congestive heart failure     COPD (chronic obstructive pulmonary disease)     Depression     Disease of thyroid gland     Esophagitis     GERD (gastroesophageal reflux disease)     History of degenerative disc disease     History of migraine     History of nuclear stress test 2017    \"IT WAS NORMAL\"    History of sleep apnea     NO CPAP    Hypertension     Personal history of congestive heart failure     PONV (postoperative nausea and vomiting)     Rheumatoid arthritis     Tattoo     X5    Type 2 diabetes mellitus      Past Surgical History:   Procedure Laterality Date    CHOLECYSTECTOMY      COLON RESECTION  2014    SECONDARY TO COLON CANCER    COLONOSCOPY N/A 10/2/2017    Procedure: DIAGNOSTIC COLONOSCOPY;  " Surgeon: Elina Summers MD;  Location: [facility] ENDOSCOPY;  Service:     COLONOSCOPY N/A 12/7/2018    Procedure: COLONOSCOPY;  Surgeon: Elina Summers MD;  Location: [facility] ENDOSCOPY;  Service: Gastroenterology    COLONOSCOPY N/A 9/11/2020    Procedure: COLONOSCOPY W/ COLD SNARE POLYPECTOMY;  Surgeon: Elina Summers MD;  Location: [facility] ENDOSCOPY;  Service: Gastroenterology;  Laterality: N/A;    COLONOSCOPY W/ BIOPSIES      COLONOSCOPY W/ POLYPECTOMY      DILATION AND CURETTAGE, DIAGNOSTIC / THERAPEUTIC      ENDOSCOPY N/A 10/2/2017    Procedure: ESOPHAGOGASTRODUODENOSCOPY WITH BIOPSY;  Surgeon: Elina Summers MD;  Location: [facility] ENDOSCOPY;  Service:     HYSTERECTOMY      Total Abdominal Hysterectomy with Removal of Both Ovaries    TUBAL ABDOMINAL LIGATION      UPPER GASTROINTESTINAL ENDOSCOPY      Diagnostic      General Information       Row Name 04/02/25 1513          OT Time and Intention    Document Type therapy note (daily note)  -AJ     Mode of Treatment occupational therapy  -       Row Name 04/02/25 1513          General Information    Patient Profile Reviewed yes  -AJ     Existing Precautions/Restrictions fall;other (see comments)  L side hemiparesis  -AJ     Barriers to Rehab previous functional deficit;medically complex;visual deficit  -AJ       Row Name 04/02/25 1513          Cognition    Orientation Status (Cognition) oriented x 3  -       Row Name 04/02/25 1513          Safety Issues/Impairments Affecting Functional Mobility    Safety Issues Affecting Function (Mobility) awareness of need for assistance;insight into deficits/self-awareness;safety precaution awareness;safety precautions follow-through/compliance;sequencing abilities  -     Impairments Affecting Function (Mobility) balance;coordination;endurance/activity tolerance;grasp;motor planning;motor control;pain;sensation/sensory awareness;strength;visual/perceptual  -AJ               User Key  (r) =  Recorded By, (t) = Taken By, (c) = Cosigned By      Initials Name Provider Type    Latosha Ling OT Occupational Therapist                     Mobility/ADL's       Row Name 04/02/25 1514          Bed Mobility    Bed Mobility supine-sit;scooting/bridging  -     Scooting/Bridging [place] (Bed Mobility) contact guard;1 person assist  -     Supine-Sit [place] (Bed Mobility) minimum assist (75% patient effort);1 person assist;verbal cues;nonverbal cues (demo/gesture)  -     Bed Mobility, Safety Issues decreased use of arms for pushing/pulling;decreased use of legs for bridging/pushing;impaired trunk control for bed mobility  -     Assistive Device (Bed Mobility) bed rails;head of bed elevated;repositioning sheet  -     Comment, (Bed Mobility) Assist with trunk to rise to upright position, able to scoot out independently once upright.  -       Row Name 04/02/25 1514          Transfers    Transfers sit-stand transfer;toilet transfer;stand-sit transfer  -       Row Name 04/02/25 1514          Sit-Stand Transfer    Sit-Stand [place] (Transfers) contact guard;1 person assist;verbal cues  -DIETER     Assistive Device (Sit-Stand Transfers) walker, front-wheeled  -DIETER     Comment, (Sit-Stand Transfer) STS x2: EOB and BSC. Cues for HP with walker use and improving body mechanics.  -       Row Name 04/02/25 1514          Stand-Sit Transfer    Stand-Sit [place] (Transfers) contact guard;1 person assist;verbal cues  -DIETER     Assistive Device (Stand-Sit Transfers) walker, front-wheeled  -DIETER     Comment, (Stand-Sit Transfer) Cues for alignment and eccentric lowering to chair  -       Row Name 04/02/25 1514          Toilet Transfer    Type (Toilet Transfer) sit-stand;stand-sit  -AJ     [place] Level (Toilet Transfer) contact guard;1 person assist;verbal cues  -DIETER     Assistive Device (Toilet Transfer) commode chair;walker, front-wheeled  -AJ     Comment, (Toilet Transfer) Cues for eccentric  lowering and alignment  -[facility] Name 04/02/25 1514          Functional Mobility    Functional Mobility- Ind. Level minimum assist (75% patient effort);1 person;verbal cues required  -     Functional Mobility- Device walker, front-wheeled  -     Functional Mobility-Distance (Feet) --  <HH distance  -     Functional Mobility- Comment Mostly CGA for ambulation, x2 minor LOB requiring Agnieszka for correcting. Additional cues for flexed posturing.  -[facility] Name 04/02/25 1514          Activities of Daily Living    BADL Assessment/Intervention lower body dressing;grooming  -[facility] Name 04/02/25 1514          Lower Body Dressing Assessment/Training    [place] Level (Lower Body Dressing) don;socks;standby assist  -     Position (Lower Body Dressing) sitting up in bed  -     Comment, (Lower Body Dressing) Pt able to get into figure 4 position to adjust socks  -[facility] Name 04/02/25 1514          Grooming Assessment/Training    [place] Level (Grooming) oral care regimen;wash face, hands;contact guard assist  -     Position (Grooming) sink side;supported standing  -               User Key  (r) = Recorded By, (t) = Taken By, (c) = Cosigned By      Initials Name Provider Type     Latosha Levi OT Occupational Therapist                   Obj/Interventions       [facility] Name 04/02/25 1522          Elbow/Forearm (Therapeutic Exercise)    Elbow/Forearm (Therapeutic Exercise) AROM (active range of motion)  -     Elbow/Forearm AROM (Therapeutic Exercise) bilateral;flexion;extension;supination;pronation;sitting;10 second hold  -[facility] Name 04/02/25 1522          Wrist (Therapeutic Exercise)    Wrist (Therapeutic Exercise) AROM (active range of motion)  -     Wrist AROM (Therapeutic Exercise) bilateral;flexion;extension;10 repetitions  -[facility] Name 04/02/25 1522          Hand (Therapeutic Exercise)    Hand (Therapeutic Exercise) AROM (active range of motion)  -     Hand  AROM/AAROM (Therapeutic Exercise) bilateral;AROM (active range of motion);finger flexion;finger extension;finger aBduction;finger aDduction;10 repetitions  -       Row Name 04/02/25 1522          Motor Skills    Therapeutic Exercise wrist;hand;elbow/forearm  -       Row Name 04/02/25 1522          Balance    Balance Assessment sitting static balance;sitting dynamic balance;sit to stand dynamic balance;standing dynamic balance;standing static balance  -     Static Sitting Balance standby assist  -     Dynamic Sitting Balance standby assist  -     Position, Sitting Balance unsupported;sitting edge of bed  -     Static Standing Balance contact guard  -     Dynamic Standing Balance minimal assist;1-person assist;verbal cues  -     Position/Device Used, Standing Balance supported;walker, front-wheeled  -     Balance Interventions sitting;standing;sit to stand;supported;dynamic;static;occupation based/functional task  -               User Key  (r) = Recorded By, (t) = Taken By, (c) = Cosigned By      Initials Name Provider Type    AJ Latosha Levi OT Occupational Therapist                   Goals/Plan    No documentation.                  Clinical Impression       Row Name 04/02/25 1524          Pain Assessment    Pretreatment Pain Rating 6/10  -     Posttreatment Pain Rating 6/10  -     Pain Location head  -     Pain Side/Orientation generalized  -     Pain Management Interventions exercise or physical activity utilized;positioning techniques utilized  -     Response to Pain Interventions activity participation with tolerable pain  -       Row Name 04/02/25 1524          Plan of Care Review    Plan of Care Reviewed With patient  -     Progress improving  -     Outcome Evaluation Pt continues to present below fxl baseline, limited by poor coordination L>R, generalized weakness L>R, poor balance, and decreased activity tolerance. Pt ambulated to the bathroom for sink side grooming,  requiring Agnieszka for bed mobility and Agnieszka-CGA for ambulating to bathroom. Pt was able to maintain stand for ~12 minutes, however required seated rest break before walking back to chair. Pt would benefit from ongoing skilled OT services to progress to PLOF. Rec d/c to IRF.  -       Row Name 04/02/25 1524          Therapy Plan Review/Discharge Plan (OT)    Anticipated Discharge Disposition (OT) inpatient rehabilitation facility  -       Row Name 04/02/25 1524          Vital Signs    Pre Systolic BP Rehab 140  -AJ     Pre Treatment Diastolic BP 91  -AJ     Post Systolic BP Rehab 136  -AJ     Post Treatment Diastolic BP 89  -AJ     Pretreatment Heart Rate (beats/min) 70  -AJ     Intratreatment Heart Rate (beats/min) 97  -AJ     Posttreatment Heart Rate (beats/min) 73  -AJ     O2 Delivery Pre Treatment room air  -AJ     O2 Delivery Intra Treatment room air  -AJ     O2 Delivery Post Treatment room air  -AJ     Pre Patient Position Supine  -AJ     Intra Patient Position Standing  -AJ     Post Patient Position Sitting  -AJ       Row Name 04/02/25 1524          Positioning and Restraints    Pre-Treatment Position in bed  -AJ     Post Treatment Position chair  -AJ     In Chair notified nsg;reclined;sitting;call light within reach;encouraged to call for assist;exit alarm on;legs elevated;waffle cushion;on mechanical lift sling  -AJ               User Key  (r) = Recorded By, (t) = Taken By, (c) = Cosigned By      Initials Name Provider Type    Latosha Ling, OT Occupational Therapist                   Outcome Measures       Row Name 04/02/25 1538          How much help from another is currently needed...    Putting on and taking off regular lower body clothing? 4  -AJ     Bathing (including washing, rinsing, and drying) 3  -AJ     Toileting (which includes using toilet bed pan or urinal) 3  -AJ     Putting on and taking off regular upper body clothing 3  -AJ     Taking care of personal grooming (such as brushing teeth)  3  -     Eating meals 3  -     AM-[facility] 6 Clicks Score (OT) 19  -       Row Name 04/02/25 1538          Functional Assessment    Outcome Measure Options AM-PAC 6 Clicks Daily Activity (OT)  -               User Key  (r) = Recorded By, (t) = Taken By, (c) = Cosigned By      Initials Name Provider Type    Latosha Ling OT Occupational Therapist                    Occupational Therapy Education       Title: PT OT SLP Therapies (In Progress)       Topic: Occupational Therapy (Done)       Point: ADL training (Done)       Learning Progress Summary            Patient Acceptance, E, VU by AJ at 4/2/2025 1539    Acceptance, E,D, VU by AR at 3/30/2025 1141                      Point: Home exercise program (Done)       Learning Progress Summary            Patient Acceptance, E,D, VU by AR at 3/30/2025 1141                      Point: Precautions (Done)       Learning Progress Summary            Patient Acceptance, E, VU by AJ at 4/2/2025 1539    Acceptance, E,D, VU by AR at 3/30/2025 1141                      Point: Body mechanics (Done)       Learning Progress Summary            Patient Acceptance, E, VU by  at 4/2/2025 1539    Acceptance, E,D, VU by AR at 3/30/2025 1141                                      User Key       Initials Effective Dates Name Provider Type Discipline    AR 07/11/23 -  Charissa Crawford, OT Occupational Therapist OT    DIETER 08/26/24 -  Latosha Levi OT Occupational Therapist OT                  OT Recommendation and Plan     Plan of Care Review  Plan of Care Reviewed With: patient  Progress: improving  Outcome Evaluation: Pt continues to present below fxl baseline, limited by poor coordination L>R, generalized weakness L>R, poor balance, and decreased activity tolerance. Pt ambulated to the bathroom for sink side grooming, requiring Agnieszka for bed mobility and Agnieszka-CGA for ambulating to bathroom. Pt was able to maintain stand for ~12 minutes, however required seated rest break before  walking back to chair. Pt would benefit from ongoing skilled OT services to progress to PLOF. Rec d/c to IRF.     Time Calculation:         Time Calculation- OT       Row Name 04/02/25 1540             Time Calculation- OT    OT Start Time 1430  -AJ      OT Received On 04/02/25  -      OT Goal Re-Cert Due Date 04/09/25  -         Timed Charges    36197 - OT Therapeutic Exercise Minutes 8  -AJ      06787 - OT Therapeutic Activity Minutes 15  -AJ      20495 - OT Self Care/Mgmt Minutes 15  -AJ         Total Minutes    Timed Charges Total Minutes 38  -       Total Minutes 38  -AJ                User Key  (r) = Recorded By, (t) = Taken By, (c) = Cosigned By      Initials Name Provider Type    AJ Latosha Levi OT Occupational Therapist                  Therapy Charges for Today       Code Description Service Date Service Provider Modifiers Qty    84296930297 HC OT THER PROC EA 15 MIN 4/2/2025 Latosha Levi OT GO 1    89335980476 HC OT THERAPEUTIC ACT EA 15 MIN 4/2/2025 Latosha Levi OT GO 1    02354308113 HC OT SELF CARE/MGMT/TRAIN EA 15 MIN 4/2/2025 Latosha Levi OT GO 1                 Latosha Levi OT  4/2/2025
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
## 2025-04-02 NOTE — CASE MANAGEMENT/SOCIAL WORK
Continued Stay Note  [facility]     Patient Name: Rehana Hamilton  MRN: [id number]  Today's Date: 4/2/2025    Admit Date: 3/29/2025    Plan: IDP   Discharge Plan       Row Name 04/02/25 1410       Plan    Plan Comments Per [facility] precert pending for acute rehab.                   Discharge Codes    No documentation.                 Expected Discharge Date and Time       Expected Discharge Date Expected Discharge Time    Apr 2, 2025               Alecia Guzman RN

## 2025-04-02 NOTE — PLAN OF CARE
Goal Outcome Evaluation:  Plan of Care Reviewed With: patient        Progress: improving  Outcome Evaluation: a/o x 4; VSS, RA; no acute changes to report from overnight; no acute changes to report; turned q2h; neuro checks unchanged                     Problem: Adult Inpatient Plan of Care  Goal: Absence of Hospital-Acquired Illness or Injury  Intervention: Identify and Manage Fall Risk  Recent Flowsheet Documentation  Taken 4/2/2025 0600 by Steve Braun, RN  Safety Promotion/Fall Prevention:   assistive device/personal items within reach   clutter free environment maintained   activity supervised   fall prevention program maintained   nonskid shoes/slippers when out of bed   room organization consistent   safety round/check completed  Taken 4/2/2025 0400 by Steve Braun, RN  Safety Promotion/Fall Prevention:   assistive device/personal items within reach   clutter free environment maintained   activity supervised   fall prevention program maintained   nonskid shoes/slippers when out of bed   room organization consistent   safety round/check completed  Taken 4/2/2025 0200 by Steve Braun RN  Safety Promotion/Fall Prevention:   activity supervised   assistive device/personal items within reach   clutter free environment maintained   fall prevention program maintained   nonskid shoes/slippers when out of bed   room organization consistent   safety round/check completed  Taken 4/2/2025 0000 by Steve Braun RN  Safety Promotion/Fall Prevention:   assistive device/personal items within reach   clutter free environment maintained   activity supervised   fall prevention program maintained   nonskid shoes/slippers when out of bed   room organization consistent   safety round/check completed  Taken 4/1/2025 2200 by Steve Braun RN  Safety Promotion/Fall Prevention:   activity supervised   assistive device/personal items within reach   clutter free environment maintained   fall prevention  program maintained   nonskid shoes/slippers when out of bed   room organization consistent   safety round/check completed  Taken 4/1/2025 2037 by Steve Braun RN  Safety Promotion/Fall Prevention:   activity supervised   assistive device/personal items within reach   clutter free environment maintained   fall prevention program maintained   nonskid shoes/slippers when out of bed   room organization consistent   safety round/check completed     Problem: Adult Inpatient Plan of Care  Goal: Absence of Hospital-Acquired Illness or Injury  Intervention: Prevent Skin Injury  Recent Flowsheet Documentation  Taken 4/2/2025 0600 by Steve Braun RN  Body Position:   turned   supine  Skin Protection:   incontinence pads utilized   silicone foam dressing in place   transparent dressing maintained  Taken 4/2/2025 0400 by Steve Braun RN  Body Position:   turned   right  Skin Protection:   incontinence pads utilized   silicone foam dressing in place   transparent dressing maintained  Taken 4/2/2025 0200 by Steve Braun RN  Body Position:   turned   left  Skin Protection:   incontinence pads utilized   silicone foam dressing in place   transparent dressing maintained  Taken 4/2/2025 0000 by Steve Braun RN  Body Position:   turned   supine  Skin Protection:   incontinence pads utilized   silicone foam dressing in place   transparent dressing maintained  Taken 4/1/2025 2200 by Steve Braun RN  Body Position:   turned   right  Skin Protection:   incontinence pads utilized   silicone foam dressing in place   skin sealant/moisture barrier applied   transparent dressing maintained  Taken 4/1/2025 2037 by Steve Braun RN  Body Position:   turned   left   position maintained  Skin Protection:   incontinence pads utilized   silicone foam dressing in place   transparent dressing maintained

## 2025-04-03 LAB
GLUCOSE BLDC GLUCOMTR-MCNC: 106 MG/DL (ref 70–130)
GLUCOSE BLDC GLUCOMTR-MCNC: 110 MG/DL (ref 70–130)
GLUCOSE BLDC GLUCOMTR-MCNC: 144 MG/DL (ref 70–130)
GLUCOSE BLDC GLUCOMTR-MCNC: 149 MG/DL (ref 70–130)

## 2025-04-03 PROCEDURE — 97116 GAIT TRAINING THERAPY: CPT

## 2025-04-03 PROCEDURE — 97110 THERAPEUTIC EXERCISES: CPT

## 2025-04-03 PROCEDURE — 82948 REAGENT STRIP/BLOOD GLUCOSE: CPT

## 2025-04-03 PROCEDURE — G0378 HOSPITAL OBSERVATION PER HR: HCPCS

## 2025-04-03 PROCEDURE — 99232 SBSQ HOSP IP/OBS MODERATE 35: CPT | Performed by: NURSE PRACTITIONER

## 2025-04-03 RX ADMIN — SACUBITRIL AND VALSARTAN 1 TABLET: 24; 26 TABLET, FILM COATED ORAL at 08:36

## 2025-04-03 RX ADMIN — HYDROCODONE BITARTRATE AND ACETAMINOPHEN 1 TABLET: 5; 325 TABLET ORAL at 22:39

## 2025-04-03 RX ADMIN — APIXABAN 5 MG: 5 TABLET, FILM COATED ORAL at 08:38

## 2025-04-03 RX ADMIN — AMOXICILLIN AND CLAVULANATE POTASSIUM 1 TABLET: 875; 125 TABLET, FILM COATED ORAL at 08:38

## 2025-04-03 RX ADMIN — ACETAMINOPHEN 500 MG: 500 TABLET ORAL at 11:38

## 2025-04-03 RX ADMIN — FLUOXETINE HYDROCHLORIDE 60 MG: 20 CAPSULE ORAL at 08:38

## 2025-04-03 RX ADMIN — SUCRALFATE 1 G: 1 TABLET ORAL at 22:39

## 2025-04-03 RX ADMIN — SUCRALFATE 1 G: 1 TABLET ORAL at 08:36

## 2025-04-03 RX ADMIN — SUCRALFATE 1 G: 1 TABLET ORAL at 11:38

## 2025-04-03 RX ADMIN — SACUBITRIL AND VALSARTAN 1 TABLET: 24; 26 TABLET, FILM COATED ORAL at 22:39

## 2025-04-03 RX ADMIN — LEVOTHYROXINE SODIUM 75 MCG: 0.07 TABLET ORAL at 06:04

## 2025-04-03 RX ADMIN — SPIRONOLACTONE 25 MG: 25 TABLET ORAL at 08:36

## 2025-04-03 RX ADMIN — ATORVASTATIN CALCIUM 80 MG: 40 TABLET, FILM COATED ORAL at 22:38

## 2025-04-03 RX ADMIN — SUCRALFATE 1 G: 1 TABLET ORAL at 17:26

## 2025-04-03 RX ADMIN — AMOXICILLIN AND CLAVULANATE POTASSIUM 1 TABLET: 875; 125 TABLET, FILM COATED ORAL at 22:39

## 2025-04-03 RX ADMIN — EMPAGLIFLOZIN 25 MG: 25 TABLET, FILM COATED ORAL at 08:38

## 2025-04-03 RX ADMIN — CETIRIZINE HYDROCHLORIDE 10 MG: 10 TABLET, FILM COATED ORAL at 08:35

## 2025-04-03 RX ADMIN — APIXABAN 5 MG: 5 TABLET, FILM COATED ORAL at 22:39
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
## 2025-04-03 NOTE — THERAPY TREATMENT NOTE
"Patient Name: Rehana Hamilton  : 1970    MRN: [id number]                              Today's Date: 4/3/2025       Admit Date: 3/29/2025    Visit Dx:     ICD-10-CM ICD-9-CM   1. Acute left-sided weakness  R53.1 728.87   2. Lethargy  R53.83 780.79   3. Headache, variant migraine  G43.809 346.20   4. Dysarthria  R47.1 784.51     Patient Active Problem List   Diagnosis    Allergic rhinitis    GERD (gastroesophageal reflux disease)    Hypertension    Migraine    Sleep apnea    Rheumatoid arthritis    Type 2 diabetes mellitus    Congestive heart failure    Chronic obstructive lung disease    Colon cancer    Hypothyroidism    Encounter for colonoscopy due to history of colon cancer    Gastroesophageal reflux disease    Personal history of colon cancer    Abdominal pain    Acute pulmonary edema    Blood in feces    Chronic nausea    Esophagitis, reflux    Medically noncompliant    Pedal edema    SOB (shortness of breath)    Pulmonary embolism with infarction    Cancer of transverse colon    Hypertension    TIA (transient ischemic attack)     Past Medical History:   Diagnosis Date    Allergic rhinitis     Cancer of transverse colon     Personal history of colon cancer    Colon cancer 10/11/2016    Congestive heart failure     COPD (chronic obstructive pulmonary disease)     Depression     Disease of thyroid gland     Esophagitis     GERD (gastroesophageal reflux disease)     History of degenerative disc disease     History of migraine     History of nuclear stress test 2017    \"IT WAS NORMAL\"    History of sleep apnea     NO CPAP    Hypertension     Personal history of congestive heart failure     PONV (postoperative nausea and vomiting)     Rheumatoid arthritis     Tattoo     X5    Type 2 diabetes mellitus      Past Surgical History:   Procedure Laterality Date    CHOLECYSTECTOMY      COLON RESECTION  2014    SECONDARY TO COLON CANCER    COLONOSCOPY N/A 10/2/2017    Procedure: DIAGNOSTIC COLONOSCOPY;  " Surgeon: Elina Summers MD;  Location: [facility] ENDOSCOPY;  Service:     COLONOSCOPY N/A 12/7/2018    Procedure: COLONOSCOPY;  Surgeon: Elina Summers MD;  Location: [facility] ENDOSCOPY;  Service: Gastroenterology    COLONOSCOPY N/A 9/11/2020    Procedure: COLONOSCOPY W/ COLD SNARE POLYPECTOMY;  Surgeon: Elina Summers MD;  Location: [facility] ENDOSCOPY;  Service: Gastroenterology;  Laterality: N/A;    COLONOSCOPY W/ BIOPSIES      COLONOSCOPY W/ POLYPECTOMY      DILATION AND CURETTAGE, DIAGNOSTIC / THERAPEUTIC      ENDOSCOPY N/A 10/2/2017    Procedure: ESOPHAGOGASTRODUODENOSCOPY WITH BIOPSY;  Surgeon: Elina Summers MD;  Location: [facility] ENDOSCOPY;  Service:     HYSTERECTOMY      Total Abdominal Hysterectomy with Removal of Both Ovaries    TUBAL ABDOMINAL LIGATION      UPPER GASTROINTESTINAL ENDOSCOPY      Diagnostic      General Information       Row Name 04/03/25 1612          Physical Therapy Time and Intention    Document Type therapy note (daily note)  -CK     Mode of Treatment physical therapy;individual therapy  -CK       Row Name 04/03/25 1612          General Information    Patient Profile Reviewed yes  -CK     Existing Precautions/Restrictions fall;other (see comments)  L sided weakness  -CK     Barriers to Rehab medically complex;previous functional deficit;visual deficit  -CK       Row Name 04/03/25 1612          Cognition    Orientation Status (Cognition) oriented x 3  -CK       Row Name 04/03/25 1612          Safety Issues/Impairments Affecting Functional Mobility    Safety Issues Affecting Function (Mobility) awareness of need for assistance;insight into deficits/self-awareness;safety precaution awareness;safety precautions follow-through/compliance;positioning of assistive device  -CK     Impairments Affecting Function (Mobility) balance;coordination;endurance/activity tolerance;grasp;motor planning;motor control;pain;sensation/sensory awareness;strength;visual/perceptual   -CK               User Key  (r) = Recorded By, (t) = Taken By, (c) = Cosigned By      Initials Name Provider Type    Jennyfer Marin PT Physical Therapist                   Mobility       Row Name 04/03/25 1612          Bed Mobility    Bed Mobility supine-sit;sit-supine  -CK     Supine-Sit Dane (Bed Mobility) contact guard  -CK     Sit-Supine Dane (Bed Mobility) minimum assist (75% patient effort);1 person assist  -CK     Assistive Device (Bed Mobility) bed rails;head of bed elevated  -CK     Comment, (Bed Mobility) Agnieszka at LLE for sit to sup  -CK       Row Name 04/03/25 1612          Sit-Stand Transfer    Sit-Stand Dane (Transfers) contact guard;1 person assist;verbal cues  -CK     Assistive Device (Sit-Stand Transfers) walker, front-wheeled  -CK       Row Name 04/03/25 1612          Gait/Stairs (Locomotion)    Dane Level (Gait) minimum assist (75% patient effort);1 person assist;verbal cues  -CK     Assistive Device (Gait) walker, front-wheeled  -CK     Distance in Feet (Gait) 36  -CK     Deviations/Abnormal Patterns (Gait) bilateral deviations;finesse decreased;gait speed decreased;stride length decreased  -CK     Left Sided Gait Deviations foot drop/toe drag;weight shift ability decreased  -CK     Comment, (Gait/Stairs) Patient ambulated with step through gait pattern with slow pace and decreased weight acceptance onto LLE. Trialed DF wrap on L foot but patient still with some difficulty clearing he LLE. She may benefit from stronger assist of a thick theraband wrap.  -CK               User Key  (r) = Recorded By, (t) = Taken By, (c) = Cosigned By      Initials Name Provider Type    Jennyfer Marin PT Physical Therapist                   Obj/Interventions       Row Name 04/03/25 1615          Motor Skills    Therapeutic Exercise other (see comments)  patient reports compliance with isometrics, ankle pumps, and hip abduction as able. Issued her a sponge for her L hand   -CK       Row Name 04/03/25 1615          Balance    Balance Assessment sitting static balance;standing static balance;standing dynamic balance  -CK     Static Sitting Balance standby assist  -CK     Position, Sitting Balance unsupported;sitting edge of bed  -CK     Static Standing Balance contact guard  -CK     Dynamic Standing Balance minimal assist  -CK     Position/Device Used, Standing Balance supported;walker, front-wheeled  -CK               User Key  (r) = Recorded By, (t) = Taken By, (c) = Cosigned By      Initials Name Provider Type    CK Jennyfer Chaney PT Physical Therapist                   Goals/Plan    No documentation.                  Clinical Impression       Row Name 04/03/25 1616          Pain    Pain Location head  -CK     Pain Side/Orientation generalized  -CK       Row Name 04/03/25 1616          Pain Scale: FACES Pre/Post-Treatment    Pain: FACES Scale, Pretreatment 2-->hurts little bit  -CK     Posttreatment Pain Rating 2-->hurts little bit  -CK       Row Name 04/03/25 1616          Plan of Care Review    Plan of Care Reviewed With patient  -CK     Progress improving  -CK     Outcome Evaluation Patient able to progress her ambulation distance to 36' Agnieszka with FWW. Trialed DFA wrap today with ace wrap, however patient may benefit from a stronger theraband wrap. IPPT remains indicated to address current deficits. Continue to recommend D/C to IPR for best functional outcomes.  -CK       Row Name 04/03/25 1616          Vital Signs    Pre Systolic BP Rehab 140  -CK     Pre Treatment Diastolic BP 92  -CK     Pretreatment Heart Rate (beats/min) 71  -CK     Posttreatment Heart Rate (beats/min) 64  -CK     Pre SpO2 (%) 97  -CK     O2 Delivery Pre Treatment room air  -CK     O2 Delivery Intra Treatment room air  -CK     Post SpO2 (%) 98  -CK     O2 Delivery Post Treatment room air  -CK     Pre Patient Position Supine  -CK     Post Patient Position Supine  -CK       Row Name 04/03/25 1616           Positioning and Restraints    Pre-Treatment Position in bed  -CK     Post Treatment Position bed  -CK     In Bed fowlers;call light within reach;encouraged to call for assist;exit alarm on;heels elevated;notified nsg  -CK               User Key  (r) = Recorded By, (t) = Taken By, (c) = Cosigned By      Initials Name Provider Type    Jennyfer Marin PT Physical Therapist                   Outcome Measures       Row Name 04/03/25 1618 04/03/25 0930       How much help from another person do you currently need...    Turning from your back to your side while in flat bed without using bedrails? 4  -CK 4  -KO    Moving from lying on back to sitting on the side of a flat bed without bedrails? 3  -CK 3  -KO    Moving to and from a bed to a chair (including a wheelchair)? 3  -CK 3  -KO    Standing up from a chair using your arms (e.g., wheelchair, bedside chair)? 3  -CK 3  -KO    Climbing 3-5 steps with a railing? 2  -CK 2  -KO    To walk in hospital room? 3  -CK 3  -KO    AM-PAC 6 Clicks Score (PT) 18  -CK 18  -KO    Highest Level of Mobility Goal 6 --> Walk 10 steps or more  -CK 6 --> Walk 10 steps or more  -KO      Row Name 04/03/25 1618          Functional Assessment    Outcome Measure Options AM-PAC 6 Clicks Basic Mobility (PT)  -CK               User Key  (r) = Recorded By, (t) = Taken By, (c) = Cosigned By      Initials Name Provider Type    Ankita Hayes, RN Registered Nurse    Jennyfer Marin PT Physical Therapist                                 Physical Therapy Education       Title: PT OT SLP Therapies (In Progress)       Topic: Physical Therapy (Done)       Point: Mobility training (Done)       Learning Progress Summary            Patient Acceptance, E, VU by CK at 4/3/2025 1618    Acceptance, E, VU by RYAN at 4/1/2025 1446    Eagbharath, HOLA PALMER,EMANUEL,NR by  at 3/30/2025 1144    Comment: Educated pt. safety/technique w/bed mobility, transfers, PT POC                      Point: Home exercise program  (Done)       Learning Progress Summary            Patient Acceptance, E, VU by CK at 4/3/2025 1618                      Point: Body mechanics (Done)       Learning Progress Summary            Patient Acceptance, E, VU by CK at 4/3/2025 1618    Acceptance, E, VU by KR at 4/1/2025 1446    Eager, E, VU,DU,NR by  at 3/30/2025 1144    Comment: Educated pt. safety/technique w/bed mobility, transfers, PT POC                      Point: Precautions (Done)       Learning Progress Summary            Patient Acceptance, E, VU by CK at 4/3/2025 1618    Acceptance, E, VU by KR at 4/1/2025 1446    Eager, E, VU,DU,NR by  at 3/30/2025 1144    Comment: Educated pt. safety/technique w/bed mobility, transfers, PT POC                                      User Key       Initials Effective Dates Name Provider Type Discipline     06/01/21 -  Estelle Rangel, PT Physical Therapist PT     02/06/24 -  Jennyfer Chaney, PT Physical Therapist PT     12/30/22 -  Sia King, PT Physical Therapist PT                  PT Recommendation and Plan     Progress: improving  Outcome Evaluation: Patient able to progress her ambulation distance to 36' Agnieszka with FWW. Trialed DFA wrap today with ace wrap, however patient may benefit from a stronger theraband wrap. IPPT remains indicated to address current deficits. Continue to recommend D/C to IPR for best functional outcomes.     Time Calculation:         PT Charges       Row Name 04/03/25 1618             Time Calculation    Start Time 1539  -CK      PT Received On 04/03/25  -CK         Timed Charges    01649 - PT Therapeutic Exercise Minutes 8  -CK      92167 - Gait Training Minutes  10  -CK      33161 - PT Therapeutic Activity Minutes 5  -CK         Total Minutes    Timed Charges Total Minutes 23  -CK       Total Minutes 23  -CK                User Key  (r) = Recorded By, (t) = Taken By, (c) = Cosigned By      Initials Name Provider Type    CK Jennyfer Chaney, PT Physical Therapist                   Therapy Charges for Today       Code Description Service Date Service Provider Modifiers Qty    [id number]  PT THER PROC EA 15 MIN 4/3/2025 Jennyfer Chaney, PT GP 1    [id number] HC GAIT TRAINING EA 15 MIN 4/3/2025 Jennyfer Chaney, PT GP 1            PT G-Codes  Outcome Measure Options: AM-PAC 6 Clicks Basic Mobility (PT)  AM-PAC 6 Clicks Score (PT): 18  AM-PAC 6 Clicks Score (OT): 19  Modified Cheswick Scale: 4 - Moderately severe disability.  Unable to walk without assistance, and unable to attend to own bodily needs without assistance.  PT Discharge Summary  Anticipated Discharge Disposition (PT): inpatient rehabilitation facility    Jennyfer Chaney PT  4/3/2025

## 2025-04-03 NOTE — CASE MANAGEMENT/SOCIAL WORK
Continued Stay Note  [facility]     Patient Name: Rehana Hamilton  MRN: [id number]  Today's Date: 4/3/2025    Admit Date: 3/29/2025    Plan: TBD   Discharge Plan       Row Name 04/03/25 0943       Plan    Plan TBD    Patient/Family in Agreement with Plan yes    Plan Comments Spoke with patient at bedside this am. Informed pt that her precert for [facility] is still pending as of this am and CM will cont to follow. Verbal understanding given.                   Discharge Codes    No documentation.                 Expected Discharge Date and Time       Expected Discharge Date Expected Discharge Time    Apr 2, 2025               Alecia Guzman RN

## 2025-04-03 NOTE — PLAN OF CARE
Goal Outcome Evaluation:  Plan of Care Reviewed With: patient        Progress: improving  Outcome Evaluation: Patient able to progress her ambulation distance to 36' Agnieszka with FWW. Trialed DFA wrap today with ace wrap, however patient may benefit from a stronger theraband wrap. IPPT remains indicated to address current deficits. Continue to recommend D/C to IPR for best functional outcomes.    Anticipated Discharge Disposition (PT): inpatient rehabilitation facility                         None

## 2025-04-04 VITALS
HEART RATE: 77 BPM | RESPIRATION RATE: 16 BRPM | HEIGHT: 64 IN | WEIGHT: 205.03 LBS | SYSTOLIC BLOOD PRESSURE: 144 MMHG | TEMPERATURE: 98.3 F | BODY MASS INDEX: 35 KG/M2 | DIASTOLIC BLOOD PRESSURE: 73 MMHG | OXYGEN SATURATION: 94 %

## 2025-04-04 PROBLEM — G45.9 TIA (TRANSIENT ISCHEMIC ATTACK): Status: RESOLVED | Noted: 2025-03-29 | Resolved: 2025-04-04

## 2025-04-04 LAB
GLUCOSE BLDC GLUCOMTR-MCNC: 103 MG/DL (ref 70–130)
GLUCOSE BLDC GLUCOMTR-MCNC: 136 MG/DL (ref 70–130)

## 2025-04-04 PROCEDURE — 99239 HOSP IP/OBS DSCHRG MGMT >30: CPT | Performed by: NURSE PRACTITIONER

## 2025-04-04 PROCEDURE — 82948 REAGENT STRIP/BLOOD GLUCOSE: CPT

## 2025-04-04 PROCEDURE — G0378 HOSPITAL OBSERVATION PER HR: HCPCS

## 2025-04-04 RX ORDER — SUCRALFATE 1 G/1
1 TABLET ORAL
Start: 2025-04-04

## 2025-04-04 RX ORDER — ACETAMINOPHEN 500 MG
500 TABLET ORAL EVERY 6 HOURS PRN
Start: 2025-04-04

## 2025-04-04 RX ADMIN — APIXABAN 5 MG: 5 TABLET, FILM COATED ORAL at 09:13

## 2025-04-04 RX ADMIN — LEVOTHYROXINE SODIUM 75 MCG: 0.07 TABLET ORAL at 05:16

## 2025-04-04 RX ADMIN — SUCRALFATE 1 G: 1 TABLET ORAL at 09:14

## 2025-04-04 RX ADMIN — SACUBITRIL AND VALSARTAN 1 TABLET: 24; 26 TABLET, FILM COATED ORAL at 09:13

## 2025-04-04 RX ADMIN — SPIRONOLACTONE 25 MG: 25 TABLET ORAL at 09:14

## 2025-04-04 RX ADMIN — FLUOXETINE HYDROCHLORIDE 60 MG: 20 CAPSULE ORAL at 09:14

## 2025-04-04 RX ADMIN — CETIRIZINE HYDROCHLORIDE 10 MG: 10 TABLET, FILM COATED ORAL at 09:13

## 2025-04-04 RX ADMIN — EMPAGLIFLOZIN 25 MG: 25 TABLET, FILM COATED ORAL at 09:14

## 2025-04-04 RX ADMIN — AMOXICILLIN AND CLAVULANATE POTASSIUM 1 TABLET: 875; 125 TABLET, FILM COATED ORAL at 09:13

## 2025-04-04 NOTE — DISCHARGE PLACEMENT REQUEST
"Rehana Hamilton (54 y.o. Female)     Alecia [phone]      Date of Birth   1970    Social Security Number       Address   [address] [address]    Home Phone   [phone]    MRN   [id number]       Baptism   Patient Refused    Marital Status                               Admission Date   3/29/2025    Admission Type   Emergency    Admitting Provider   AMARJIT Carl DO    Attending Provider   AMARJIT Carl DO    Department, Room/Bed   [facility] 3F, S326/1       Discharge Date       Discharge Disposition   Rehab Facility or Unit (DC - External)    Discharge Destination                                 Attending Provider: AMARJIT Carl DO    Allergies: Ciprofloxacin    Isolation: None   Infection: None   Code Status: CPR    Ht: 162 cm (63.78\")   Wt: 93 kg (205 lb 0.4 oz)    Admission Cmt: None   Principal Problem: TIA (transient ischemic attack) [G45.9]                   Active Insurance as of 3/29/2025       Primary Coverage       Payor Plan Insurance Group Employer/Plan Group    [facility] BY NICOLAS [facility] BY NICOLAS [id number]       Payor Plan Address Payor Plan Phone Number Payor Plan Fax Number Effective Dates    [address] [address]   2021 - None Entered    [facility] [phone]         Subscriber Name Subscriber Birth Date Member ID       REHANA HAMILTON 1970 [id number]                     Emergency Contacts        (Rel.) Home Phone Work Phone Mobile Phone    Jj Hamilton (Spouse) -- -- [phone]                 Discharge Summary        Dedra Dumont APRN at [address] [place] [address] Medicine Services  DISCHARGE SUMMARY    Patient Name: Rehana Hamilton  : 1970  MRN: [id number]    Date of Admission: 3/29/2025  Date of Discharge:  2025  Primary Care Physician: Kimberly Hughes MD    Consults       Date and Time Order Name Status Description    3/29/2025  " 4:23 PM Inpatient Neurology Consult Stroke Completed             Hospital Course     Presenting Problem:   TIA (transient ischemic attack) [G45.9]    Active Hospital Problems   No active problems to display.      Resolved Hospital Problems    Diagnosis Date Resolved POA    **TIA (transient ischemic attack) [G45.9] 04/04/2025 Yes      Hospital Course:  Rehana Hamilton is a 54 y.o. female PMH prior CVA (bilateral, 2024) hx of PE ( on eliquis), HTN, T2DM, CHF, COPD, RA, SORAYA, hx of colorectal cancer s/p colectomy (2022) and hypothyroidism who presented with headache, dysarthria and worsening left-sided weakness. Pt's spouse evidently noticed the patient becoming weaker througout the day with slurred speech that began at 1430 on 03/29. Neurology consulted. NIH on arrival was 10. CT head neg, CTA H/N neg for flow-limiting stenosis, no LVO or aneurysm. CTP negative. Diagnosis concerning for complex headache versus MRI negative stroke. Pt reports missing doses of eliquis at times. Pt received a migraine cocktail with improvement of headache. Pt will follow up in the headache clinic out patient.     Assessment/Plan:  Left sided weakness   Dysarthria  Hx of PE-on eliquis  Chronic bilateral strokes  - stroke neurology consulted  - MRI brain neg for acute findings.   - CTA H/N negative for flow-limiting stenosis, no LVO or aneurysm.   - CTP negative   - TTE with bubble negative  - non-compliant with eliquis for a few weeks prior to arrival  - PT/OT recommend rehab    Domestic dog bite, cellulitis of posterior R lower leg  - reports dog bite by her own dog a few days PTA. She believes her dogs are up to date on rabies vaccine. Practice partner discussed risk/benefit of rabies vaccine/immunoglobulin. Pt expressed understanding of the seriousness, possible life threatening disease of rabies; however, declines intervention.   - unasyn changed to augmenting  - received tetanus shot    GERD  - PPI, carafate    Essential HTN  -  allowed for autoregulation of BP for adequate cerebral blood flow with nicardipine as needed for SBP>220.   - coreg held and not restarted due to bradycardia  - continued entresto on 03/31, resumed jardiance on 04/01 and spironolactone on 04/02.    HLD  - lipid panel reviewed  - atorvastatin 80 mg HS    Hypothyroidism  - continue home meds    T2DM  - A1C 6.0  - SSI  - jardiance resumed    Mood disorder  - prozac    Social issues  - water and electric has been turned off       Discharge Follow Up Recommendations for labs/diagnostics:  Follow up with PCP after discharge from rehab  Follow up with Dr Taylor for headaches (office will call with appointment)    Day of Discharge     HPI:   Pt sitting up in bed, denies headache, dizziness, nausea.     Otherwise ROS is negative except as mentioned in the HPI.    Vital Signs:   Temp:  [98.3 °F (36.8 °C)-98.5 °F (36.9 °C)] 98.3 °F (36.8 °C)  Heart Rate:  [57-81] 77  Resp:  [16] 16  BP: (144-167)/(73-92) 144/73     Physical Exam:  Constitutional: Awake, alert, NAD  HENT: NCAT, mucous membranes moist  Respiratory: Clear to auscultation bilaterally, nonlabored  Cardiovascular: RRR, no murmurs, rubs, or gallops  Gastrointestinal: Positive bowel sounds, soft, nontender, nondistended, obese  Musculoskeletal: No bilateral ankle edema  Psychiatric: Appropriate affect, cooperative  Neurologic: Oriented x 3, (+) dysarthria, chronic left sided weakness.   Skin: scabbing to the right calf and right buttocks without erythema    Pertinent  and/or Most Recent Results     Results from last 7 days   Lab Units 03/30/25  1419 03/29/25  1639 03/29/25  1634   WBC 10*3/mm3 10.56  --  7.75   HEMOGLOBIN g/dL 14.7  --  15.0   HEMOGLOBIN, POC g/dL  --  15.0  --    HEMATOCRIT % 45.0  --  45.9   HEMATOCRIT POC %  --  44  --    PLATELETS 10*3/mm3 238  --  252   SODIUM mmol/L 137  --   --    POTASSIUM mmol/L 4.1  --   --    CHLORIDE mmol/L 103  --   --    CO2 mmol/L 22.0  --   --    BUN mg/dL 11  --   --     CREATININE mg/dL 0.86 1.00  --    GLUCOSE mg/dL 129*  --   --    CALCIUM mg/dL 9.1  --   --      Results from last 7 days   Lab Units 03/29/25  1634   ALT (SGPT) U/L 9   AST (SGOT) U/L 15   PROTIME Seconds 13.4   INR  1.01   APTT seconds 30.9     Results from last 7 days   Lab Units 03/30/25  1419   CHOLESTEROL mg/dL 126   TRIGLYCERIDES mg/dL 59   HDL CHOL mg/dL 41     Results from last 7 days   Lab Units 03/30/25  1419 03/29/25  1634   HEMOGLOBIN A1C % 6.00*  --    LACTATE mmol/L  --  1.5       Brief Urine Lab Results  (Last result in the past 365 days)        Color   Clarity   Blood   Leuk Est   Nitrite   Protein   CREAT   Urine HCG        03/29/25 1747 Yellow   Clear   Negative   Negative   Negative   Negative                   Microbiology Results Abnormal       None            Imaging Results (All)       Procedure Component Value Units Date/Time    MRI Brain Without Contrast [[id number]] Collected: 03/30/25 0354     Updated: 03/30/25 0405    Narrative:      MRI BRAIN WO CONTRAST    Date of Exam: 3/29/2025 11:54 PM EDT    Indication: Stroke, follow up  Left sided weakness and dysartrhia, history of bilateral stroke.     Comparison: 3/29/2025    Technique:  Routine multiplanar/multisequence sequence images of the brain were obtained without contrast administration.      Findings:  There are a few scattered foci of increased T2 and FLAIR signal scattered in the subcortical white matter bilaterally consistent with chronic microvascular ischemia. There is no mass, mass effect or midline shift. There are no abnormal extra-axial fluid   collections or areas of acute hemorrhage. The major intracranial flow voids are preserved. The diffusion weighted sequences are normal. The paranasal sinuses and mastoid air cells are clear.      Impression:      Impression:  Mild chronic microvascular ischemia. No acute intracranial process.            Electronically Signed: Saeid Del Rio MD    3/30/2025 4:02 AM EDT    Workstation ID:  [id number]    XR Chest 1 View [[id number]] Collected: 03/29/25 1719     Updated: 03/29/25 1722    Narrative:      XR CHEST 1 VW    Date of Exam: 3/29/2025 4:53 PM EDT    Indication: Acute Stroke Protocol (onset < 12 hrs)    Comparison: CT chest from September 2, 2015    Findings:  The lungs are clear. The heart and mediastinal contours appear normal. There is no pleural effusion. The pulmonary vasculature appears normal. The osseous structures appear intact.      Impression:      Impression:  No acute cardiopulmonary process.      Electronically Signed: Ha Edgar MD    3/29/2025 5:19 PM EDT    Workstation ID: [id number]    CT Angiogram Head w AI Analysis of LVO [[id number]] Collected: 03/29/25 1648     Updated: 03/29/25 1701    Narrative:      CT ANGIOGRAM HEAD W AI ANALYSIS OF LVO, CT ANGIOGRAM NECK    Date of Exam: 3/29/2025 4:28 PM EDT    Indication: Neuro Deficit, acute, Stroke suspected  Neuro deficit, acute stroke suspected.    Comparison: None available.    Technique: CTA of the head and neck was performed after the uneventful intravenous administration of 115 cc Isovue-370. Reconstructed coronal and sagittal images were also obtained. In addition, a 3-D volume rendered image was created for interpretation.   Automated exposure control and iterative reconstruction methods were used.      Findings:  CTA NECK:  *Aortic arch: No aneurysm. No significant stenosis or occlusion of the major arch vessels.  *Left carotid system: No aneurysm, significant stenosis or occlusion.  *Right carotid system: No aneurysm, significant stenosis or occlusion.  *Vertebrobasilar system: The vertebral arteries arise from their respective subclavian arteries. No aneurysm, significant stenosis or occlusion.    CTA HEAD:  *Anterior circulation: No aneurysm, significant stenosis or occlusion.  *Posterior circulation: No aneurysm, significant stenosis or occlusion.  *Anatomic variants: None of significance.  *Venous sinuses: Patent.       Impression:      1.No hemodynamically significant stenosis, large vessel cut off or aneurysms in the intracranial circulation  2.No hemodynamically significant stenosis, dissection or aneurysms in the extracranial circulation.           Electronically Signed: Michael Vázquez MD    3/29/2025 4:58 PM EDT    Workstation ID: [id number]    CT Angiogram Neck [[id number]] Collected: 03/29/25 1648     Updated: 03/29/25 1701    Narrative:      CT ANGIOGRAM HEAD W AI ANALYSIS OF LVO, CT ANGIOGRAM NECK    Date of Exam: 3/29/2025 4:28 PM EDT    Indication: Neuro Deficit, acute, Stroke suspected  Neuro deficit, acute stroke suspected.    Comparison: None available.    Technique: CTA of the head and neck was performed after the uneventful intravenous administration of 115 cc Isovue-370. Reconstructed coronal and sagittal images were also obtained. In addition, a 3-D volume rendered image was created for interpretation.   Automated exposure control and iterative reconstruction methods were used.      Findings:  CTA NECK:  *Aortic arch: No aneurysm. No significant stenosis or occlusion of the major arch vessels.  *Left carotid system: No aneurysm, significant stenosis or occlusion.  *Right carotid system: No aneurysm, significant stenosis or occlusion.  *Vertebrobasilar system: The vertebral arteries arise from their respective subclavian arteries. No aneurysm, significant stenosis or occlusion.    CTA HEAD:  *Anterior circulation: No aneurysm, significant stenosis or occlusion.  *Posterior circulation: No aneurysm, significant stenosis or occlusion.  *Anatomic variants: None of significance.  *Venous sinuses: Patent.      Impression:      1.No hemodynamically significant stenosis, large vessel cut off or aneurysms in the intracranial circulation  2.No hemodynamically significant stenosis, dissection or aneurysms in the extracranial circulation.           Electronically Signed: Michael Vázquez MD    3/29/2025 4:58 PM EDT    Workstation  ID: [id number]    CT CEREBRAL PERFUSION WITH & WITHOUT CONTRAST [[id number]] Collected: 03/29/25 1646     Updated: 03/29/25 1658    Narrative:      CT CEREBRAL PERFUSION W WO CONTRAST    Date of Exam: 3/29/2025 4:28 PM EDT    Indication: Neuro Deficit, acute, Stroke suspected  Neuro deficit, acute stroke suspected.     Comparison: None available.    Technique: Axial CT images of the brain were obtained prior to and after the administration of 115 cc Isovue-370. Core blood volume, core blood flow, mean transit time, and Tmax images were obtained utilizing the Rapid software protocol. A limited CT   angiogram of the head was also performed to measure the blood vessel density.    The radiation dose reduction device was turned on for each scan per the ALARA (As Low as Reasonably Achievable) protocol.      Findings:          Cerebral blood flow, blood volume, and mean transit time maps are symmetric without large perfusion defect.     CBF<30% volume: 0mL  Tmax>6sec volume: 0 mL  Mismatch volume: 0mL  Mismatch ratio: None             Impression:         1. No evidence of core infarct nor ischemic brain at risk.            Electronically Signed: Michael Vázquez MD    3/29/2025 4:48 PM EDT    Workstation ID: [id number]    CT Head Without Contrast Stroke Protocol [[id number]] Collected: 03/29/25 1633     Updated: 03/29/25 1645    Narrative:      CT HEAD WO CONTRAST STROKE PROTOCOL    Date of Exam: 3/29/2025 4:25 PM EDT    Indication: Neuro deficit, acute, stroke suspected  Neuro Deficit, acute, Stroke suspected.    Comparison: None available.    Technique: Axial CT images were obtained of the head without contrast administration.  Reconstructed coronal images were also obtained. Automated exposure control and iterative construction methods were used.    Scan Time: 4:24 p.m.  Results discussed with stroke team at 4:31 p.m.      Findings:  Gray-white differentiation is maintained and there is no evidence of intracranial hemorrhage,  mass or mass effect. The ventricles are normal in size and configuration. The orbits are normal. The paranasal sinuses appear clear. The calvarium is intact.      Impression:      Impression:  No acute intracranial abnormality.        Electronically Signed: Joshua Tirado MD    3/29/2025 4:38 PM EDT    Workstation ID: [id number]                    Results for orders placed during the hospital encounter of 03/29/25    Adult Transthoracic Echo Complete W/ Cont if Necessary Per Protocol (With Agitated Saline)    Interpretation Summary    Left ventricular systolic function is normal. Left ventricular ejection fraction appears to be 56 - 60%.    Mild to moderate aortic valve regurgitation is present.    Normal left atrial size and volume noted. Saline test results are negative.        Discharge Details        Discharge Medications        New Medications        Instructions Start Date   acetaminophen 500 MG tablet  Commonly known as: TYLENOL  Replaces: SM 8 Hour Pain Relief 650 MG 8 hr tablet   500 mg, Oral, Every 6 Hours PRN      amoxicillin-clavulanate 875-125 MG per tablet  Commonly known as: AUGMENTIN   1 tablet, Oral, Every 12 Hours Scheduled      sucralfate 1 g tablet  Commonly known as: CARAFATE   1 g, Oral, 4 Times Daily Before Meals & Nightly             Changes to Medications        Instructions Start Date   FLUoxetine 20 MG capsule  Commonly known as: PROzac  What changed:   how much to take  when to take this   60 mg, Oral, Daily   Start Date: April 5, 2025            Continue These Medications        Instructions Start Date   apixaban 5 MG tablet tablet  Commonly known as: ELIQUIS   5 mg, 2 Times Daily      atorvastatin 80 MG tablet  Commonly known as: LIPITOR   80 mg, Nightly      cetirizine 10 MG tablet  Commonly known as: zyrTEC   10 mg, Oral, Daily PRN      docusate sodium 100 MG capsule  Commonly known as: COLACE   100 mg, 2 Times Daily PRN      Entresto 24-26 MG tablet  Generic drug:  sacubitril-valsartan   1 tablet, 2 Times Daily      fluticasone 50 MCG/ACT nasal spray  Commonly known as: FLONASE   2 sprays, Nasal, Daily PRN      Jardiance 25 MG tablet tablet  Generic drug: empagliflozin   25 mg, Daily      levothyroxine 75 MCG tablet  Commonly known as: SYNTHROID, LEVOTHROID   75 mcg, Oral, Every Early Morning      pantoprazole 40 MG EC tablet  Commonly known as: PROTONIX   TAKE ONE (1) TABLET BY MOUTH DAILY.      spironolactone 25 MG tablet  Commonly known as: ALDACTONE   25 mg, Daily             Stop These Medications      carvedilol 6.25 MG tablet  Commonly known as: COREG     lisinopril 20 MG tablet  Commonly known as: PRINIVIL,ZESTRIL     metFORMIN 500 MG tablet  Commonly known as: GLUCOPHAGE     pravastatin 40 MG tablet  Commonly known as: PRAVACHOL     Prenatal 27-1 27-1 MG tablet tablet     SM 8 Hour Pain Relief 650 MG 8 hr tablet  Generic drug: acetaminophen  Replaced by: acetaminophen 500 MG tablet     tiZANidine 2 MG tablet  Commonly known as: ZANAFLEX              Allergies   Allergen Reactions    Ciprofloxacin Hives         Discharge Disposition:  Rehab Facility or Unit (DC - External)    Discharge Diet:  Diet Order   Procedures    Diet: Cardiac, Diabetic; Healthy Heart (2-3 Na+); Consistent Carbohydrate; Fluid Consistency: Thin (IDDSI 0)         Discharge Activity:   Activity Instructions       Activity as Tolerated      Up WIth Assist                CODE STATUS:    Code Status and Medical Interventions: CPR (Attempt to Resuscitate); Full Support   Ordered at: 03/29/25 2009     Code Status (Patient has no pulse and is not breathing):    CPR (Attempt to Resuscitate)     Medical Interventions (Patient has pulse or is breathing):    Full Support     Level Of Support Discussed With:    Patient         No future appointments.    Additional Instructions for the Follow-ups that You Need to Schedule       Ambulatory Referral to Neurology   As directed      Complex headache during  admission.    Order Comments: Complex headache during admission.         Discharge Follow-up with PCP   As directed       Currently Documented PCP:    Kimberly Hughes MD    PCP Phone Number:    [phone]     Follow Up Details: Follow up with PCP after discharge from rehab.        Discharge Follow-up with Specified Provider: Dr Taylor (neurology f/u for headaches)   As directed      To: Dr Taylor (neurology f/u for headaches)   Follow Up Details: Office will call with an appointment.                Time Spent on Discharge:  44 minutes    Electronically signed by JOSE Feliciano, 04/04/25, 10:10 AM EDT.       Electronically signed by Dedra Dumont APRN at 04/04/25 8774

## 2025-04-04 NOTE — DISCHARGE SUMMARY
[facility] Medicine Services  DISCHARGE SUMMARY    Patient Name: Rehana Hamilton  : 1970  MRN: [id number]    Date of Admission: 3/29/2025  Date of Discharge:  2025  Primary Care Physician: Kimberly Hughes MD    Consults       Date and Time Order Name Status Description    3/29/2025  4:23 PM Inpatient Neurology Consult Stroke Completed             Hospital Course     Presenting Problem:   TIA (transient ischemic attack) [G45.9]    Active Hospital Problems   No active problems to display.      Resolved Hospital Problems    Diagnosis Date Resolved POA    **TIA (transient ischemic attack) [G45.9] 2025 Yes      Hospital Course:  Rehana Hamilton is a 54 y.o. female PMH prior CVA (bilateral, ) hx of PE ( on eliquis), HTN, T2DM, CHF, COPD, RA, SORAYA, hx of colorectal cancer s/p colectomy () and hypothyroidism who presented with headache, dysarthria and worsening left-sided weakness. Pt's spouse evidently noticed the patient becoming weaker througout the day with slurred speech that began at 1430 on . Neurology consulted. [facility] on arrival was 10. CT head neg, CTA H/N neg for flow-limiting stenosis, no LVO or aneurysm. CTP negative. Diagnosis concerning for complex headache versus MRI negative stroke. Pt reports missing doses of eliquis at times. Pt received a migraine cocktail with improvement of headache. Pt will follow up in the headache clinic out patient.     Assessment/Plan:  Left sided weakness   Dysarthria  Hx of PE-on eliquis  Chronic bilateral strokes  - stroke neurology consulted  - MRI brain neg for acute findings.   - CTA H/N negative for flow-limiting stenosis, no LVO or aneurysm.   - CTP negative   - TTE with bubble negative  - non-compliant with eliquis for a few weeks prior to arrival  - PT/OT recommend rehab    Domestic dog bite, cellulitis of posterior R lower leg  - reports dog bite by her own dog a few days PTA. She believes her dogs  are up to date on rabies vaccine. Practice partner discussed risk/benefit of rabies vaccine/immunoglobulin. Pt expressed understanding of the seriousness, possible life threatening disease of rabies; however, declines intervention.   - unasyn changed to augmenting  - received tetanus shot    GERD  - PPI, carafate    Essential HTN  - allowed for autoregulation of BP for adequate cerebral blood flow with nicardipine as needed for SBP>220.   - coreg held and not restarted due to bradycardia  - continued entresto on 03/31, resumed jardiance on 04/01 and spironolactone on 04/02.    HLD  - lipid panel reviewed  - atorvastatin 80 mg HS    Hypothyroidism  - continue home meds    T2DM  - A1C 6.0  - SSI  - jardiance resumed    Mood disorder  - prozac    Social issues  - water and electric has been turned off       Discharge Follow Up Recommendations for labs/diagnostics:  Follow up with PCP after discharge from rehab  Follow up with Dr Taylor for headaches (office will call with appointment)    Day of Discharge     HPI:   Pt sitting up in bed, denies headache, dizziness, nausea.     Otherwise ROS is negative except as mentioned in the HPI.    Vital Signs:   Temp:  [98.3 °F (36.8 °C)-98.5 °F (36.9 °C)] 98.3 °F (36.8 °C)  Heart Rate:  [57-81] 77  Resp:  [16] 16  BP: (144-167)/(73-92) 144/73     Physical Exam:  Constitutional: Awake, alert, NAD  HENT: NCAT, mucous membranes moist  Respiratory: Clear to auscultation bilaterally, nonlabored  Cardiovascular: RRR, no murmurs, rubs, or gallops  Gastrointestinal: Positive bowel sounds, soft, nontender, nondistended, obese  Musculoskeletal: No bilateral ankle edema  Psychiatric: Appropriate affect, cooperative  Neurologic: Oriented x 3, (+) dysarthria, chronic left sided weakness.   Skin: scabbing to the right calf and right buttocks without erythema    Pertinent  and/or Most Recent Results     Results from last 7 days   Lab Units 03/30/25  1419 03/29/25  1639 03/29/25  1634   WBC  10*3/mm3 10.56  --  7.75   HEMOGLOBIN g/dL 14.7  --  15.0   HEMOGLOBIN, POC g/dL  --  15.0  --    HEMATOCRIT % 45.0  --  45.9   HEMATOCRIT POC %  --  44  --    PLATELETS 10*3/mm3 238  --  252   SODIUM mmol/L 137  --   --    POTASSIUM mmol/L 4.1  --   --    CHLORIDE mmol/L 103  --   --    CO2 mmol/L 22.0  --   --    BUN mg/dL 11  --   --    CREATININE mg/dL 0.86 1.00  --    GLUCOSE mg/dL 129*  --   --    CALCIUM mg/dL 9.1  --   --      Results from last 7 days   Lab Units 03/29/25  1634   ALT (SGPT) U/L 9   AST (SGOT) U/L 15   PROTIME Seconds 13.4   INR  1.01   APTT seconds 30.9     Results from last 7 days   Lab Units 03/30/25  1419   CHOLESTEROL mg/dL 126   TRIGLYCERIDES mg/dL 59   HDL CHOL mg/dL 41     Results from last 7 days   Lab Units 03/30/25  1419 03/29/25  1634   HEMOGLOBIN A1C % 6.00*  --    LACTATE mmol/L  --  1.5       Brief Urine Lab Results  (Last result in the past 365 days)        Color   Clarity   Blood   Leuk Est   Nitrite   Protein   CREAT   Urine HCG        03/29/25 1747 Yellow   Clear   Negative   Negative   Negative   Negative                   Microbiology Results Abnormal       None            Imaging Results (All)       Procedure Component Value Units Date/Time    MRI Brain Without Contrast [[id number]] Collected: 03/30/25 0354     Updated: 03/30/25 0405    Narrative:      MRI BRAIN WO CONTRAST    Date of Exam: 3/29/2025 11:54 PM EDT    Indication: Stroke, follow up  Left sided weakness and dysartrhia, history of bilateral stroke.     Comparison: 3/29/2025    Technique:  Routine multiplanar/multisequence sequence images of the brain were obtained without contrast administration.      Findings:  There are a few scattered foci of increased T2 and FLAIR signal scattered in the subcortical white matter bilaterally consistent with chronic microvascular ischemia. There is no mass, mass effect or midline shift. There are no abnormal extra-axial fluid   collections or areas of acute hemorrhage. The  major intracranial flow voids are preserved. The diffusion weighted sequences are normal. The paranasal sinuses and mastoid air cells are clear.      Impression:      Impression:  Mild chronic microvascular ischemia. No acute intracranial process.            Electronically Signed: Saeid Del Rio MD    3/30/2025 4:02 AM EDT    Workstation ID: [id number]    XR Chest 1 View [[id number]] Collected: 03/29/25 1719     Updated: 03/29/25 1722    Narrative:      XR CHEST 1 VW    Date of Exam: 3/29/2025 4:53 PM EDT    Indication: Acute Stroke Protocol (onset < 12 hrs)    Comparison: CT chest from September 2, 2015    Findings:  The lungs are clear. The heart and mediastinal contours appear normal. There is no pleural effusion. The pulmonary vasculature appears normal. The osseous structures appear intact.      Impression:      Impression:  No acute cardiopulmonary process.      Electronically Signed: Ha Edgar MD    3/29/2025 5:19 PM EDT    Workstation ID: [id number]    CT Angiogram Head w AI Analysis of LVO [[id number]] Collected: 03/29/25 1648     Updated: 03/29/25 1701    Narrative:      CT ANGIOGRAM HEAD W AI ANALYSIS OF LVO, CT ANGIOGRAM NECK    Date of Exam: 3/29/2025 4:28 PM EDT    Indication: Neuro Deficit, acute, Stroke suspected  Neuro deficit, acute stroke suspected.    Comparison: None available.    Technique: CTA of the head and neck was performed after the uneventful intravenous administration of 115 cc Isovue-370. Reconstructed coronal and sagittal images were also obtained. In addition, a 3-D volume rendered image was created for interpretation.   Automated exposure control and iterative reconstruction methods were used.      Findings:  CTA NECK:  *Aortic arch: No aneurysm. No significant stenosis or occlusion of the major arch vessels.  *Left carotid system: No aneurysm, significant stenosis or occlusion.  *Right carotid system: No aneurysm, significant stenosis or occlusion.  *Vertebrobasilar system: The  vertebral arteries arise from their respective subclavian arteries. No aneurysm, significant stenosis or occlusion.    CTA HEAD:  *Anterior circulation: No aneurysm, significant stenosis or occlusion.  *Posterior circulation: No aneurysm, significant stenosis or occlusion.  *Anatomic variants: None of significance.  *Venous sinuses: Patent.      Impression:      1.No hemodynamically significant stenosis, large vessel cut off or aneurysms in the intracranial circulation  2.No hemodynamically significant stenosis, dissection or aneurysms in the extracranial circulation.           Electronically Signed: Michael Vázquez MD    3/29/2025 4:58 PM EDT    Workstation ID: [id number]    CT Angiogram Neck [[id number]] Collected: 03/29/25 1648     Updated: 03/29/25 1701    Narrative:      CT ANGIOGRAM HEAD W AI ANALYSIS OF LVO, CT ANGIOGRAM NECK    Date of Exam: 3/29/2025 4:28 PM EDT    Indication: Neuro Deficit, acute, Stroke suspected  Neuro deficit, acute stroke suspected.    Comparison: None available.    Technique: CTA of the head and neck was performed after the uneventful intravenous administration of 115 cc Isovue-370. Reconstructed coronal and sagittal images were also obtained. In addition, a 3-D volume rendered image was created for interpretation.   Automated exposure control and iterative reconstruction methods were used.      Findings:  CTA NECK:  *Aortic arch: No aneurysm. No significant stenosis or occlusion of the major arch vessels.  *Left carotid system: No aneurysm, significant stenosis or occlusion.  *Right carotid system: No aneurysm, significant stenosis or occlusion.  *Vertebrobasilar system: The vertebral arteries arise from their respective subclavian arteries. No aneurysm, significant stenosis or occlusion.    CTA HEAD:  *Anterior circulation: No aneurysm, significant stenosis or occlusion.  *Posterior circulation: No aneurysm, significant stenosis or occlusion.  *Anatomic variants: None of  significance.  *Venous sinuses: Patent.      Impression:      1.No hemodynamically significant stenosis, large vessel cut off or aneurysms in the intracranial circulation  2.No hemodynamically significant stenosis, dissection or aneurysms in the extracranial circulation.           Electronically Signed: Michael Vázquez MD    3/29/2025 4:58 PM EDT    Workstation ID: [id number]    CT CEREBRAL PERFUSION WITH & WITHOUT CONTRAST [[id number]] Collected: 03/29/25 1646     Updated: 03/29/25 1658    Narrative:      CT CEREBRAL PERFUSION W WO CONTRAST    Date of Exam: 3/29/2025 4:28 PM EDT    Indication: Neuro Deficit, acute, Stroke suspected  Neuro deficit, acute stroke suspected.     Comparison: None available.    Technique: Axial CT images of the brain were obtained prior to and after the administration of 115 cc Isovue-370. Core blood volume, core blood flow, mean transit time, and Tmax images were obtained utilizing the Rapid software protocol. A limited CT   angiogram of the head was also performed to measure the blood vessel density.    The radiation dose reduction device was turned on for each scan per the ALARA (As Low as Reasonably Achievable) protocol.      Findings:          Cerebral blood flow, blood volume, and mean transit time maps are symmetric without large perfusion defect.     CBF<30% volume: 0mL  Tmax>6sec volume: 0 mL  Mismatch volume: 0mL  Mismatch ratio: None             Impression:         1. No evidence of core infarct nor ischemic brain at risk.            Electronically Signed: Michael Vázquez MD    3/29/2025 4:48 PM EDT    Workstation ID: [id number]    CT Head Without Contrast Stroke Protocol [[id number]] Collected: 03/29/25 1633     Updated: 03/29/25 1645    Narrative:      CT HEAD WO CONTRAST STROKE PROTOCOL    Date of Exam: 3/29/2025 4:25 PM EDT    Indication: Neuro deficit, acute, stroke suspected  Neuro Deficit, acute, Stroke suspected.    Comparison: None available.    Technique: Axial CT images were  obtained of the head without contrast administration.  Reconstructed coronal images were also obtained. Automated exposure control and iterative construction methods were used.    Scan Time: 4:24 p.m.  Results discussed with stroke team at 4:31 p.m.      Findings:  Gray-white differentiation is maintained and there is no evidence of intracranial hemorrhage, mass or mass effect. The ventricles are normal in size and configuration. The orbits are normal. The paranasal sinuses appear clear. The calvarium is intact.      Impression:      Impression:  No acute intracranial abnormality.        Electronically Signed: Joshua Tirado MD    3/29/2025 4:38 PM EDT    Workstation ID: [id number]                    Results for orders placed during the hospital encounter of 03/29/25    Adult Transthoracic Echo Complete W/ Cont if Necessary Per Protocol (With Agitated Saline)    Interpretation Summary    Left ventricular systolic function is normal. Left ventricular ejection fraction appears to be 56 - 60%.    Mild to moderate aortic valve regurgitation is present.    Normal left atrial size and volume noted. Saline test results are negative.        Discharge Details        Discharge Medications        New Medications        Instructions Start Date   acetaminophen 500 MG tablet  Commonly known as: TYLENOL  Replaces: SM 8 Hour Pain Relief 650 MG 8 hr tablet   500 mg, Oral, Every 6 Hours PRN      amoxicillin-clavulanate 875-125 MG per tablet  Commonly known as: AUGMENTIN   1 tablet, Oral, Every 12 Hours Scheduled      sucralfate 1 g tablet  Commonly known as: CARAFATE   1 g, Oral, 4 Times Daily Before Meals & Nightly             Changes to Medications        Instructions Start Date   FLUoxetine 20 MG capsule  Commonly known as: PROzac  What changed:   how much to take  when to take this   60 mg, Oral, Daily   Start Date: April 5, 2025            Continue These Medications        Instructions Start Date   apixaban 5 MG tablet  tablet  Commonly known as: ELIQUIS   5 mg, 2 Times Daily      atorvastatin 80 MG tablet  Commonly known as: LIPITOR   80 mg, Nightly      cetirizine 10 MG tablet  Commonly known as: zyrTEC   10 mg, Oral, Daily PRN      docusate sodium 100 MG capsule  Commonly known as: COLACE   100 mg, 2 Times Daily PRN      Entresto 24-26 MG tablet  Generic drug: sacubitril-valsartan   1 tablet, 2 Times Daily      fluticasone 50 MCG/ACT nasal spray  Commonly known as: FLONASE   2 sprays, Nasal, Daily PRN      Jardiance 25 MG tablet tablet  Generic drug: empagliflozin   25 mg, Daily      levothyroxine 75 MCG tablet  Commonly known as: SYNTHROID, LEVOTHROID   75 mcg, Oral, Every Early Morning      pantoprazole 40 MG EC tablet  Commonly known as: PROTONIX   TAKE ONE (1) TABLET BY MOUTH DAILY.      spironolactone 25 MG tablet  Commonly known as: ALDACTONE   25 mg, Daily             Stop These Medications      carvedilol 6.25 MG tablet  Commonly known as: COREG     lisinopril 20 MG tablet  Commonly known as: PRINIVIL,ZESTRIL     metFORMIN 500 MG tablet  Commonly known as: GLUCOPHAGE     pravastatin 40 MG tablet  Commonly known as: PRAVACHOL     Prenatal 27-1 27-1 MG tablet tablet     SM 8 Hour Pain Relief 650 MG 8 hr tablet  Generic drug: acetaminophen  Replaced by: acetaminophen 500 MG tablet     tiZANidine 2 MG tablet  Commonly known as: ZANAFLEX              Allergies   Allergen Reactions    Ciprofloxacin Hives         Discharge Disposition:  Rehab Facility or Unit (DC - External)    Discharge Diet:  Diet Order   Procedures    Diet: Cardiac, Diabetic; Healthy Heart (2-3 Na+); Consistent Carbohydrate; Fluid Consistency: Thin (IDDSI 0)         Discharge Activity:   Activity Instructions       Activity as Tolerated      Up WIth Assist                CODE STATUS:    Code Status and Medical Interventions: CPR (Attempt to Resuscitate); Full Support   Ordered at: 03/29/25 2009     Code Status (Patient has no pulse and is not breathing):     CPR (Attempt to Resuscitate)     Medical Interventions (Patient has pulse or is breathing):    Full Support     Level Of Support Discussed With:    Patient         No future appointments.    Additional Instructions for the Follow-ups that You Need to Schedule       Ambulatory Referral to Neurology   As directed      Complex headache during admission.    Order Comments: Complex headache during admission.         Discharge Follow-up with PCP   As directed       Currently Documented PCP:    Kimberly Hughes MD    PCP Phone Number:    [phone]     Follow Up Details: Follow up with PCP after discharge from rehab.        Discharge Follow-up with Specified Provider: Dr Taylor (neurology f/u for headaches)   As directed      To: Dr Taylor (neurology f/u for headaches)   Follow Up Details: Office will call with an appointment.                Time Spent on Discharge:  44 minutes    Electronically signed by JOSE Feliciano, 04/04/25, 10:10 AM EDT.

## 2025-04-04 NOTE — CASE MANAGEMENT/SOCIAL WORK
Case Management Discharge Note      Final Note: Pt to discharge to [facility]  today 4/4/2025. Nurse to call report to [phone]. Discharge summary to be faxed to [phone]. [facility] 14[facility]         Selected Continued Care - Admitted Since 3/29/2025       Destination Coordination complete.      Service Provider Services Address Phone Fax Patient Preferred    [facility] [address] [address] [phone] --              Durable Medical Equipment    No services have been selected for the patient.                Dialysis/Infusion    No services have been selected for the patient.                Home Medical Care    No services have been selected for the patient.                Therapy    No services have been selected for the patient.                Community Resources    No services have been selected for the patient.                Community & DME    No services have been selected for the patient.                    Transportation Services  W/C Van: Other ([facility])    Final Discharge Disposition Code: 62 - inpatient rehab facility

## 2025-04-04 NOTE — NURSING NOTE
Given patient's report to DUNIA Cook from [facility]. Patient will be transported by [facility] van at 1430 this afternoon. Patient will call out when ready.

## 2025-04-08 LAB
QT INTERVAL: 432 MS
QTC INTERVAL: 442 MS

## 2025-05-15 ENCOUNTER — APPOINTMENT (OUTPATIENT)
Dept: CT IMAGING | Facility: HOSPITAL | Age: 55
End: 2025-05-15
Payer: COMMERCIAL

## 2025-05-15 ENCOUNTER — HOSPITAL ENCOUNTER (EMERGENCY)
Facility: HOSPITAL | Age: 55
Discharge: HOME | End: 2025-05-15
Attending: EMERGENCY MEDICINE
Payer: COMMERCIAL

## 2025-05-15 VITALS
SYSTOLIC BLOOD PRESSURE: 150 MMHG | HEIGHT: 64 IN | OXYGEN SATURATION: 97 % | BODY MASS INDEX: 31.92 KG/M2 | DIASTOLIC BLOOD PRESSURE: 81 MMHG | TEMPERATURE: 98.3 F | HEART RATE: 58 BPM | RESPIRATION RATE: 18 BRPM | WEIGHT: 187 LBS

## 2025-05-15 DIAGNOSIS — R51.9 ACUTE NONINTRACTABLE HEADACHE, UNSPECIFIED HEADACHE TYPE: Primary | ICD-10-CM

## 2025-05-15 DIAGNOSIS — I10 HYPERTENSION, UNSPECIFIED TYPE: ICD-10-CM

## 2025-05-15 LAB
ALBUMIN SERPL-MCNC: 4.2 G/DL (ref 3.5–5.2)
ALBUMIN/GLOB SERPL: 1.7 G/DL
ALP SERPL-CCNC: 64 U/L (ref 39–117)
ALT SERPL W P-5'-P-CCNC: 13 U/L (ref 1–33)
ANION GAP SERPL CALCULATED.3IONS-SCNC: 10 MMOL/L (ref 5–15)
AST SERPL-CCNC: 13 U/L (ref 1–32)
BASOPHILS # BLD AUTO: 0.04 10*3/MM3 (ref 0–0.2)
BASOPHILS NFR BLD AUTO: 0.5 % (ref 0–1.5)
BILIRUB SERPL-MCNC: 0.5 MG/DL (ref 0–1.2)
BUN SERPL-MCNC: 9 MG/DL (ref 6–20)
BUN/CREAT SERPL: 10.3 (ref 7–25)
CALCIUM SPEC-SCNC: 9.4 MG/DL (ref 8.6–10.5)
CHLORIDE SERPL-SCNC: 104 MMOL/L (ref 98–107)
CO2 SERPL-SCNC: 27 MMOL/L (ref 22–29)
CREAT SERPL-MCNC: 0.87 MG/DL (ref 0.57–1)
D DIMER PPP FEU-MCNC: 0.27 MCGFEU/ML (ref 0–0.54)
DEPRECATED RDW RBC AUTO: 41.6 FL (ref 37–54)
EGFRCR SERPLBLD CKD-EPI 2021: 79.3 ML/MIN/1.73
EOSINOPHIL # BLD AUTO: 0.1 10*3/MM3 (ref 0–0.4)
EOSINOPHIL NFR BLD AUTO: 1.2 % (ref 0.3–6.2)
ERYTHROCYTE [DISTWIDTH] IN BLOOD BY AUTOMATED COUNT: 11.9 % (ref 12.3–15.4)
GLOBULIN UR ELPH-MCNC: 2.5 GM/DL
GLUCOSE SERPL-MCNC: 101 MG/DL (ref 65–99)
HCT VFR BLD AUTO: 44.8 % (ref 34–46.6)
HGB BLD-MCNC: 15.2 G/DL (ref 12–15.9)
IMM GRANULOCYTES # BLD AUTO: 0.02 10*3/MM3 (ref 0–0.05)
IMM GRANULOCYTES NFR BLD AUTO: 0.2 % (ref 0–0.5)
LYMPHOCYTES # BLD AUTO: 1.87 10*3/MM3 (ref 0.7–3.1)
LYMPHOCYTES NFR BLD AUTO: 22.4 % (ref 19.6–45.3)
MAGNESIUM SERPL-MCNC: 1.7 MG/DL (ref 1.6–2.6)
MCH RBC QN AUTO: 32.3 PG (ref 26.6–33)
MCHC RBC AUTO-ENTMCNC: 33.9 G/DL (ref 31.5–35.7)
MCV RBC AUTO: 95.3 FL (ref 79–97)
MONOCYTES # BLD AUTO: 0.7 10*3/MM3 (ref 0.1–0.9)
MONOCYTES NFR BLD AUTO: 8.4 % (ref 5–12)
NEUTROPHILS NFR BLD AUTO: 5.62 10*3/MM3 (ref 1.7–7)
NEUTROPHILS NFR BLD AUTO: 67.3 % (ref 42.7–76)
NRBC BLD AUTO-RTO: 0 /100 WBC (ref 0–0.2)
PLATELET # BLD AUTO: 233 10*3/MM3 (ref 140–450)
PMV BLD AUTO: 9.8 FL (ref 6–12)
POTASSIUM SERPL-SCNC: 3.4 MMOL/L (ref 3.5–5.2)
PROT SERPL-MCNC: 6.7 G/DL (ref 6–8.5)
RBC # BLD AUTO: 4.7 10*6/MM3 (ref 3.77–5.28)
SODIUM SERPL-SCNC: 141 MMOL/L (ref 136–145)
WBC NRBC COR # BLD AUTO: 8.35 10*3/MM3 (ref 3.4–10.8)

## 2025-05-15 PROCEDURE — 99284 EMERGENCY DEPT VISIT MOD MDM: CPT | Performed by: EMERGENCY MEDICINE

## 2025-05-15 PROCEDURE — 80053 COMPREHEN METABOLIC PANEL: CPT | Performed by: EMERGENCY MEDICINE

## 2025-05-15 PROCEDURE — 96374 THER/PROPH/DIAG INJ IV PUSH: CPT

## 2025-05-15 PROCEDURE — 96375 TX/PRO/DX INJ NEW DRUG ADDON: CPT

## 2025-05-15 PROCEDURE — 85379 FIBRIN DEGRADATION QUANT: CPT | Performed by: EMERGENCY MEDICINE

## 2025-05-15 PROCEDURE — 25010000002 METOCLOPRAMIDE PER 10 MG: Performed by: EMERGENCY MEDICINE

## 2025-05-15 PROCEDURE — 85025 COMPLETE CBC W/AUTO DIFF WBC: CPT | Performed by: EMERGENCY MEDICINE

## 2025-05-15 PROCEDURE — 25010000002 MAGNESIUM SULFATE 2 GM/50ML SOLUTION: Performed by: EMERGENCY MEDICINE

## 2025-05-15 PROCEDURE — 25010000002 DEXAMETHASONE SODIUM PHOSPHATE 10 MG/ML SOLUTION: Performed by: EMERGENCY MEDICINE

## 2025-05-15 PROCEDURE — 70450 CT HEAD/BRAIN W/O DYE: CPT

## 2025-05-15 PROCEDURE — 83735 ASSAY OF MAGNESIUM: CPT | Performed by: EMERGENCY MEDICINE

## 2025-05-15 PROCEDURE — 25010000002 DIPHENHYDRAMINE PER 50 MG: Performed by: EMERGENCY MEDICINE

## 2025-05-15 RX ORDER — BUTALBITAL, ACETAMINOPHEN AND CAFFEINE 50; 325; 40 MG/1; MG/1; MG/1
1 TABLET ORAL EVERY 6 HOURS PRN
Qty: 15 TABLET | Refills: 0 | Status: SHIPPED | OUTPATIENT
Start: 2025-05-15

## 2025-05-15 RX ORDER — CLONIDINE HYDROCHLORIDE 0.2 MG/1
0.2 TABLET ORAL ONCE
Status: COMPLETED | OUTPATIENT
Start: 2025-05-15 | End: 2025-05-15

## 2025-05-15 RX ORDER — DIPHENHYDRAMINE HYDROCHLORIDE 50 MG/ML
12.5 INJECTION, SOLUTION INTRAMUSCULAR; INTRAVENOUS ONCE
Status: COMPLETED | OUTPATIENT
Start: 2025-05-15 | End: 2025-05-15

## 2025-05-15 RX ORDER — DEXAMETHASONE SODIUM PHOSPHATE 10 MG/ML
10 INJECTION, SOLUTION INTRAMUSCULAR; INTRAVENOUS ONCE
Status: COMPLETED | OUTPATIENT
Start: 2025-05-15 | End: 2025-05-15

## 2025-05-15 RX ORDER — BUSPIRONE HYDROCHLORIDE 7.5 MG/1
TABLET ORAL
COMMUNITY
Start: 2025-04-14

## 2025-05-15 RX ORDER — LEVOTHYROXINE SODIUM 75 UG/1
75 TABLET ORAL
Qty: 30 TABLET | Refills: 0 | Status: SHIPPED | OUTPATIENT
Start: 2025-05-15

## 2025-05-15 RX ORDER — METOCLOPRAMIDE HYDROCHLORIDE 5 MG/ML
10 INJECTION INTRAMUSCULAR; INTRAVENOUS ONCE
Status: COMPLETED | OUTPATIENT
Start: 2025-05-15 | End: 2025-05-15

## 2025-05-15 RX ORDER — SPIRONOLACTONE 25 MG/1
25 TABLET ORAL DAILY
Qty: 30 TABLET | Refills: 0 | Status: SHIPPED | OUTPATIENT
Start: 2025-05-15

## 2025-05-15 RX ORDER — SACUBITRIL AND VALSARTAN 24; 26 MG/1; MG/1
1 TABLET, FILM COATED ORAL 2 TIMES DAILY
Qty: 60 TABLET | Refills: 0 | Status: SHIPPED | OUTPATIENT
Start: 2025-05-15

## 2025-05-15 RX ORDER — SODIUM CHLORIDE 0.9 % (FLUSH) 0.9 %
10 SYRINGE (ML) INJECTION AS NEEDED
Status: DISCONTINUED | OUTPATIENT
Start: 2025-05-15 | End: 2025-05-15 | Stop reason: HOSPADM

## 2025-05-15 RX ORDER — PANTOPRAZOLE SODIUM 40 MG/1
40 TABLET, DELAYED RELEASE ORAL DAILY
Qty: 30 TABLET | Refills: 0 | Status: SHIPPED | OUTPATIENT
Start: 2025-05-15

## 2025-05-15 RX ORDER — ATORVASTATIN CALCIUM 80 MG/1
80 TABLET, FILM COATED ORAL NIGHTLY
Qty: 90 TABLET | Refills: 0 | Status: SHIPPED | OUTPATIENT
Start: 2025-05-15

## 2025-05-15 RX ORDER — MAGNESIUM SULFATE HEPTAHYDRATE 40 MG/ML
2 INJECTION, SOLUTION INTRAVENOUS ONCE
Status: COMPLETED | OUTPATIENT
Start: 2025-05-15 | End: 2025-05-15

## 2025-05-15 RX ADMIN — METOCLOPRAMIDE 10 MG: 5 INJECTION, SOLUTION INTRAMUSCULAR; INTRAVENOUS at 18:31

## 2025-05-15 RX ADMIN — DEXAMETHASONE SODIUM PHOSPHATE 10 MG: 10 INJECTION INTRAMUSCULAR; INTRAVENOUS at 18:35

## 2025-05-15 RX ADMIN — MAGNESIUM SULFATE HEPTAHYDRATE 2 G: 40 INJECTION, SOLUTION INTRAVENOUS at 18:23

## 2025-05-15 RX ADMIN — CLONIDINE HYDROCHLORIDE 0.2 MG: 0.2 TABLET ORAL at 18:24

## 2025-05-15 RX ADMIN — DIPHENHYDRAMINE HYDROCHLORIDE 12.5 MG: 50 INJECTION, SOLUTION INTRAMUSCULAR; INTRAVENOUS at 18:31

## 2025-05-15 NOTE — ED NOTES
Pt left ER in stable condition and a&o x4. Pt left in a wheelchair accompanied by family and left via personal vehicle. Pt and family had no further questions following discharge.

## 2025-05-16 NOTE — ED PROVIDER NOTES
" EMERGENCY DEPARTMENT ENCOUNTER    Pt Name: Rehana Hamilton  MRN: [id number]  Pt :   1970  Room Number:    Date of encounter:  5/15/2025  PCP: Kimberly Hughes MD  ED Provider: Abdoul Gomez MD    Historian: Patient and Family      HPI:  Chief Complaint   Patient presents with    Hypertension    Headache          Context: Rehana Hamilton is a 54 y.o. female who presents to the ED c/o headache, high blood pressure.  Patient was following up for her recent hospitalization with her primary care doctor today, her blood pressure was 220 systolic and she was redirected to the ER.  On arrival she reports headache, very similar to headache she has been having, denies chest pain, shortness of breath or any other complaints she notes that her recent admission did not show new stroke on her MRI, she does have a history of old strokes however.      PAST MEDICAL HISTORY  Past Medical History:   Diagnosis Date    Allergic rhinitis     Cancer of transverse colon     Personal history of colon cancer    Colon cancer 10/11/2016    Congestive heart failure     COPD (chronic obstructive pulmonary disease)     Depression     Disease of thyroid gland     Esophagitis     GERD (gastroesophageal reflux disease)     History of degenerative disc disease     History of migraine     History of nuclear stress test 2017    \"IT WAS NORMAL\"    History of sleep apnea     NO CPAP    Hypertension     Personal history of congestive heart failure     PONV (postoperative nausea and vomiting)     Rheumatoid arthritis     Tattoo     X5    Type 2 diabetes mellitus          PAST SURGICAL HISTORY  Past Surgical History:   Procedure Laterality Date    CHOLECYSTECTOMY      COLON RESECTION  2014    SECONDARY TO COLON CANCER    COLONOSCOPY N/A 10/2/2017    Procedure: DIAGNOSTIC COLONOSCOPY;  Surgeon: Elina Summers MD;  Location: [facility] ENDOSCOPY;  Service:     COLONOSCOPY N/A 2018    Procedure: " COLONOSCOPY;  Surgeon: Elina Summers MD;  Location: [facility] ENDOSCOPY;  Service: Gastroenterology    COLONOSCOPY N/A 9/11/2020    Procedure: COLONOSCOPY W/ COLD SNARE POLYPECTOMY;  Surgeon: Elina Summers MD;  Location: [facility] ENDOSCOPY;  Service: Gastroenterology;  Laterality: N/A;    COLONOSCOPY W/ BIOPSIES      COLONOSCOPY W/ POLYPECTOMY      DILATION AND CURETTAGE, DIAGNOSTIC / THERAPEUTIC      ENDOSCOPY N/A 10/2/2017    Procedure: ESOPHAGOGASTRODUODENOSCOPY WITH BIOPSY;  Surgeon: Elina Summers MD;  Location: [facility] ENDOSCOPY;  Service:     HYSTERECTOMY      Total Abdominal Hysterectomy with Removal of Both Ovaries    TUBAL ABDOMINAL LIGATION      UPPER GASTROINTESTINAL ENDOSCOPY      Diagnostic         FAMILY HISTORY  Family History   Problem Relation Age of Onset    Prostate cancer Father     Osteoporosis Mother     Diabetes Sister          SOCIAL HISTORY  Social History     Socioeconomic History    Marital status:    Tobacco Use    Smoking status: Never    Smokeless tobacco: Never   Vaping Use    Vaping status: Never Used   Substance and Sexual Activity    Alcohol use: No    Drug use: No    Sexual activity: Defer         ALLERGIES  Ciprofloxacin        REVIEW OF SYSTEMS  Review of Systems   Constitutional:  Negative for chills and fever.   HENT:  Negative for sore throat and trouble swallowing.    Eyes:  Negative for pain and redness.   Respiratory:  Negative for cough and shortness of breath.    Cardiovascular:  Negative for chest pain and leg swelling.   Gastrointestinal:  Negative for abdominal pain, nausea and vomiting.   Genitourinary:  Negative for dysuria and urgency.   Musculoskeletal:  Negative for back pain and neck pain.   Skin:  Negative for rash and wound.   Neurological:  Positive for headaches. Negative for dizziness and weakness.        All systems reviewed and negative except for those discussed in HPI.       PHYSICAL EXAM    I have reviewed the triage vital signs  and nursing notes.    ED Triage Vitals [05/15/25 1736]   Temp Heart Rate Resp BP SpO2   98.3 °F (36.8 °C) 70 18 (!) 197/104 96 %      Temp src Heart Rate Source Patient Position BP Location FiO2 (%)   Oral Monitor Sitting Left arm --       Physical Exam  Constitutional:       Appearance: Normal appearance. She is not ill-appearing.   HENT:      Head: Normocephalic and atraumatic.      Right Ear: External ear normal.      Left Ear: External ear normal.      Nose: Nose normal.      Mouth/Throat:      Mouth: Mucous membranes are moist.      Pharynx: Oropharynx is clear.   Eyes:      Extraocular Movements: Extraocular movements intact.      Conjunctiva/sclera: Conjunctivae normal.      Pupils: Pupils are equal, round, and reactive to light.   Cardiovascular:      Rate and Rhythm: Normal rate and regular rhythm.      Pulses:           Radial pulses are 2+ on the right side and 2+ on the left side.   Pulmonary:      Effort: Pulmonary effort is normal.      Breath sounds: Normal breath sounds.   Abdominal:      General: There is no distension.      Palpations: Abdomen is soft.      Tenderness: There is no abdominal tenderness.   Musculoskeletal:         General: No tenderness or deformity. Normal range of motion.      Cervical back: Normal range of motion and neck supple.      Right lower leg: No edema.      Left lower leg: No edema.   Skin:     General: Skin is warm and dry.      Capillary Refill: Capillary refill takes less than 2 seconds.   Neurological:      General: No focal deficit present.      Mental Status: She is alert and oriented to person, place, and time.      GCS: GCS eye subscore is 4. GCS verbal subscore is 5. GCS motor subscore is 6.            LAB RESULTS  Recent Results (from the past 24 hours)   Comprehensive Metabolic Panel    Collection Time: 05/15/25  6:07 PM    Specimen: Blood   Result Value Ref Range    Glucose 101 (H) 65 - 99 mg/dL    BUN 9 6 - 20 mg/dL    Creatinine 0.87 0.57 - 1.00 mg/dL     Sodium 141 136 - 145 mmol/L    Potassium 3.4 (L) 3.5 - 5.2 mmol/L    Chloride 104 98 - 107 mmol/L    CO2 27.0 22.0 - 29.0 mmol/L    Calcium 9.4 8.6 - 10.5 mg/dL    Total Protein 6.7 6.0 - 8.5 g/dL    Albumin 4.2 3.5 - 5.2 g/dL    ALT (SGPT) 13 1 - 33 U/L    AST (SGOT) 13 1 - 32 U/L    Alkaline Phosphatase 64 39 - 117 U/L    Total Bilirubin 0.5 0.0 - 1.2 mg/dL    Globulin 2.5 gm/dL    A/G Ratio 1.7 g/dL    BUN/Creatinine Ratio 10.3 7.0 - 25.0    Anion Gap 10.0 5.0 - 15.0 mmol/L    eGFR 79.3 >60.0 mL/min/1.73   D-dimer, Quantitative    Collection Time: 05/15/25  6:07 PM    Specimen: Blood   Result Value Ref Range    D-Dimer, Quantitative 0.27 0.00 - 0.54 MCGFEU/mL   Magnesium    Collection Time: 05/15/25  6:07 PM    Specimen: Blood   Result Value Ref Range    Magnesium 1.7 1.6 - 2.6 mg/dL   CBC Auto Differential    Collection Time: 05/15/25  6:07 PM    Specimen: Blood   Result Value Ref Range    WBC 8.35 3.40 - 10.80 10*3/mm3    RBC 4.70 3.77 - 5.28 10*6/mm3    Hemoglobin 15.2 12.0 - 15.9 g/dL    Hematocrit 44.8 34.0 - 46.6 %    MCV 95.3 79.0 - 97.0 fL    MCH 32.3 26.6 - 33.0 pg    MCHC 33.9 31.5 - 35.7 g/dL    RDW 11.9 (L) 12.3 - 15.4 %    RDW-SD 41.6 37.0 - 54.0 fl    MPV 9.8 6.0 - 12.0 fL    Platelets 233 140 - 450 10*3/mm3    Neutrophil % 67.3 42.7 - 76.0 %    Lymphocyte % 22.4 19.6 - 45.3 %    Monocyte % 8.4 5.0 - 12.0 %    Eosinophil % 1.2 0.3 - 6.2 %    Basophil % 0.5 0.0 - 1.5 %    Immature Grans % 0.2 0.0 - 0.5 %    Neutrophils, Absolute 5.62 1.70 - 7.00 10*3/mm3    Lymphocytes, Absolute 1.87 0.70 - 3.10 10*3/mm3    Monocytes, Absolute 0.70 0.10 - 0.90 10*3/mm3    Eosinophils, Absolute 0.10 0.00 - 0.40 10*3/mm3    Basophils, Absolute 0.04 0.00 - 0.20 10*3/mm3    Immature Grans, Absolute 0.02 0.00 - 0.05 10*3/mm3    nRBC 0.0 0.0 - 0.2 /100 WBC       If labs were ordered, I independently reviewed the results and considered them in treating the patient.        RADIOLOGY  CT Head Without Contrast  Result Date:  5/15/2025  FINAL REPORT TECHNIQUE: null CLINICAL HISTORY: headache, hypertension COMPARISON: null FINDINGS: Noncontrast CT of the head Technique: Noncontrast CT of the head from the vertex through the skull base. Comparison: None Findings: Normal CT of the head. No intracranial mass, hemorrhage or hydrocephalus. No evidence for acute ischemia by CT. No skull fractures. Normal aeration of visualized paranasal sinuses.     Impression: Normal CT of the head. Authenticated and Electronically Signed by Kyle Bustillos MD on 05/15/2025 06:49:35 PM          PROCEDURES    Procedures    Interpretations    O2 Sat: The patient's oxygen saturation was 97% on Room Air.  This was independently interpreted by me as Normal      Radiology: I ordered and independently reviewed the above noted radiographic studies.  I viewed images of CT Head which showed No intracranial hemorrhage per my independent interpretation. See radiologist's dictation for official interpretation.         MEDICATIONS GIVEN IN ER    Medications   metoclopramide (REGLAN) injection 10 mg (10 mg Intravenous Given 5/15/25 1831)   diphenhydrAMINE (BENADRYL) injection 12.5 mg (12.5 mg Intravenous Given 5/15/25 1831)   magnesium sulfate 2g/50 mL (PREMIX) infusion (0 g Intravenous Stopped 5/15/25 1837)   dexAMETHasone sodium phosphate injection 10 mg (10 mg Intravenous Given 5/15/25 1835)   cloNIDine (CATAPRES) tablet 0.2 mg (0.2 mg Oral Given 5/15/25 1824)         MEDICAL DECISION MAKING, PROGRESS, and CONSULTS    All labs, if obtained, have been independently reviewed by me.  All radiology studies, if obtained, have been reviewed by me and the radiologist dictating the report.  All EKG's, if obtained, have been independently viewed and interpreted by me      Discussion below represents my analysis of pertinent findings related to patient's condition, differential diagnosis, treatment plan and final disposition.      Differential diagnosis:    54-year-old female  presented ED with complaint of hypertension, her blood pressure was apparently 220 systolic at her PCP office today, the patient has a headache, she has been having headaches fairly regularly, she has no new or different neurologic symptoms, I think this is likely exacerbation of her underlying headaches.  Will obtain CT scan of the head, provide oral antihypertensives, obtain basic labs    Additional Sources:  External (non-ED) record review:  Discharge summary [facility] 4/4/2025 after admission for possible TIA       Orders placed during this visit:  Orders Placed This Encounter   Procedures    CT Head Without Contrast    Comprehensive Metabolic Panel    D-dimer, Quantitative    Magnesium    CBC Auto Differential    CBC & Differential         Additional orders considered but not ordered:  None    ED Course:    Consultants:  None    ED Course as of 05/15/25 2216   Thu May 15, 2025   1937 Patient's blood pressure is significantly improved, CT scan is negative, headache has improved, she is resting comfortably, neurologic status remained stable.  Lab workup is benign.  Will discharge patient home, will refill medications as she was unable to get refills today because she was redirected to the ER.  Patient and family voiced understanding agreeable with the plan for discharge home [CS]      ED Course User Index  [CS] Abdoul Gomez MD           After my consideration of clinical presentation and any laboratory/radiology studies obtained, I discussed the findings with the patient/patient representative who is in agreement with the treatment plan and the final disposition. Risks and benefits of discharge were discussed.     AS OF 22:16 EDT VITALS:    BP - 150/81  HR - 58  TEMP - 98.3 °F (36.8 °C) (Oral)  O2 SATS - 97%    I reviewed the patient's prescription monitoring report if available prior to discharge    DIAGNOSIS  Final diagnoses:   Acute nonintractable headache, unspecified headache type    Hypertension, unspecified type         DISPOSITION  ED Disposition       ED Disposition   Discharge    Condition   Stable    Comment   --                   Please note that portions of this document were completed with voice recognition software.        Abdoul Gomez MD  05/15/25 5247

## (undated) DEVICE — QUICK CATCH IN-LINE SUCTION POLYP TRAP IS USED FOR SUCTION RETRIEVAL OF ENDOSCOPICALLY REMOVED POLYPS.

## (undated) DEVICE — GLV SURG SENSICARE W/ALOE PF LF 6.5 STRL

## (undated) DEVICE — 2000CC GUARDIAN II: Brand: GUARDIAN

## (undated) DEVICE — GLV SURG SENSICARE W/ALOE PF LF SZ6 STRL

## (undated) DEVICE — JELLY,LUBE,STERILE,FLIP TOP,TUBE,2-OZ: Brand: MEDLINE

## (undated) DEVICE — SNAR POLYP SENSATION STDOVL 27 240 BX40

## (undated) DEVICE — FRCP BIOP COLD ENDOJAW ALLGTR W/NDL 2.8X2300MM BLU

## (undated) DEVICE — YANKAUER,BULB TIP, NO VENT: Brand: ARGYLE

## (undated) DEVICE — MEDI-VAC NON-CONDUCTIVE SUCTION TUBING: Brand: CARDINAL HEALTH

## (undated) DEVICE — KT ORCA VLV SXN AIR/H2O W/SEAL 1P/U STRL

## (undated) DEVICE — Device

## (undated) DEVICE — SYR LUER SLPTP 50ML

## (undated) DEVICE — LUBE JELLY PK/2.75GM STRL BX/144

## (undated) DEVICE — VLV SXN AIR/H2O ORCAPOD3 1P/U STRL

## (undated) DEVICE — CONMED SCOPE SAVER BITE BLOCK, 20X27 MM: Brand: SCOPE SAVER

## (undated) DEVICE — PAD GRND REM POLYHESIVE A/ DISP